# Patient Record
Sex: FEMALE | Race: WHITE | NOT HISPANIC OR LATINO | Employment: OTHER | URBAN - METROPOLITAN AREA
[De-identification: names, ages, dates, MRNs, and addresses within clinical notes are randomized per-mention and may not be internally consistent; named-entity substitution may affect disease eponyms.]

---

## 2017-04-07 ENCOUNTER — RECORDS - HEALTHEAST (OUTPATIENT)
Dept: LAB | Facility: CLINIC | Age: 67
End: 2017-04-07

## 2017-04-07 LAB
CHOLEST SERPL-MCNC: 172 MG/DL
FASTING STATUS PATIENT QL REPORTED: ABNORMAL
HDLC SERPL-MCNC: 41 MG/DL
LDLC SERPL CALC-MCNC: 99 MG/DL
TRIGL SERPL-MCNC: 160 MG/DL

## 2017-04-10 LAB — HCV AB SERPL QL IA: NEGATIVE

## 2017-12-08 ENCOUNTER — RECORDS - HEALTHEAST (OUTPATIENT)
Dept: LAB | Facility: CLINIC | Age: 67
End: 2017-12-08

## 2017-12-08 LAB
CHOLEST SERPL-MCNC: 116 MG/DL
FASTING STATUS PATIENT QL REPORTED: NO
HDLC SERPL-MCNC: 35 MG/DL
LDLC SERPL CALC-MCNC: 38 MG/DL
TRIGL SERPL-MCNC: 214 MG/DL

## 2018-02-22 ENCOUNTER — RECORDS - HEALTHEAST (OUTPATIENT)
Dept: LAB | Facility: CLINIC | Age: 68
End: 2018-02-22

## 2018-02-22 ENCOUNTER — RECORDS - HEALTHEAST (OUTPATIENT)
Dept: ADMINISTRATIVE | Facility: OTHER | Age: 68
End: 2018-02-22

## 2018-02-23 LAB — BACTERIA SPEC CULT: NO GROWTH

## 2018-02-26 ENCOUNTER — RECORDS - HEALTHEAST (OUTPATIENT)
Dept: ADMINISTRATIVE | Facility: OTHER | Age: 68
End: 2018-02-26

## 2018-03-01 ENCOUNTER — RECORDS - HEALTHEAST (OUTPATIENT)
Dept: ADMINISTRATIVE | Facility: OTHER | Age: 68
End: 2018-03-01

## 2018-03-01 ENCOUNTER — AMBULATORY - HEALTHEAST (OUTPATIENT)
Dept: SURGERY | Facility: CLINIC | Age: 68
End: 2018-03-01

## 2018-03-01 DIAGNOSIS — C50.911 MALIGNANT NEOPLASM OF RIGHT BREAST (H): ICD-10-CM

## 2018-03-20 ENCOUNTER — OFFICE VISIT - HEALTHEAST (OUTPATIENT)
Dept: SURGERY | Facility: CLINIC | Age: 68
End: 2018-03-20

## 2018-03-20 DIAGNOSIS — Z17.0 MALIGNANT NEOPLASM OF UPPER-OUTER QUADRANT OF RIGHT BREAST IN FEMALE, ESTROGEN RECEPTOR POSITIVE (H): ICD-10-CM

## 2018-03-20 DIAGNOSIS — C50.411 MALIGNANT NEOPLASM OF UPPER-OUTER QUADRANT OF RIGHT BREAST IN FEMALE, ESTROGEN RECEPTOR POSITIVE (H): ICD-10-CM

## 2018-03-20 RX ORDER — METOPROLOL TARTRATE 25 MG/1
25 TABLET, FILM COATED ORAL DAILY
Status: SHIPPED | COMMUNITY
Start: 2018-03-20

## 2018-03-20 RX ORDER — AMITRIPTYLINE HYDROCHLORIDE 10 MG/1
TABLET ORAL DAILY
Status: SHIPPED | COMMUNITY
Start: 2018-03-20 | End: 2022-08-15

## 2018-03-20 RX ORDER — DORZOLAMIDE HYDROCHLORIDE AND TIMOLOL MALEATE 20; 5 MG/ML; MG/ML
SOLUTION/ DROPS OPHTHALMIC
Status: SHIPPED | COMMUNITY
Start: 2018-03-20 | End: 2024-01-19

## 2018-03-20 RX ORDER — ATORVASTATIN CALCIUM 10 MG/1
10 TABLET, FILM COATED ORAL DAILY
Status: SHIPPED | COMMUNITY
Start: 2017-05-22

## 2018-03-20 RX ORDER — ASPIRIN 325 MG
325 TABLET ORAL DAILY
Status: SHIPPED | COMMUNITY
Start: 2018-03-20

## 2018-03-20 RX ORDER — TRIAMTERENE AND HYDROCHLOROTHIAZIDE 37.5; 25 MG/1; MG/1
CAPSULE ORAL DAILY
Status: SHIPPED | COMMUNITY
Start: 2016-10-14 | End: 2022-11-18 | Stop reason: ALTCHOICE

## 2018-03-20 RX ORDER — UBIDECARENONE 200 MG
200 CAPSULE ORAL DAILY
Status: SHIPPED | COMMUNITY
Start: 2018-03-20

## 2018-03-20 RX ORDER — LATANOPROST 50 UG/ML
1 SOLUTION/ DROPS OPHTHALMIC DAILY
Status: SHIPPED | COMMUNITY
Start: 2018-01-24 | End: 2023-08-29

## 2018-03-20 ASSESSMENT — MIFFLIN-ST. JEOR: SCORE: 1211.45

## 2018-04-11 ENCOUNTER — RECORDS - HEALTHEAST (OUTPATIENT)
Dept: LAB | Facility: CLINIC | Age: 68
End: 2018-04-11

## 2018-04-11 LAB
ANION GAP SERPL CALCULATED.3IONS-SCNC: 13 MMOL/L (ref 5–18)
BUN SERPL-MCNC: 12 MG/DL (ref 8–28)
CALCIUM SERPL-MCNC: 9.9 MG/DL (ref 8.5–10.5)
CHLORIDE BLD-SCNC: 93 MMOL/L (ref 98–107)
CO2 SERPL-SCNC: 31 MMOL/L (ref 22–31)
CREAT SERPL-MCNC: 0.91 MG/DL (ref 0.6–1.1)
GFR SERPL CREATININE-BSD FRML MDRD: >60 ML/MIN/1.73M2
GLUCOSE BLD-MCNC: 301 MG/DL (ref 70–125)
POTASSIUM BLD-SCNC: 3.1 MMOL/L (ref 3.5–5)
SODIUM SERPL-SCNC: 137 MMOL/L (ref 136–145)

## 2018-04-16 ENCOUNTER — RECORDS - HEALTHEAST (OUTPATIENT)
Dept: LAB | Facility: CLINIC | Age: 68
End: 2018-04-16

## 2018-04-16 LAB — POTASSIUM BLD-SCNC: 3.6 MMOL/L (ref 3.5–5)

## 2018-04-16 ASSESSMENT — MIFFLIN-ST. JEOR: SCORE: 1210.09

## 2018-04-17 ENCOUNTER — ANESTHESIA - HEALTHEAST (OUTPATIENT)
Dept: SURGERY | Facility: AMBULATORY SURGERY CENTER | Age: 68
End: 2018-04-17

## 2018-04-18 ENCOUNTER — SURGERY - HEALTHEAST (OUTPATIENT)
Dept: SURGERY | Facility: AMBULATORY SURGERY CENTER | Age: 68
End: 2018-04-18

## 2018-04-18 ENCOUNTER — HOSPITAL ENCOUNTER (OUTPATIENT)
Dept: MAMMOGRAPHY | Facility: CLINIC | Age: 68
Discharge: HOME | End: 2018-04-18
Attending: SPECIALIST | Admitting: RADIOLOGY

## 2018-04-18 ENCOUNTER — HOSPITAL ENCOUNTER (OUTPATIENT)
Dept: NUCLEAR MEDICINE | Facility: HOSPITAL | Age: 68
Discharge: HOME | End: 2018-04-18
Attending: SPECIALIST

## 2018-04-18 ENCOUNTER — HOSPITAL ENCOUNTER (OUTPATIENT)
Dept: MAMMOGRAPHY | Facility: CLINIC | Age: 68
Discharge: HOME | End: 2018-04-18
Attending: SPECIALIST

## 2018-04-18 DIAGNOSIS — Z17.0 MALIGNANT NEOPLASM OF UPPER-OUTER QUADRANT OF RIGHT BREAST IN FEMALE, ESTROGEN RECEPTOR POSITIVE (H): ICD-10-CM

## 2018-04-18 DIAGNOSIS — C50.411 MALIGNANT NEOPLASM OF UPPER-OUTER QUADRANT OF RIGHT BREAST IN FEMALE, ESTROGEN RECEPTOR POSITIVE (H): ICD-10-CM

## 2018-04-18 ASSESSMENT — MIFFLIN-ST. JEOR: SCORE: 1210.09

## 2018-04-24 ENCOUNTER — HOSPITAL ENCOUNTER (OUTPATIENT)
Dept: SURGERY | Facility: CLINIC | Age: 68
Discharge: HOME | End: 2018-04-24
Attending: SPECIALIST

## 2018-04-24 DIAGNOSIS — Z48.89 POSTOPERATIVE VISIT: ICD-10-CM

## 2018-04-24 DIAGNOSIS — C50.411 MALIGNANT NEOPLASM OF UPPER-OUTER QUADRANT OF RIGHT BREAST IN FEMALE, ESTROGEN RECEPTOR POSITIVE (H): ICD-10-CM

## 2018-04-24 DIAGNOSIS — Z17.0 MALIGNANT NEOPLASM OF UPPER-OUTER QUADRANT OF RIGHT BREAST IN FEMALE, ESTROGEN RECEPTOR POSITIVE (H): ICD-10-CM

## 2018-08-13 ENCOUNTER — RECORDS - HEALTHEAST (OUTPATIENT)
Dept: LAB | Facility: CLINIC | Age: 68
End: 2018-08-13

## 2018-08-13 LAB — MAGNESIUM SERPL-MCNC: 1.8 MG/DL (ref 1.8–2.6)

## 2018-11-13 ENCOUNTER — RECORDS - HEALTHEAST (OUTPATIENT)
Dept: LAB | Facility: CLINIC | Age: 68
End: 2018-11-13

## 2018-11-13 LAB
ALBUMIN SERPL-MCNC: 3.9 G/DL (ref 3.5–5)
ALP SERPL-CCNC: 78 U/L (ref 45–120)
ALT SERPL W P-5'-P-CCNC: 22 U/L (ref 0–45)
ANION GAP SERPL CALCULATED.3IONS-SCNC: 13 MMOL/L (ref 5–18)
AST SERPL W P-5'-P-CCNC: 23 U/L (ref 0–40)
BILIRUB SERPL-MCNC: 0.7 MG/DL (ref 0–1)
BUN SERPL-MCNC: 17 MG/DL (ref 8–28)
CALCIUM SERPL-MCNC: 10.8 MG/DL (ref 8.5–10.5)
CHLORIDE BLD-SCNC: 92 MMOL/L (ref 98–107)
CHOLEST SERPL-MCNC: 108 MG/DL
CO2 SERPL-SCNC: 34 MMOL/L (ref 22–31)
CREAT SERPL-MCNC: 0.89 MG/DL (ref 0.6–1.1)
FASTING STATUS PATIENT QL REPORTED: ABNORMAL
GFR SERPL CREATININE-BSD FRML MDRD: >60 ML/MIN/1.73M2
GLUCOSE BLD-MCNC: 152 MG/DL (ref 70–125)
HDLC SERPL-MCNC: 35 MG/DL
LDLC SERPL CALC-MCNC: 45 MG/DL
POTASSIUM BLD-SCNC: 3.6 MMOL/L (ref 3.5–5)
PROT SERPL-MCNC: 7.5 G/DL (ref 6–8)
SODIUM SERPL-SCNC: 139 MMOL/L (ref 136–145)
TRIGL SERPL-MCNC: 142 MG/DL

## 2019-03-05 ENCOUNTER — RECORDS - HEALTHEAST (OUTPATIENT)
Dept: LAB | Facility: CLINIC | Age: 69
End: 2019-03-05

## 2019-03-05 LAB
ALBUMIN SERPL-MCNC: 3.4 G/DL (ref 3.5–5)
ALP SERPL-CCNC: 75 U/L (ref 45–120)
ALT SERPL W P-5'-P-CCNC: 25 U/L (ref 0–45)
ANION GAP SERPL CALCULATED.3IONS-SCNC: 12 MMOL/L (ref 5–18)
AST SERPL W P-5'-P-CCNC: 22 U/L (ref 0–40)
BILIRUB SERPL-MCNC: 0.5 MG/DL (ref 0–1)
BUN SERPL-MCNC: 16 MG/DL (ref 8–28)
CALCIUM SERPL-MCNC: 10.2 MG/DL (ref 8.5–10.5)
CHLORIDE BLD-SCNC: 95 MMOL/L (ref 98–107)
CO2 SERPL-SCNC: 32 MMOL/L (ref 22–31)
CREAT SERPL-MCNC: 0.9 MG/DL (ref 0.6–1.1)
GFR SERPL CREATININE-BSD FRML MDRD: >60 ML/MIN/1.73M2
GLUCOSE BLD-MCNC: 186 MG/DL (ref 70–125)
POTASSIUM BLD-SCNC: 3.4 MMOL/L (ref 3.5–5)
PROT SERPL-MCNC: 6.9 G/DL (ref 6–8)
SODIUM SERPL-SCNC: 139 MMOL/L (ref 136–145)

## 2019-03-07 LAB — ANA SER QL: 0.6 U

## 2019-06-04 ENCOUNTER — RECORDS - HEALTHEAST (OUTPATIENT)
Dept: LAB | Facility: CLINIC | Age: 69
End: 2019-06-04

## 2019-06-04 LAB
ANION GAP SERPL CALCULATED.3IONS-SCNC: 11 MMOL/L (ref 5–18)
BUN SERPL-MCNC: 15 MG/DL (ref 8–28)
CALCIUM SERPL-MCNC: 10.6 MG/DL (ref 8.5–10.5)
CHLORIDE BLD-SCNC: 94 MMOL/L (ref 98–107)
CO2 SERPL-SCNC: 33 MMOL/L (ref 22–31)
CREAT SERPL-MCNC: 0.94 MG/DL (ref 0.6–1.1)
GFR SERPL CREATININE-BSD FRML MDRD: 59 ML/MIN/1.73M2
GLUCOSE BLD-MCNC: 147 MG/DL (ref 70–125)
POTASSIUM BLD-SCNC: 3.3 MMOL/L (ref 3.5–5)
SODIUM SERPL-SCNC: 138 MMOL/L (ref 136–145)

## 2019-09-10 ENCOUNTER — RECORDS - HEALTHEAST (OUTPATIENT)
Dept: LAB | Facility: CLINIC | Age: 69
End: 2019-09-10

## 2019-09-10 LAB
ANION GAP SERPL CALCULATED.3IONS-SCNC: 14 MMOL/L (ref 5–18)
BUN SERPL-MCNC: 15 MG/DL (ref 8–28)
CALCIUM SERPL-MCNC: 10.6 MG/DL (ref 8.5–10.5)
CHLORIDE BLD-SCNC: 94 MMOL/L (ref 98–107)
CO2 SERPL-SCNC: 30 MMOL/L (ref 22–31)
CREAT SERPL-MCNC: 0.93 MG/DL (ref 0.6–1.1)
CREAT UR-MCNC: 116 MG/DL
GFR SERPL CREATININE-BSD FRML MDRD: 59 ML/MIN/1.73M2
GLUCOSE BLD-MCNC: 143 MG/DL (ref 70–125)
MICROALBUMIN UR-MCNC: 1.3 MG/DL (ref 0–1.99)
MICROALBUMIN/CREAT UR: 11.2 MG/G
POTASSIUM BLD-SCNC: 3.6 MMOL/L (ref 3.5–5)
SODIUM SERPL-SCNC: 138 MMOL/L (ref 136–145)

## 2019-09-11 LAB — 25(OH)D3 SERPL-MCNC: 30.1 NG/ML (ref 30–80)

## 2020-01-07 ENCOUNTER — RECORDS - HEALTHEAST (OUTPATIENT)
Dept: LAB | Facility: CLINIC | Age: 70
End: 2020-01-07

## 2020-01-07 LAB
ANION GAP SERPL CALCULATED.3IONS-SCNC: 11 MMOL/L (ref 5–18)
BUN SERPL-MCNC: 15 MG/DL (ref 8–28)
CALCIUM SERPL-MCNC: 10.2 MG/DL (ref 8.5–10.5)
CHLORIDE BLD-SCNC: 90 MMOL/L (ref 98–107)
CHOLEST SERPL-MCNC: 110 MG/DL
CO2 SERPL-SCNC: 32 MMOL/L (ref 22–31)
CREAT SERPL-MCNC: 0.98 MG/DL (ref 0.6–1.1)
FASTING STATUS PATIENT QL REPORTED: ABNORMAL
GFR SERPL CREATININE-BSD FRML MDRD: 56 ML/MIN/1.73M2
GLUCOSE BLD-MCNC: 307 MG/DL (ref 70–125)
HDLC SERPL-MCNC: 41 MG/DL
LDLC SERPL CALC-MCNC: 43 MG/DL
POTASSIUM BLD-SCNC: 3.2 MMOL/L (ref 3.5–5)
SODIUM SERPL-SCNC: 133 MMOL/L (ref 136–145)
TRIGL SERPL-MCNC: 128 MG/DL

## 2020-02-20 ENCOUNTER — RECORDS - HEALTHEAST (OUTPATIENT)
Dept: LAB | Facility: CLINIC | Age: 70
End: 2020-02-20

## 2020-02-20 LAB — POTASSIUM BLD-SCNC: 3.8 MMOL/L (ref 3.5–5)

## 2020-05-18 ENCOUNTER — RECORDS - HEALTHEAST (OUTPATIENT)
Dept: LAB | Facility: CLINIC | Age: 70
End: 2020-05-18

## 2020-05-18 LAB
ANION GAP SERPL CALCULATED.3IONS-SCNC: 16 MMOL/L (ref 5–18)
BUN SERPL-MCNC: 13 MG/DL (ref 8–28)
CALCIUM SERPL-MCNC: 10.1 MG/DL (ref 8.5–10.5)
CHLORIDE BLD-SCNC: 92 MMOL/L (ref 98–107)
CO2 SERPL-SCNC: 29 MMOL/L (ref 22–31)
CREAT SERPL-MCNC: 1.1 MG/DL (ref 0.6–1.1)
GFR SERPL CREATININE-BSD FRML MDRD: 49 ML/MIN/1.73M2
GLUCOSE BLD-MCNC: 312 MG/DL (ref 70–125)
POTASSIUM BLD-SCNC: 3 MMOL/L (ref 3.5–5)
SODIUM SERPL-SCNC: 137 MMOL/L (ref 136–145)

## 2020-07-08 ENCOUNTER — RECORDS - HEALTHEAST (OUTPATIENT)
Dept: LAB | Facility: CLINIC | Age: 70
End: 2020-07-08

## 2020-07-08 LAB
ANION GAP SERPL CALCULATED.3IONS-SCNC: 13 MMOL/L (ref 5–18)
BUN SERPL-MCNC: 11 MG/DL (ref 8–28)
CALCIUM SERPL-MCNC: 10 MG/DL (ref 8.5–10.5)
CHLORIDE BLD-SCNC: 94 MMOL/L (ref 98–107)
CO2 SERPL-SCNC: 30 MMOL/L (ref 22–31)
CREAT SERPL-MCNC: 0.92 MG/DL (ref 0.6–1.1)
GFR SERPL CREATININE-BSD FRML MDRD: 60 ML/MIN/1.73M2
GLUCOSE BLD-MCNC: 141 MG/DL (ref 70–125)
POTASSIUM BLD-SCNC: 3.3 MMOL/L (ref 3.5–5)
SODIUM SERPL-SCNC: 137 MMOL/L (ref 136–145)

## 2020-09-21 ENCOUNTER — RECORDS - HEALTHEAST (OUTPATIENT)
Dept: LAB | Facility: CLINIC | Age: 70
End: 2020-09-21

## 2020-09-24 LAB
ANION GAP SERPL CALCULATED.3IONS-SCNC: 13 MMOL/L (ref 5–18)
BUN SERPL-MCNC: 15 MG/DL (ref 8–28)
CALCIUM SERPL-MCNC: 10 MG/DL (ref 8.5–10.5)
CHLORIDE BLD-SCNC: 96 MMOL/L (ref 98–107)
CO2 SERPL-SCNC: 27 MMOL/L (ref 22–31)
CREAT SERPL-MCNC: 0.99 MG/DL (ref 0.6–1.1)
GFR SERPL CREATININE-BSD FRML MDRD: 55 ML/MIN/1.73M2
GLUCOSE BLD-MCNC: 270 MG/DL (ref 70–125)
POTASSIUM BLD-SCNC: 3.8 MMOL/L (ref 3.5–5)
SODIUM SERPL-SCNC: 136 MMOL/L (ref 136–145)

## 2021-01-26 ENCOUNTER — RECORDS - HEALTHEAST (OUTPATIENT)
Dept: LAB | Facility: CLINIC | Age: 71
End: 2021-01-26

## 2021-01-26 LAB
ANION GAP SERPL CALCULATED.3IONS-SCNC: 12 MMOL/L (ref 5–18)
BUN SERPL-MCNC: 15 MG/DL (ref 8–28)
CALCIUM SERPL-MCNC: 10.3 MG/DL (ref 8.5–10.5)
CHLORIDE BLD-SCNC: 95 MMOL/L (ref 98–107)
CHOLEST SERPL-MCNC: 122 MG/DL
CO2 SERPL-SCNC: 30 MMOL/L (ref 22–31)
CREAT SERPL-MCNC: 1.12 MG/DL (ref 0.6–1.1)
FASTING STATUS PATIENT QL REPORTED: ABNORMAL
GFR SERPL CREATININE-BSD FRML MDRD: 48 ML/MIN/1.73M2
GLUCOSE BLD-MCNC: 257 MG/DL (ref 70–125)
HDLC SERPL-MCNC: 40 MG/DL
LDLC SERPL CALC-MCNC: 34 MG/DL
POTASSIUM BLD-SCNC: 4 MMOL/L (ref 3.5–5)
SODIUM SERPL-SCNC: 137 MMOL/L (ref 136–145)
TRIGL SERPL-MCNC: 238 MG/DL
TSH SERPL DL<=0.005 MIU/L-ACNC: 2.13 UIU/ML (ref 0.3–5)

## 2021-03-25 ENCOUNTER — AMBULATORY - HEALTHEAST (OUTPATIENT)
Dept: CARDIOLOGY | Facility: CLINIC | Age: 71
End: 2021-03-25

## 2021-03-25 ENCOUNTER — RECORDS - HEALTHEAST (OUTPATIENT)
Dept: ADMINISTRATIVE | Facility: OTHER | Age: 71
End: 2021-03-25

## 2021-04-01 ENCOUNTER — OFFICE VISIT - HEALTHEAST (OUTPATIENT)
Dept: CARDIOLOGY | Facility: CLINIC | Age: 71
End: 2021-04-01

## 2021-04-01 DIAGNOSIS — I51.7 LVH (LEFT VENTRICULAR HYPERTROPHY): ICD-10-CM

## 2021-04-01 RX ORDER — LETROZOLE 2.5 MG/1
1 TABLET, FILM COATED ORAL DAILY
Status: SHIPPED | COMMUNITY
Start: 2021-03-30 | End: 2023-02-01

## 2021-04-01 ASSESSMENT — MIFFLIN-ST. JEOR: SCORE: 1146.59

## 2021-05-11 ENCOUNTER — RECORDS - HEALTHEAST (OUTPATIENT)
Dept: LAB | Facility: CLINIC | Age: 71
End: 2021-05-11

## 2021-05-11 LAB
ANION GAP SERPL CALCULATED.3IONS-SCNC: 15 MMOL/L (ref 5–18)
BUN SERPL-MCNC: 13 MG/DL (ref 8–28)
CALCIUM SERPL-MCNC: 10.3 MG/DL (ref 8.5–10.5)
CHLORIDE BLD-SCNC: 93 MMOL/L (ref 98–107)
CO2 SERPL-SCNC: 31 MMOL/L (ref 22–31)
CREAT SERPL-MCNC: 0.97 MG/DL (ref 0.6–1.1)
CREAT UR-MCNC: 166.5 MG/DL
GFR SERPL CREATININE-BSD FRML MDRD: 56 ML/MIN/1.73M2
GLUCOSE BLD-MCNC: 182 MG/DL (ref 70–125)
MICROALBUMIN UR-MCNC: 4.01 MG/DL (ref 0–1.99)
MICROALBUMIN/CREAT UR: 24.1 MG/G
POTASSIUM BLD-SCNC: 3.8 MMOL/L (ref 3.5–5)
SODIUM SERPL-SCNC: 139 MMOL/L (ref 136–145)

## 2021-06-01 VITALS — BODY MASS INDEX: 29.11 KG/M2 | WEIGHT: 164.3 LBS | HEIGHT: 63 IN

## 2021-06-01 VITALS — BODY MASS INDEX: 29.06 KG/M2 | WEIGHT: 164 LBS | HEIGHT: 63 IN

## 2021-06-05 VITALS
BODY MASS INDEX: 26.58 KG/M2 | HEART RATE: 110 BPM | OXYGEN SATURATION: 93 % | SYSTOLIC BLOOD PRESSURE: 126 MMHG | HEIGHT: 63 IN | RESPIRATION RATE: 14 BRPM | DIASTOLIC BLOOD PRESSURE: 82 MMHG | WEIGHT: 150 LBS

## 2021-06-16 PROBLEM — C50.411 MALIGNANT NEOPLASM OF UPPER-OUTER QUADRANT OF RIGHT BREAST IN FEMALE, ESTROGEN RECEPTOR POSITIVE (H): Status: ACTIVE | Noted: 2018-04-05

## 2021-06-16 PROBLEM — Z17.0 MALIGNANT NEOPLASM OF UPPER-OUTER QUADRANT OF RIGHT BREAST IN FEMALE, ESTROGEN RECEPTOR POSITIVE (H): Status: ACTIVE | Noted: 2018-04-05

## 2021-06-16 NOTE — PROGRESS NOTES
This is a 68 y.o.  female who I'm asked to see by Meg Kahn MD for evaluation of a right breast cancer.  This was picked up  on screening mammogram.  The patient cannot feel a mass.  A needle biopsy was done which shows an invasive ductal carcinoma.  It is estrogen receptor positive, progesterone receptor positive and HER-2 negative.    She has a moderate family history of breast cancer. Her mother but no one else.      PAST MEDICAL HISTORY:  No past medical history on file.  No past surgical history on file.    Medications:    Current Outpatient Prescriptions:      amitriptyline (ELAVIL) 10 MG tablet, daily., Disp: , Rfl:      aspirin 325 MG tablet, daily., Disp: , Rfl:      atorvastatin (LIPITOR) 10 MG tablet, daily., Disp: , Rfl:      cholecalciferol, vitamin D3, 1,000 unit tablet, Take 2,000 Units by mouth daily., Disp: , Rfl:      coenzyme Q10 200 mg capsule, Take 200 mg by mouth daily., Disp: , Rfl:      dorzolamide-timolol (COSOPT) 22.3-6.8 mg/mL ophthalmic solution, 1 gtt, Disp: , Rfl:      fluticasone (FLONASE) 50 mcg/actuation nasal spray, as needed., Disp: , Rfl:      LACTOBACILLUS COMBO NO.6 (PROBIOTIC COMPLEX ORAL), 1, Disp: , Rfl:      latanoprost (XALATAN) 0.005 % ophthalmic solution, , Disp: , Rfl:      metFORMIN (GLUCOPHAGE) 500 MG tablet, 2 (two) times a day., Disp: , Rfl:      metoprolol tartrate (LOPRESSOR) 25 MG tablet, 2 (two) times a day., Disp: , Rfl:      omeprazole (PRILOSEC) 20 MG capsule, 2 tabs, Disp: , Rfl:      SIMETHICONE ORAL, Take by mouth as needed., Disp: , Rfl:      triamterene-hydrochlorothiazide (DYAZIDE) 37.5-25 mg per capsule, Take by mouth daily., Disp: , Rfl:     Allergies:  Allergies   Allergen Reactions     Sulfa (Sulfonamide Antibiotics) Unknown       Social History:   reports that she has never smoked. She has never used smokeless tobacco. She reports that she drinks alcohol. She reports that she does not use illicit drugs.    ROS:  A 12 point comprehensive review  "of systems was negative except as noted.    Physical Exam  /88 (Patient Site: Right Arm, Patient Position: Sitting, Cuff Size: Adult Regular)  Pulse 100  Ht 5' 2.5\" (1.588 m)  Wt 164 lb 4.8 oz (74.5 kg)  SpO2 96%  Breastfeeding? No  BMI 29.57 kg/m2  General:alert, appears stated age and cooperative  Lungs:clear to auscultation bilaterally  CV:regular rate and rhythm, S1, S2 normal, no murmur, click, rub or gallop  Breasts: No palpable masses.  No skin changes.  Lymph Nodes:no axillary nodes palpated  Neuro:Grossly normal      Reviewed her mammograms and ultrasound and pathology.     Impression: Right Breast Cancer. Clinically T1, N0.  Discussed the surgical options for treatment of breast cancer which generally are a lumpectomy (partial mastectomy) combined with radiation versus a mastectomy.  Explained that the survival benefit is the same for both.  The difference is in local recurrence risk.  The patient is a Excellent candidate for a lumpectomy.  This is a relatively small lesion and she has a very generous sized breast so she should build to have a lumpectomy with almost no change in her breasts.  Discussed SLN biopsy.  The procedure and rationale were explained.  Discussed that at this point we do not know yet whether or not she will need chemotherapy and we may not know until we get all of the results of surgery back.  Than what I see so far however I doubt that she will need therapy.  She will undoubtedly need hormone therapy.      Plan: We'll schedule for a right  lumpectomy with wire localization with sentinel lymph node biopsy.  This is typically an outpatient procedure under local MAC anesthetic.  The risks and benefits of surgery were explained.  Also talked about expected recovery time.          "

## 2021-06-17 NOTE — PROGRESS NOTES
In for follow-up of her right lumpectomy  with sentinel lymph node biopsy.  She is feeling well.  She is having very minimal pain.      Physical exam:  Appears well.  Does not appear in any discomfort.  Breasts: Incisions are healing nicely with no signs of infection.  No swelling.    Pathology: The tumor was 1.0 cm.  The margins are negative.  Her sentinel lymph node is negative.    Impression: Postop visit. Doing well. Has a very good prgnosis.  Because of her age, the fact the tumor is so small and low-grade, I do not think an Oncotype is needed.  I think she does need hormone therapy and I think she should at least have a discussion with the radiation oncologist about radiation.    Plan:  We'll refer her onto [place] [facility].  Follow-up with me in 1 year with bilateral mammograms.

## 2021-06-17 NOTE — ANESTHESIA CARE TRANSFER NOTE
Last vitals:   Vitals:    04/18/18 1240   BP: 128/60   Pulse: 82   Resp: 14   Temp: 37.5  C (99.5  F)   SpO2: 99%     Patient's level of consciousness is drowsy  Spontaneous respirations: yes  Maintains airway independently: yes  Dentition unchanged: yes  Oropharynx: oropharynx clear of all foreign objects    QCDR Measures:  ASA# 20 - Surgical Safety Checklist: WHO surgical safety checklist completed prior to induction  PQRS# 430 - Adult PONV Prevention: 4558F - Pt received => 2 anti-emetic agents (different classes) preop & intraop  ASA# 8 - Peds PONV Prevention: NA - Not pediatric patient, not GA or 2 or more risk factors NOT present  PQRS# 424 - Tammy-op Temp Management: 4559F - At least one body temp DOCUMENTED => 35.5C or 95.9F within required timeframe  PQRS# 426 - PACU Transfer Protocol: - Transfer of care checklist used  ASA# 14 - Acute Post-op Pain: ASA14B - Patient did NOT experience pain >= 7 out of 10

## 2021-06-17 NOTE — ANESTHESIA POSTPROCEDURE EVALUATION
Patient: Patty Soliman  Right Lumpectomy after Wire Localization; [place] Lymph Node Biopsy  Anesthesia type: MAC    Patient location: Phase II Recovery  Last vitals:   Vitals:    04/18/18 1300   BP: 123/58   Pulse: 84   Resp: 16   Temp:    SpO2: 94%     Post vital signs: stable  Level of consciousness: awake and responds to simple questions  Post-anesthesia pain: pain controlled  Post-anesthesia nausea and vomiting: no  Pulmonary: unassisted, return to baseline  Cardiovascular: stable and blood pressure at baseline  Hydration: adequate  Anesthetic events: no    QCDR Measures:  ASA# 11 - Tammy-op Cardiac Arrest: ASA11B - Patient did NOT experience unanticipated cardiac arrest  ASA# 12 - Tammy-op Mortality Rate: ASA12B - Patient did NOT die  ASA# 13 - PACU Re-Intubation Rate: NA - No ETT / LMA used for case  ASA# 10 - Composite Anes Safety: ASA10A - No serious adverse event    Additional Notes:

## 2021-06-17 NOTE — ANESTHESIA PREPROCEDURE EVALUATION
Anesthesia Evaluation      Patient summary reviewed     Airway   Mallampati: III  Neck ROM: full   Pulmonary - negative ROS    breath sounds clear to auscultation                         Cardiovascular   Exercise tolerance: > or = 4 METS  (+) hypertension, ,     Rhythm: regular        Neuro/Psych      Endo/Other    (+) diabetes mellitus type 2,      GI/Hepatic/Renal    (+) GERD well controlled,        Other findings:   NPO 9 PM      Breast CA      Dental - normal exam                        Anesthesia Plan  Planned anesthetic: MAC    ASA 3   Induction: intravenous   Anesthetic plan and risks discussed with: patient and spouse  Anesthesia plan special considerations: antiemetics,   Post-op plan: routine recovery        Aracely Alexander MD  Staff Anesthesiologist  [facility]  4/18/18

## 2021-06-18 NOTE — PATIENT INSTRUCTIONS - HE
Patient Instructions by Annabella Saeed MD at 4/1/2021  3:30 PM     Author: Annabella Saeed MD Service: -- Author Type: Physician    Filed: 4/1/2021  4:08 PM Encounter Date: 4/1/2021 Status: Signed    : Annabella Saeed MD (Physician)       Ms. Patty Soliman,     It was a pleasure to see you in the office today. My recommendations for you include:   1. Echo in one year  2. Follow up in one year     Please do not hesitate to call the [facility] with any questions or concerns at [phone].    Sincerely,

## 2021-06-30 NOTE — PROGRESS NOTES
Progress Notes by Annabella Saeed MD at 4/1/2021  3:30 PM     Author: Annabella Saeed MD Service: -- Author Type: Physician    Filed: 4/1/2021  4:29 PM Encounter Date: 4/1/2021 Status: Signed    : Annabella Saeed MD (Physician)           Thank you, Dr. Kahn, for asking us to see Patty Soliman at the [facility].      Assessment/Recommendations   Assessment:    1.  Mild asymmetric hypertrophy: No gradient at rest.  We discussed stress testing.  She had the same symptoms back in 2017 with nothing noted on her echo stress test at that time.  She would not be able to do an exercise stress echocardiogram due to inability to do the treadmill.  We would have to proceed with a dobutamine stress echo.  She would like to hold off on that at this point.  She does feel better after replacing her bed.  2.  Bronchiectasis/mild pulmonary fibrosis  3.  Diabetes mellitus type 2  4.  Dyslipidemia    Plan:  1.  Recommend considering following up with pulmonologist for further work-up given findings on her CT coronary angiogram showing bronchiectasis and possible mild pulmonary arterial dilatation.  She does feel better at this point would like to hold off on this.  2.  Repeat echocardiogram in 1 year.  3.  Follow-up in 1 year       History of Present Illness    Ms. Patty Soliman is a 71 y.o. female with history of poorly controlled diabetes mellitus, hypertension, dyslipidemia, chronic kidney disease who I am seeing today for initial consultation due to shortness of breath.  She recently had an echocardiogram done on 2/1/2021 that showed a left ventricular ejection fraction of 60 to 65%, focal mild asymmetric septal hypertrophy, mild diastolic dysfunction and aortic sclerosis.  She reports that she has had shortness of breath for about 4 years now.  She describes shortness of breath with exertion as well as congestion.  She feels like she has mucus in the back of her throat.  She  feels that she may be allergic to something and has had been tried on medications in the past.  She saw Dr. Das for the same thing back in 2017.  Since stress echocardiogram was equivocal.  She had trouble exercising on a treadmill.  She underwent a CT coronary angiogram that showed a calcium score of 88 and mild nonobstructive disease with mild PA dilatation and bronchiectasis indicating mild pulmonary fibrosis.  She has never been seen by pulmonologist.  She denies any chest pain.  She reports that she noticed that her bed was discolored and black in certain places and so she recently got a new bed a few days ago and has noticed improvement in her breathing since that time.    CT coronary angiogram with calcium score 17  Result Impression   1. Total Agatston calcium score 88; 73rd percentile compared to age and   gender match control group.  2. Right dominant coronary arterial system.  3. Mild atherosclerosis distal left main coronary artery and   ostial/proximal left anterior descending coronary artery (calcified and   noncalcified).  4. Small/deminutive mid to distal left anterior descending coronary   artery; limited opacification/visualization (see below).  5. Large, dominant,right coronary artery with large posterior descending   artery; distal vessel extends to the anterior apical segment left   ventricle.  6. Anterolateral mitral annular calcification with moderate, diffuse,   thickening valve leaflets (see below).  7. Small, tunneled apical ventricular septal defect.  8. Dilated main and branch main pulmonary arterial system.         Result Narrative   Exercise Stress ECHO Report    Name: Patty Soliman  : 1950       Age: 67 y.o.  MRN: [id number]      Ordering: Meg Kahn MD    PCP: Meg Kahn MD   Supervising MD: Darren Bustamante Reporting MD: Darren Bustamante    Reason for Test:atypical chest pain, BOYER    Stress Protocol:Joseph protocol      Observation:    Resting heart rate was 99 beats/min with  a resting blood pressure of   128/70 mm Hg.  The patient exercised for 6 minutes and 0 seconds, reaching   a maximum heart rate of 156  beats/minute. Peak systolic blood pressure   was 142/86 mm Hg. Effort was equivalent to 7.3 METS, with a RPP of 75281.   Patient achieved 100 percent of age predicted maximum heart rate.    Exercise was terminated due to profound shortness of breath and fatigue   without reproduction of chest pain.    Pre exercise echocardiographic imaging shows EF of 55% and post exercise   echocardiographic imaging show global dysfunction without a clear area of   ischemia.    Resting EKG: normal sinus rhythm with nonspecific ICVD.    Stress EKG demonstrated no ischemic or ST segment changes over baseline.     Screening doppler is negative for significant valvular disease.    Conclusion:  1. Reduced effort tolerance likely with significant deconditioning  2. Adequate stress for interpretation with patient achieving 100 percent   of predicted maximum HR.  3. No subjective chest pain, but significant shortness of breath without   hemodynamic or ECG evidence of inducible ischemia.  4. Echocardiographic evidence of global hypokinesis as peak exercise   without obvious regional ischemia with some concerns for multivessel   disease.            Physical Examination Review of Systems   Vitals:    04/01/21 1536   BP: 126/82   Pulse: (!) 110   Resp: 14   SpO2: 93%     Body mass index is 27 kg/m .  Wt Readings from Last 3 Encounters:   04/01/21 150 lb (68 kg)   04/18/18 164 lb (74.4 kg)   03/20/18 164 lb 4.8 oz (74.5 kg)       General Appearance:   alert, no apparent distress   HEENT:  no scleral icterus; the mucous membranes are pink and moist                                  Neck: No jvd   Chest: the spine is straight and the chest is symmetric   Lungs:   respirations unlabored; the lungs are clear to auscultation   Cardiovascular:   regular rhythm with normal first and second heart sounds and no murmurs  or gallops   Abdomen:  no organomegaly, masses, bruits, or tenderness; bowel sounds are present   Extremities: no cyanosis, clubbing, or edema   Skin: no xanthelasma    General: WNL  Eyes: WNL  Ears/Nose/Throat: WNL  Lungs: Cough, Shortness of Breath  Heart: Shortness of Breath with activity  Stomach: Heartburn  Bladder: WNL  Muscle/Joints: Muscle Pain  Skin: WNL  Nervous System: WNL  Mental Health: Anxiety     Blood: WNL     Medical History  Surgical History Family History Social History   Past Medical History:   Diagnosis Date   ? Cancer (H)    ? Diabetes mellitus (H)     Type 2 Diabetic   ? Hypertension     Past Surgical History:   Procedure Laterality Date   ? [place] MASTECTOMY,PARTIAL,  WITH AXILLARY LYMPHADENECTOMY Right 4/18/2018    Procedure: Right Lumpectomy after Wire Localization; [place] Lymph Node Biopsy;  Surgeon: Katie Sahu MD;  [facility]: [facility];  Service: General   ? TUBAL LIGATION      No family history of premature coronary artery disease Social History     Socioeconomic History   ? Marital status:      Spouse name: Not on file   ? Number of children: Not on file   ? Years of education: Not on file   ? Highest education level: Not on file   Occupational History   ? Not on file   Social Needs   ? Financial resource strain: Not on file   ? Food insecurity     Worry: Not on file     Inability: Not on file   ? Transportation needs     Medical: Not on file     Non-medical: Not on file   Tobacco Use   ? Smoking status: Never Smoker   ? Smokeless tobacco: Never Used   Substance and Sexual Activity   ? Alcohol use: Yes     Comment: rarely   ? Drug use: No   ? Sexual activity: Not on file   Lifestyle   ? Physical activity     Days per week: Not on file     Minutes per session: Not on file   ? Stress: Not on file   Relationships   ? Social connections     Talks on phone: Not on file     Gets together: Not on file     Attends Muslim service: Not on file     Active member of club or  organization: Not on file     Attends meetings of clubs or organizations: Not on file     Relationship status: Not on file   ? Intimate partner violence     Fear of current or ex partner: Not on file     Emotionally abused: Not on file     Physically abused: Not on file     Forced sexual activity: Not on file   Other Topics Concern   ? Not on file   Social History Narrative   ? Not on file          Medications  Allergies   Current Outpatient Medications   Medication Sig Dispense Refill   ? amitriptyline (ELAVIL) 10 MG tablet daily.     ? aspirin 325 MG tablet daily.     ? atorvastatin (LIPITOR) 10 MG tablet daily.     ? cholecalciferol, vitamin D3, 1,000 unit tablet Take 2,000 Units by mouth daily.     ? coenzyme Q10 200 mg capsule Take 200 mg by mouth daily.     ? dorzolamide-timolol (COSOPT) 22.3-6.8 mg/mL ophthalmic solution 1 gtt     ? fluticasone (FLONASE) 50 mcg/actuation nasal spray as needed.     ? HYDROcodone-acetaminophen 5-325 mg per tablet Take 1 tablet by mouth every 4 (four) hours as needed for pain. 25 tablet 0   ? LACTOBACILLUS COMBO NO.6 (PROBIOTIC COMPLEX ORAL) 1     ? latanoprost (XALATAN) 0.005 % ophthalmic solution Administer 1 drop to both eyes daily.      ? letrozole (FEMARA) 2.5 mg tablet Take 1 tablet by mouth daily.     ? metFORMIN (GLUCOPHAGE) 500 MG tablet Take 1,000 mg by mouth daily with breakfast.      ? metoprolol tartrate (LOPRESSOR) 25 MG tablet Take 25 mg by mouth daily.      ? omeprazole (PRILOSEC) 20 MG capsule Take 20 mg by mouth daily before breakfast.      ? potassium chloride SA (K-DUR,KLOR-CON) 20 MEQ tablet Take 20 mEq by mouth as needed.      ? SIMETHICONE ORAL Take by mouth as needed.     ? triamterene-hydrochlorothiazide (DYAZIDE) 37.5-25 mg per capsule Take by mouth daily.       No current facility-administered medications for this visit.       Allergies   Allergen Reactions   ? Escitalopram Oxalate      Other reaction(s): fatigue   ? Sulfa (Sulfonamide Antibiotics)  Unknown   ? Sulfamethoxazole-Trimethoprim      Other reaction(s): Unknown         Lab Results    Chemistry/lipid CBC Cardiac Enzymes/BNP/TSH/INR   Lab Results   Component Value Date    CHOL 122 01/26/2021    HDL 40 (L) 01/26/2021    LDLCALC 34 01/26/2021    TRIG 238 (H) 01/26/2021    CREATININE 1.12 (H) 01/26/2021    BUN 15 01/26/2021    K 4.0 01/26/2021     01/26/2021    CL 95 (L) 01/26/2021    CO2 30 01/26/2021    No results found for: WBC, HGB, HCT, MCV, PLT Lab Results   Component Value Date    TSH 2.13 01/26/2021

## 2021-08-21 ENCOUNTER — HEALTH MAINTENANCE LETTER (OUTPATIENT)
Age: 71
End: 2021-08-21

## 2021-09-07 ENCOUNTER — LAB REQUISITION (OUTPATIENT)
Dept: LAB | Facility: CLINIC | Age: 71
End: 2021-09-07
Payer: MEDICARE

## 2021-09-07 DIAGNOSIS — E87.6 HYPOKALEMIA: ICD-10-CM

## 2021-09-07 LAB
ANION GAP SERPL CALCULATED.3IONS-SCNC: 13 MMOL/L (ref 5–18)
BUN SERPL-MCNC: 13 MG/DL (ref 8–28)
CALCIUM SERPL-MCNC: 10.4 MG/DL (ref 8.5–10.5)
CHLORIDE BLD-SCNC: 96 MMOL/L (ref 98–107)
CO2 SERPL-SCNC: 29 MMOL/L (ref 22–31)
CREAT SERPL-MCNC: 0.93 MG/DL (ref 0.6–1.1)
GFR SERPL CREATININE-BSD FRML MDRD: 61 ML/MIN/1.73M2
GLUCOSE BLD-MCNC: 170 MG/DL (ref 70–125)
POTASSIUM BLD-SCNC: 3.5 MMOL/L (ref 3.5–5)
SODIUM SERPL-SCNC: 138 MMOL/L (ref 136–145)

## 2021-09-07 PROCEDURE — 80048 BASIC METABOLIC PNL TOTAL CA: CPT | Mod: ORL | Performed by: FAMILY MEDICINE

## 2021-10-16 ENCOUNTER — HEALTH MAINTENANCE LETTER (OUTPATIENT)
Age: 71
End: 2021-10-16

## 2022-03-29 ENCOUNTER — LAB REQUISITION (OUTPATIENT)
Dept: LAB | Facility: CLINIC | Age: 72
End: 2022-03-29
Payer: MEDICARE

## 2022-03-29 DIAGNOSIS — E78.2 MIXED HYPERLIPIDEMIA: ICD-10-CM

## 2022-03-29 LAB
CHOLEST SERPL-MCNC: 116 MG/DL
FASTING STATUS PATIENT QL REPORTED: ABNORMAL
HDLC SERPL-MCNC: 36 MG/DL
LDLC SERPL CALC-MCNC: 43 MG/DL
TRIGL SERPL-MCNC: 185 MG/DL

## 2022-03-29 PROCEDURE — 80061 LIPID PANEL: CPT | Mod: ORL | Performed by: FAMILY MEDICINE

## 2022-06-30 ENCOUNTER — OFFICE VISIT (OUTPATIENT)
Dept: CARDIOLOGY | Facility: CLINIC | Age: 72
End: 2022-06-30
Payer: MEDICARE

## 2022-06-30 VITALS
HEART RATE: 102 BPM | DIASTOLIC BLOOD PRESSURE: 74 MMHG | HEIGHT: 62 IN | BODY MASS INDEX: 23.92 KG/M2 | SYSTOLIC BLOOD PRESSURE: 120 MMHG | RESPIRATION RATE: 16 BRPM | WEIGHT: 130 LBS

## 2022-06-30 DIAGNOSIS — I27.20 PULMONARY HYPERTENSION (H): Primary | ICD-10-CM

## 2022-06-30 PROCEDURE — 99215 OFFICE O/P EST HI 40 MIN: CPT | Performed by: INTERNAL MEDICINE

## 2022-06-30 NOTE — LETTER
6/30/2022    Meg Kahn MD  [facility] [address]  [facility] [address]    RE: Patty Soliman       Dear Colleague,     I had the pleasure of seeing Patty Soliman in the [facility].    HEART CARE ENCOUNTER CONSULTATON NOTE      [facility]  [phone]      Assessment/Recommendations   Assessment:    1.  Pulmonary hypertension: May be mainly who group 3.  She has known pulmonary fibrosis.  Will recommend further work-up.  I referred her to the pulmonary hypertension clinic to see .  I think she would benefit from right heart catheterization for further evaluation and to help with management.  Unclear if she has left-sided disease and may benefit from further diuretics.  She had evidence of pulmonary arterial dilation on a CT coronary angiogram back in 2017.  2.    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 Lunsford  3.  Diabetes mellitus type 2  4.  Dyslipidemia  5.  Nonobstructive coronary disease on CT coronary angiogram 2017  6.  Chronic hypoxic respiratory failure and O2 supplementation  7.  Kyphoscoliosis     Plan:  1.    Recommend right heart catheterization and will discuss further with   2.  Consider adding loop diuretics  3.    Continue O2 supplementation     History of Present Illness/Subjective    HPI: Patty Soliman is a 72 year old female with history of pulmonary fibrosis, diabetes mellitus type 2, nonobstructive coronary artery disease on angiogram in 2017, heart failure with preserved ejection fraction who I am seeing today for follow-up.  I last saw her a year ago.  Over the past few months she has decompensated.  She has had problems with progressive shortness of breath and is now on O2 supplementation.  She is on 2 L of oxygen chronically with 3 to 5 L with exertion.  She denies any chest pain.  She denies any lower extremity edema orthopnea.  She was seen by her pulmonologist Dr. Lunsford  "who has recommended that she consider starting 2 different medications for pulmonary fibrosis and recommended complete function tests in 2 to 3 months.  Repeat CT chest showed mild progression of fibrosis.  Repeat echocardiogram this year showed significant pulmonary hypertension with an RVSP of 60 and now development of RV dilatation with RV dysfunction.  Preserved left ventricular systolic function.         Physical Examination  Review of Systems   Vitals: /74 (BP Location: Left arm, Patient Position: Sitting, Cuff Size: Adult Regular)   Pulse 102   Resp 16   Ht 1.575 m (5' 2\")   Wt 59 kg (130 lb)   BMI 23.78 kg/m    BMI= Body mass index is 23.78 kg/m .  Wt Readings from Last 3 Encounters:   06/30/22 59 kg (130 lb)   04/01/21 68 kg (150 lb)   04/18/18 74.4 kg (164 lb)       General Appearance:   no distress, normal body habitus   ENT/Mouth: membranes moist, no oral lesions or bleeding gums.      EYES:  no scleral icterus, normal conjunctivae   Neck: no carotid bruits or thyromegaly   Chest/Lungs:   lungs are clear to auscultation   Cardiovascular:   Regular. Normal first and second heart sounds with no murmurs  no edema bilaterally    Abdomen:  no organomegaly, masses, bruits, or tenderness; bowel sounds are present   Extremities: no cyanosis or clubbing   Skin: no xanthelasma, warm.    Neurologic: normal  bilateral, no tremors     Psychiatric: alert and oriented x3, calm        Please refer above for cardiac ROS details.        Medical History  Surgical History Family History Social History   Past Medical History:   Diagnosis Date     Cancer (H)      Diabetes mellitus (H)     Type 2 Diabetic     Hypertension      Past Surgical History:   Procedure Laterality Date     DC MASTECTOMY,PARTIAL,  WITH AXILLARY LYMPHADENECTOMY Right 4/18/2018    Procedure: Right Lumpectomy after Wire Localization; [place] Lymph Node Biopsy;  Surgeon: Katie Sahu MD;  Location: [facility];  Service: General     " TUBAL LIGATION       No family history of heart disease   Social History     Socioeconomic History     Marital status:      Spouse name: Not on file     Number of children: Not on file     Years of education: Not on file     Highest education level: Not on file   Occupational History     Not on file   Tobacco Use     Smoking status: Never Smoker     Smokeless tobacco: Never Used   Substance and Sexual Activity     Alcohol use: Yes     Comment: Alcoholic Drinks/day: rarely     Drug use: No     Sexual activity: Not on file   Other Topics Concern     Not on file   Social History Narrative     Not on file     Social Determinants of Health     Financial Resource Strain: Not on file   Food Insecurity: Not on file   Transportation Needs: Not on file   Physical Activity: Not on file   Stress: Not on file   Social Connections: Not on file   Intimate Partner Violence: Not on file   Housing Stability: Not on file           Medications  Allergies   Current Outpatient Medications   Medication Sig Dispense Refill     aspirin 325 MG tablet [ASPIRIN 325 MG TABLET] daily.       atorvastatin (LIPITOR) 10 MG tablet [ATORVASTATIN (LIPITOR) 10 MG TABLET] daily.       cholecalciferol, vitamin D3, 1,000 unit tablet [CHOLECALCIFEROL, VITAMIN D3, 1,000 UNIT TABLET] Take 2,000 Units by mouth daily.       coenzyme Q10 200 mg capsule [COENZYME Q10 200 MG CAPSULE] Take 200 mg by mouth daily.       dorzolamide-timolol (COSOPT) 22.3-6.8 mg/mL ophthalmic solution [DORZOLAMIDE-TIMOLOL (COSOPT) 22.3-6.8 MG/ML OPHTHALMIC SOLUTION] 1 gtt       fluticasone (FLONASE) 50 mcg/actuation nasal spray [FLUTICASONE (FLONASE) 50 MCG/ACTUATION NASAL SPRAY] as needed.       HYDROcodone-acetaminophen 5-325 mg per tablet [HYDROCODONE-ACETAMINOPHEN 5-325 MG PER TABLET] Take 1 tablet by mouth every 4 (four) hours as needed for pain. 25 tablet 0     LACTOBACILLUS COMBO NO.6 (PROBIOTIC COMPLEX ORAL) [LACTOBACILLUS COMBO NO.6 (PROBIOTIC COMPLEX ORAL)] 1        latanoprost (XALATAN) 0.005 % ophthalmic solution [LATANOPROST (XALATAN) 0.005 % OPHTHALMIC SOLUTION] Administer 1 drop to both eyes daily.        letrozole (FEMARA) 2.5 mg tablet [LETROZOLE (FEMARA) 2.5 MG TABLET] Take 1 tablet by mouth daily.       metFORMIN (GLUCOPHAGE) 500 MG tablet [METFORMIN (GLUCOPHAGE) 500 MG TABLET] Take 1,000 mg by mouth daily with breakfast.        metoprolol tartrate (LOPRESSOR) 25 MG tablet [METOPROLOL TARTRATE (LOPRESSOR) 25 MG TABLET] Take 25 mg by mouth daily.        omeprazole (PRILOSEC) 20 MG capsule [OMEPRAZOLE (PRILOSEC) 20 MG CAPSULE] Take 20 mg by mouth daily before breakfast.        potassium chloride SA (K-DUR,KLOR-CON) 20 MEQ tablet [POTASSIUM CHLORIDE SA (K-DUR,KLOR-CON) 20 MEQ TABLET] Take 20 mEq by mouth as needed.        triamterene-hydrochlorothiazide (DYAZIDE) 37.5-25 mg per capsule [TRIAMTERENE-HYDROCHLOROTHIAZIDE (DYAZIDE) 37.5-25 MG PER CAPSULE] Take by mouth daily.       amitriptyline (ELAVIL) 10 MG tablet [AMITRIPTYLINE (ELAVIL) 10 MG TABLET] daily. (Patient not taking: Reported on 6/30/2022)       SIMETHICONE ORAL [SIMETHICONE ORAL] Take by mouth as needed. (Patient not taking: Reported on 6/30/2022)         Allergies   Allergen Reactions     Escitalopram Oxalate [Escitalopram] Unknown     Other reaction(s): fatigue     Sulfa (Sulfonamide Antibiotics) [Sulfa Drugs] Unknown     Sulfamethoxazole-Trimethoprim [Sulfamethoxazole W/Trimethoprim] Unknown     Other reaction(s): Unknown          Lab Results    Chemistry/lipid CBC Cardiac Enzymes/BNP/TSH/INR   Recent Labs   Lab Test 03/29/22  1027   CHOL 116   HDL 36*   LDL 43   TRIG 185*     Recent Labs   Lab Test 03/29/22  1027 01/26/21  1155 01/07/20  1148   LDL 43 34 43     Recent Labs   Lab Test 09/07/21  1146      POTASSIUM 3.5   CHLORIDE 96*   CO2 29   *   BUN 13   CR 0.93   GFRESTIMATED 61   GREGORY 10.4     Recent Labs   Lab Test 09/07/21  1146 05/11/21  0927 01/26/21  1155   CR 0.93 0.97 1.12*     No  results for input(s): A1C in the last 17888 hours.       No results for input(s): WBC, HGB, HCT, MCV, PLT in the last 91990 hours.  No results for input(s): HGB in the last 70813 hours. No results for input(s): TROPONINI in the last 74438 hours.  No results for input(s): BNP, NTBNPI, NTBNP in the last 54552 hours.  Recent Labs   Lab Test 01/26/21  1155   TSH 2.13     No results for input(s): INR in the last 38744 hours.     Annabella Saeed MD    Thank you for allowing me to participate in the care of your patient.      Sincerely,     Annabella Saeed MD   [facility]  cc: Referred Self,

## 2022-06-30 NOTE — PROGRESS NOTES
HEART CARE ENCOUNTER CONSULTATON NOTE      [facility]  [phone]      Assessment/Recommendations   Assessment:    1.  Pulmonary hypertension: May be mainly who group 3.  She has known pulmonary fibrosis.  Will recommend further work-up.  I referred her to the pulmonary hypertension clinic to see .  I think she would benefit from right heart catheterization for further evaluation and to help with management.  Unclear if she has left-sided disease and may benefit from further diuretics.  She had evidence of pulmonary arterial dilation on a CT coronary angiogram back in 2017.  2.    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 Lunsford  3.  Diabetes mellitus type 2  4.  Dyslipidemia  5.  Nonobstructive coronary disease on CT coronary angiogram 2017  6.  Chronic hypoxic respiratory failure and O2 supplementation  7.  Kyphoscoliosis     Plan:  1.    Recommend right heart catheterization and will discuss further with   2.  Consider adding loop diuretics  3.    Continue O2 supplementation     History of Present Illness/Subjective    HPI: Patty Soliman is a 72 year old female with history of pulmonary fibrosis, diabetes mellitus type 2, nonobstructive coronary artery disease on angiogram in 2017, heart failure with preserved ejection fraction who I am seeing today for follow-up.  I last saw her a year ago.  Over the past few months she has decompensated.  She has had problems with progressive shortness of breath and is now on O2 supplementation.  She is on 2 L of oxygen chronically with 3 to 5 L with exertion.  She denies any chest pain.  She denies any lower extremity edema orthopnea.  She was seen by her pulmonologist Dr. Lunsford who has recommended that she consider starting 2 different medications for pulmonary fibrosis and recommended complete function tests in 2 to 3 months.  Repeat CT chest showed mild progression of fibrosis.  Repeat echocardiogram  "this year showed significant pulmonary hypertension with an RVSP of 60 and now development of RV dilatation with RV dysfunction.  Preserved left ventricular systolic function.         Physical Examination  Review of Systems   Vitals: /74 (BP Location: Left arm, Patient Position: Sitting, Cuff Size: Adult Regular)   Pulse 102   Resp 16   Ht 1.575 m (5' 2\")   Wt 59 kg (130 lb)   BMI 23.78 kg/m    BMI= Body mass index is 23.78 kg/m .  Wt Readings from Last 3 Encounters:   06/30/22 59 kg (130 lb)   04/01/21 68 kg (150 lb)   04/18/18 74.4 kg (164 lb)       General Appearance:   no distress, normal body habitus   ENT/Mouth: membranes moist, no oral lesions or bleeding gums.      EYES:  no scleral icterus, normal conjunctivae   Neck: no carotid bruits or thyromegaly   Chest/Lungs:   lungs are clear to auscultation   Cardiovascular:   Regular. Normal first and second heart sounds with no murmurs  no edema bilaterally    Abdomen:  no organomegaly, masses, bruits, or tenderness; bowel sounds are present   Extremities: no cyanosis or clubbing   Skin: no xanthelasma, warm.    Neurologic: normal  bilateral, no tremors     Psychiatric: alert and oriented x3, calm        Please refer above for cardiac ROS details.        Medical History  Surgical History Family History Social History   Past Medical History:   Diagnosis Date     Cancer (H)      Diabetes mellitus (H)     Type 2 Diabetic     Hypertension      Past Surgical History:   Procedure Laterality Date     [place] MASTECTOMY,PARTIAL,  WITH AXILLARY LYMPHADENECTOMY Right 4/18/2018    Procedure: Right Lumpectomy after Wire Localization; [place] Lymph Node Biopsy;  Surgeon: Katie Sahu MD;  Location: [facility];  Service: General     TUBAL LIGATION       No family history of heart disease   Social History     Socioeconomic History     Marital status:      Spouse name: Not on file     Number of children: Not on file     Years of education: Not on file "     Highest education level: Not on file   Occupational History     Not on file   Tobacco Use     Smoking status: Never Smoker     Smokeless tobacco: Never Used   Substance and Sexual Activity     Alcohol use: Yes     Comment: Alcoholic Drinks/day: rarely     Drug use: No     Sexual activity: Not on file   Other Topics Concern     Not on file   Social History Narrative     Not on file     Social Determinants of Health     Financial Resource Strain: Not on file   Food Insecurity: Not on file   Transportation Needs: Not on file   Physical Activity: Not on file   Stress: Not on file   Social Connections: Not on file   Intimate Partner Violence: Not on file   Housing Stability: Not on file           Medications  Allergies   Current Outpatient Medications   Medication Sig Dispense Refill     aspirin 325 MG tablet [ASPIRIN 325 MG TABLET] daily.       atorvastatin (LIPITOR) 10 MG tablet [ATORVASTATIN (LIPITOR) 10 MG TABLET] daily.       cholecalciferol, vitamin D3, 1,000 unit tablet [CHOLECALCIFEROL, VITAMIN D3, 1,000 UNIT TABLET] Take 2,000 Units by mouth daily.       coenzyme Q10 200 mg capsule [COENZYME Q10 200 MG CAPSULE] Take 200 mg by mouth daily.       dorzolamide-timolol (COSOPT) 22.3-6.8 mg/mL ophthalmic solution [DORZOLAMIDE-TIMOLOL (COSOPT) 22.3-6.8 MG/ML OPHTHALMIC SOLUTION] 1 gtt       fluticasone (FLONASE) 50 mcg/actuation nasal spray [FLUTICASONE (FLONASE) 50 MCG/ACTUATION NASAL SPRAY] as needed.       HYDROcodone-acetaminophen 5-325 mg per tablet [HYDROCODONE-ACETAMINOPHEN 5-325 MG PER TABLET] Take 1 tablet by mouth every 4 (four) hours as needed for pain. 25 tablet 0     LACTOBACILLUS COMBO NO.6 (PROBIOTIC COMPLEX ORAL) [LACTOBACILLUS COMBO NO.6 (PROBIOTIC COMPLEX ORAL)] 1       latanoprost (XALATAN) 0.005 % ophthalmic solution [LATANOPROST (XALATAN) 0.005 % OPHTHALMIC SOLUTION] Administer 1 drop to both eyes daily.        letrozole (FEMARA) 2.5 mg tablet [LETROZOLE (FEMARA) 2.5 MG TABLET] Take 1 tablet by  mouth daily.       metFORMIN (GLUCOPHAGE) 500 MG tablet [METFORMIN (GLUCOPHAGE) 500 MG TABLET] Take 1,000 mg by mouth daily with breakfast.        metoprolol tartrate (LOPRESSOR) 25 MG tablet [METOPROLOL TARTRATE (LOPRESSOR) 25 MG TABLET] Take 25 mg by mouth daily.        omeprazole (PRILOSEC) 20 MG capsule [OMEPRAZOLE (PRILOSEC) 20 MG CAPSULE] Take 20 mg by mouth daily before breakfast.        potassium chloride SA (K-DUR,KLOR-CON) 20 MEQ tablet [POTASSIUM CHLORIDE SA (K-DUR,KLOR-CON) 20 MEQ TABLET] Take 20 mEq by mouth as needed.        triamterene-hydrochlorothiazide (DYAZIDE) 37.5-25 mg per capsule [TRIAMTERENE-HYDROCHLOROTHIAZIDE (DYAZIDE) 37.5-25 MG PER CAPSULE] Take by mouth daily.       amitriptyline (ELAVIL) 10 MG tablet [AMITRIPTYLINE (ELAVIL) 10 MG TABLET] daily. (Patient not taking: Reported on 6/30/2022)       SIMETHICONE ORAL [SIMETHICONE ORAL] Take by mouth as needed. (Patient not taking: Reported on 6/30/2022)         Allergies   Allergen Reactions     Escitalopram Oxalate [Escitalopram] Unknown     Other reaction(s): fatigue     Sulfa (Sulfonamide Antibiotics) [Sulfa Drugs] Unknown     Sulfamethoxazole-Trimethoprim [Sulfamethoxazole W/Trimethoprim] Unknown     Other reaction(s): Unknown          Lab Results    Chemistry/lipid CBC Cardiac Enzymes/BNP/TSH/INR   Recent Labs   Lab Test 03/29/22  1027   CHOL 116   HDL 36*   LDL 43   TRIG 185*     Recent Labs   Lab Test 03/29/22  1027 01/26/21  1155 01/07/20  1148   LDL 43 34 43     Recent Labs   Lab Test 09/07/21  1146      POTASSIUM 3.5   CHLORIDE 96*   CO2 29   *   BUN 13   CR 0.93   GFRESTIMATED 61   GREGORY 10.4     Recent Labs   Lab Test 09/07/21  1146 05/11/21  0927 01/26/21  1155   CR 0.93 0.97 1.12*     No results for input(s): A1C in the last 40588 hours.       No results for input(s): WBC, HGB, HCT, MCV, PLT in the last 59393 hours.  No results for input(s): HGB in the last 34057 hours. No results for input(s): TROPONINI in the last  81879 hours.  No results for input(s): BNP, NTBNPI, NTBNP in the last 79640 hours.  Recent Labs   Lab Test 01/26/21  1155   TSH 2.13     No results for input(s): INR in the last 54921 hours.     Annabella Saeed MD

## 2022-07-01 ENCOUNTER — MYC MEDICAL ADVICE (OUTPATIENT)
Dept: CARDIOLOGY | Facility: CLINIC | Age: 72
End: 2022-07-01

## 2022-07-06 DIAGNOSIS — I27.20 PULMONARY HYPERTENSION (H): Primary | ICD-10-CM

## 2022-07-06 DIAGNOSIS — R06.02 SHORTNESS OF BREATH: ICD-10-CM

## 2022-07-06 DIAGNOSIS — R79.9 ABNORMAL FINDING OF BLOOD CHEMISTRY, UNSPECIFIED: ICD-10-CM

## 2022-07-06 DIAGNOSIS — Z11.59 ENCOUNTER FOR SCREENING FOR OTHER VIRAL DISEASES: ICD-10-CM

## 2022-07-06 DIAGNOSIS — R06.09 DOE (DYSPNEA ON EXERTION): ICD-10-CM

## 2022-07-06 DIAGNOSIS — R79.1 ABNORMAL COAGULATION PROFILE: ICD-10-CM

## 2022-07-06 DIAGNOSIS — I51.89 OTHER ILL-DEFINED HEART DISEASES: ICD-10-CM

## 2022-07-06 RX ORDER — LIDOCAINE 40 MG/G
CREAM TOPICAL
Status: CANCELLED | OUTPATIENT
Start: 2022-07-06

## 2022-07-13 ENCOUNTER — HOSPITAL ENCOUNTER (OUTPATIENT)
Dept: CARDIOLOGY | Facility: HOSPITAL | Age: 72
Discharge: HOME | End: 2022-07-13
Attending: INTERNAL MEDICINE | Admitting: INTERNAL MEDICINE
Payer: MEDICARE

## 2022-07-13 DIAGNOSIS — I27.20 PULMONARY HYPERTENSION (H): ICD-10-CM

## 2022-07-13 PROCEDURE — 93226 XTRNL ECG REC<48 HR SCAN A/R: CPT

## 2022-07-23 PROCEDURE — 93227 XTRNL ECG REC<48 HR R&I: CPT | Performed by: INTERNAL MEDICINE

## 2022-07-27 ENCOUNTER — TELEPHONE (OUTPATIENT)
Dept: CARDIOLOGY | Facility: CLINIC | Age: 72
End: 2022-07-27

## 2022-07-27 NOTE — TELEPHONE ENCOUNTER
[facility] Call Center    Phone Message    May a detailed message be left on voicemail: yes     Reason for Call: Other: Patient would like a call back.  Patient had a fainting episode last night where she hit her head. She would like to know if her heart monitor results have been received.     Action Taken: Other: routed to cardiology    Travel Screening: Not Applicable

## 2022-07-28 NOTE — TELEPHONE ENCOUNTER
Spoke with patient.  She believes she may have fainted because her O2 tank wasn't working properly. Now the problem is fixed and she is feeling better.  If recurs could consider an event monitor.   Reviewed results of holter with her.

## 2022-08-01 ENCOUNTER — LAB (OUTPATIENT)
Dept: LAB | Facility: CLINIC | Age: 72
End: 2022-08-01
Payer: MEDICARE

## 2022-08-01 ENCOUNTER — HOSPITAL ENCOUNTER (OUTPATIENT)
Dept: CARDIOLOGY | Facility: CLINIC | Age: 72
Discharge: HOME | End: 2022-08-01
Attending: INTERNAL MEDICINE
Payer: MEDICARE

## 2022-08-01 ENCOUNTER — HOSPITAL ENCOUNTER (OUTPATIENT)
Dept: CT IMAGING | Facility: CLINIC | Age: 72
Discharge: HOME | End: 2022-08-01
Attending: INTERNAL MEDICINE
Payer: MEDICARE

## 2022-08-01 DIAGNOSIS — Z11.59 ENCOUNTER FOR SCREENING FOR OTHER VIRAL DISEASES: ICD-10-CM

## 2022-08-01 DIAGNOSIS — R06.09 DOE (DYSPNEA ON EXERTION): ICD-10-CM

## 2022-08-01 DIAGNOSIS — I27.20 PULMONARY HYPERTENSION (H): ICD-10-CM

## 2022-08-01 DIAGNOSIS — I51.89 OTHER ILL-DEFINED HEART DISEASES: ICD-10-CM

## 2022-08-01 DIAGNOSIS — R06.02 SHORTNESS OF BREATH: ICD-10-CM

## 2022-08-01 DIAGNOSIS — R79.9 ABNORMAL FINDING OF BLOOD CHEMISTRY, UNSPECIFIED: ICD-10-CM

## 2022-08-01 DIAGNOSIS — R79.1 ABNORMAL COAGULATION PROFILE: ICD-10-CM

## 2022-08-01 LAB
6 MIN WALK (FT): 550 FT
6 MIN WALK (M): 168 M
ALBUMIN SERPL-MCNC: 3.7 G/DL (ref 3.4–5)
ALP SERPL-CCNC: 152 U/L (ref 40–150)
ALT SERPL W P-5'-P-CCNC: 24 U/L (ref 0–50)
ANION GAP SERPL CALCULATED.3IONS-SCNC: 9 MMOL/L (ref 3–14)
AST SERPL W P-5'-P-CCNC: 23 U/L (ref 0–45)
BILIRUB DIRECT SERPL-MCNC: 0.3 MG/DL (ref 0–0.2)
BILIRUB SERPL-MCNC: 0.8 MG/DL (ref 0.2–1.3)
BUN SERPL-MCNC: 26 MG/DL (ref 7–30)
CALCIUM SERPL-MCNC: 10.1 MG/DL (ref 8.5–10.1)
CHLORIDE BLD-SCNC: 98 MMOL/L (ref 94–109)
CHOLEST SERPL-MCNC: 83 MG/DL
CO2 SERPL-SCNC: 30 MMOL/L (ref 20–32)
CREAT SERPL-MCNC: 1.3 MG/DL (ref 0.52–1.04)
CRP SERPL-MCNC: 5.6 MG/L (ref 0–8)
ERYTHROCYTE [DISTWIDTH] IN BLOOD BY AUTOMATED COUNT: 14.1 % (ref 10–15)
FASTING STATUS PATIENT QL REPORTED: NO
GFR SERPL CREATININE-BSD FRML MDRD: 43 ML/MIN/1.73M2
GLUCOSE BLD-MCNC: 122 MG/DL (ref 70–99)
HBV CORE AB SERPL QL IA: NONREACTIVE
HBV SURFACE AB SERPL IA-ACNC: 0 M[IU]/ML
HBV SURFACE AG SERPL QL IA: NONREACTIVE
HCT VFR BLD AUTO: 46.8 % (ref 35–47)
HCV AB SERPL QL IA: NONREACTIVE
HDLC SERPL-MCNC: 37 MG/DL
HGB BLD-MCNC: 15 G/DL (ref 11.7–15.7)
HIV 1+2 AB+HIV1 P24 AG SERPL QL IA: NONREACTIVE
INR PPP: 1.16 (ref 0.85–1.15)
IRON SATN MFR SERPL: 19 % (ref 15–46)
IRON SERPL-MCNC: 74 UG/DL (ref 35–180)
LDLC SERPL CALC-MCNC: 19 MG/DL
LVEF ECHO: NORMAL
MCH RBC QN AUTO: 29.2 PG (ref 26.5–33)
MCHC RBC AUTO-ENTMCNC: 32.1 G/DL (ref 31.5–36.5)
MCV RBC AUTO: 91 FL (ref 78–100)
NONHDLC SERPL-MCNC: 46 MG/DL
NT-PROBNP SERPL-MCNC: ABNORMAL PG/ML (ref 0–900)
PLATELET # BLD AUTO: 165 10E3/UL (ref 150–450)
POTASSIUM BLD-SCNC: 3.6 MMOL/L (ref 3.4–5.3)
PROT SERPL-MCNC: 7.8 G/DL (ref 6.8–8.8)
RBC # BLD AUTO: 5.13 10E6/UL (ref 3.8–5.2)
SODIUM SERPL-SCNC: 137 MMOL/L (ref 133–144)
TIBC SERPL-MCNC: 381 UG/DL (ref 240–430)
TRIGL SERPL-MCNC: 137 MG/DL
TSH SERPL DL<=0.005 MIU/L-ACNC: 1.89 MU/L (ref 0.4–4)
WBC # BLD AUTO: 12.3 10E3/UL (ref 4–11)

## 2022-08-01 PROCEDURE — 93306 TTE W/DOPPLER COMPLETE: CPT | Mod: 26 | Performed by: STUDENT IN AN ORGANIZED HEALTH CARE EDUCATION/TRAINING PROGRAM

## 2022-08-01 PROCEDURE — 86803 HEPATITIS C AB TEST: CPT | Performed by: INTERNAL MEDICINE

## 2022-08-01 PROCEDURE — 82248 BILIRUBIN DIRECT: CPT | Performed by: PATHOLOGY

## 2022-08-01 PROCEDURE — 94729 DIFFUSING CAPACITY: CPT | Performed by: INTERNAL MEDICINE

## 2022-08-01 PROCEDURE — 85027 COMPLETE CBC AUTOMATED: CPT | Performed by: PATHOLOGY

## 2022-08-01 PROCEDURE — 87340 HEPATITIS B SURFACE AG IA: CPT | Performed by: INTERNAL MEDICINE

## 2022-08-01 PROCEDURE — 87389 HIV-1 AG W/HIV-1&-2 AB AG IA: CPT | Performed by: INTERNAL MEDICINE

## 2022-08-01 PROCEDURE — 255N000002 HC RX 255 OP 636: Performed by: STUDENT IN AN ORGANIZED HEALTH CARE EDUCATION/TRAINING PROGRAM

## 2022-08-01 PROCEDURE — 80053 COMPREHEN METABOLIC PANEL: CPT | Performed by: PATHOLOGY

## 2022-08-01 PROCEDURE — 84443 ASSAY THYROID STIM HORMONE: CPT | Performed by: PATHOLOGY

## 2022-08-01 PROCEDURE — 83880 ASSAY OF NATRIURETIC PEPTIDE: CPT | Performed by: PATHOLOGY

## 2022-08-01 PROCEDURE — 86431 RHEUMATOID FACTOR QUANT: CPT | Performed by: INTERNAL MEDICINE

## 2022-08-01 PROCEDURE — 86704 HEP B CORE ANTIBODY TOTAL: CPT | Performed by: INTERNAL MEDICINE

## 2022-08-01 PROCEDURE — 83529 ASAY OF INTERLEUKIN-6 (IL-6): CPT | Performed by: PATHOLOGY

## 2022-08-01 PROCEDURE — 94618 PULMONARY STRESS TESTING: CPT | Performed by: INTERNAL MEDICINE

## 2022-08-01 PROCEDURE — 36415 COLL VENOUS BLD VENIPUNCTURE: CPT | Performed by: PATHOLOGY

## 2022-08-01 PROCEDURE — 86140 C-REACTIVE PROTEIN: CPT | Performed by: PATHOLOGY

## 2022-08-01 PROCEDURE — 86038 ANTINUCLEAR ANTIBODIES: CPT | Performed by: INTERNAL MEDICINE

## 2022-08-01 PROCEDURE — 80061 LIPID PANEL: CPT | Performed by: PATHOLOGY

## 2022-08-01 PROCEDURE — 86706 HEP B SURFACE ANTIBODY: CPT | Performed by: INTERNAL MEDICINE

## 2022-08-01 PROCEDURE — 83550 IRON BINDING TEST: CPT | Performed by: PATHOLOGY

## 2022-08-01 PROCEDURE — 99000 SPECIMEN HANDLING OFFICE-LAB: CPT | Performed by: PATHOLOGY

## 2022-08-01 PROCEDURE — G1010 CDSM STANSON: HCPCS

## 2022-08-01 PROCEDURE — 94726 PLETHYSMOGRAPHY LUNG VOLUMES: CPT | Performed by: INTERNAL MEDICINE

## 2022-08-01 PROCEDURE — 94375 RESPIRATORY FLOW VOLUME LOOP: CPT | Performed by: INTERNAL MEDICINE

## 2022-08-01 PROCEDURE — G1010 CDSM STANSON: HCPCS | Mod: GC | Performed by: RADIOLOGY

## 2022-08-01 PROCEDURE — 250N000011 HC RX IP 250 OP 636: Performed by: INTERNAL MEDICINE

## 2022-08-01 PROCEDURE — 85730 THROMBOPLASTIN TIME PARTIAL: CPT | Performed by: INTERNAL MEDICINE

## 2022-08-01 PROCEDURE — 84238 ASSAY NONENDOCRINE RECEPTOR: CPT | Mod: 90 | Performed by: PATHOLOGY

## 2022-08-01 PROCEDURE — 85390 FIBRINOLYSINS SCREEN I&R: CPT | Mod: 26 | Performed by: PATHOLOGY

## 2022-08-01 PROCEDURE — 85610 PROTHROMBIN TIME: CPT | Performed by: PATHOLOGY

## 2022-08-01 PROCEDURE — 71275 CT ANGIOGRAPHY CHEST: CPT | Mod: 26 | Performed by: RADIOLOGY

## 2022-08-01 RX ORDER — IOPAMIDOL 755 MG/ML
75 INJECTION, SOLUTION INTRAVASCULAR ONCE
Status: COMPLETED | OUTPATIENT
Start: 2022-08-01 | End: 2022-08-01

## 2022-08-01 RX ADMIN — IOPAMIDOL 75 ML: 755 INJECTION, SOLUTION INTRAVENOUS at 07:36

## 2022-08-01 RX ADMIN — HUMAN ALBUMIN MICROSPHERES AND PERFLUTREN 6 ML: 10; .22 INJECTION, SOLUTION INTRAVENOUS at 09:08

## 2022-08-02 LAB
ANA SER QL IF: NEGATIVE
DLCOCOR-%PRED-PRE: 34 %
DLCOCOR-PRE: 6.19 ML/MIN/MMHG
DLCOUNC-%PRED-PRE: 36 %
DLCOUNC-PRE: 6.47 ML/MIN/MMHG
DLCOUNC-PRED: 17.91 ML/MIN/MMHG
DRVVT SCREEN RATIO: 1.04
ERV-%PRED-PRE: 83 %
ERV-PRE: 0.54 L
ERV-PRED: 0.64 L
EXPTIME-PRE: 5.21 SEC
FEF2575-%PRED-PRE: 110 %
FEF2575-PRE: 1.83 L/SEC
FEF2575-PRED: 1.66 L/SEC
FEFMAX-%PRED-PRE: 121 %
FEFMAX-PRE: 6.11 L/SEC
FEFMAX-PRED: 5.03 L/SEC
FEV1-%PRED-PRE: 81 %
FEV1-PRE: 1.6 L
FEV1FEV6-PRE: 85 %
FEV1FEV6-PRED: 79 %
FEV1FVC-PRE: 85 %
FEV1FVC-PRED: 78 %
FEV1SVC-PRE: 84 %
FEV1SVC-PRED: 72 %
FIFMAX-PRE: 4.19 L/SEC
FRCPLETH-%PRED-PRE: 96 %
FRCPLETH-PRE: 2.52 L
FRCPLETH-PRED: 2.6 L
FVC-%PRED-PRE: 73 %
FVC-PRE: 1.87 L
FVC-PRED: 2.53 L
IC-%PRED-PRE: 64 %
IC-PRE: 1.34 L
IC-PRED: 2.08 L
IL6 SERPL-MCNC: 6.18 PG/ML
LA PPP-IMP: NEGATIVE
LUPUS INTERPRETATION: NORMAL
PTT RATIO: 1.06
RHEUMATOID FACT SER NEPH-ACNC: 9 IU/ML
RVPLETH-%PRED-PRE: 96 %
RVPLETH-PRE: 1.97 L
RVPLETH-PRED: 2.03 L
THROMBIN TIME: 18.3 SECONDS (ref 13–19)
TLCPLETH-%PRED-PRE: 83 %
TLCPLETH-PRE: 3.86 L
TLCPLETH-PRED: 4.6 L
VA-%PRED-PRE: 65 %
VA-PRE: 2.79 L
VC-%PRED-PRE: 69 %
VC-PRE: 1.9 L
VC-PRED: 2.72 L

## 2022-08-03 LAB — STFR SERPL-MCNC: 4.7 MG/L

## 2022-08-12 ASSESSMENT — ENCOUNTER SYMPTOMS
HEADACHES: 1
EYE WATERING: 0
DEPRESSION: 1
SPEECH CHANGE: 0
EYE REDNESS: 0
MUSCLE WEAKNESS: 1
BLOATING: 0
CONSTIPATION: 0
PARALYSIS: 0
SEIZURES: 0
COUGH: 1
EYE PAIN: 0
LEG PAIN: 1
HOARSE VOICE: 1
STIFFNESS: 0
CHILLS: 0
PANIC: 0
SMELL DISTURBANCE: 0
TASTE DISTURBANCE: 0
ORTHOPNEA: 0
NECK PAIN: 1
EXERCISE INTOLERANCE: 1
WHEEZING: 0
LOSS OF CONSCIOUSNESS: 0
TREMORS: 0
POLYPHAGIA: 0
NECK MASS: 0
TROUBLE SWALLOWING: 1
SORE THROAT: 0
DIZZINESS: 0
DECREASED APPETITE: 1
HALLUCINATIONS: 0
SLEEP DISTURBANCES DUE TO BREATHING: 0
FATIGUE: 1
BACK PAIN: 1
HYPOTENSION: 0
MYALGIAS: 1
NAIL CHANGES: 0
DYSPNEA ON EXERTION: 1
ARTHRALGIAS: 0
POOR WOUND HEALING: 0
POSTURAL DYSPNEA: 0
FEVER: 0
WEAKNESS: 1
HYPERTENSION: 0
BLOOD IN STOOL: 0
DIARRHEA: 0
INCREASED ENERGY: 1
SKIN CHANGES: 0
BOWEL INCONTINENCE: 0
EYE IRRITATION: 1
JOINT SWELLING: 0
PALPITATIONS: 1
INSOMNIA: 0
SYNCOPE: 1
HEMOPTYSIS: 0
MUSCLE CRAMPS: 1
NIGHT SWEATS: 0
NAUSEA: 0
JAUNDICE: 0
NERVOUS/ANXIOUS: 1
TINGLING: 0
SNORES LOUDLY: 0
WEIGHT GAIN: 0
WEIGHT LOSS: 0
MEMORY LOSS: 0
DECREASED CONCENTRATION: 0
SINUS CONGESTION: 1
SHORTNESS OF BREATH: 1
HEARTBURN: 1
LIGHT-HEADEDNESS: 1
POLYDIPSIA: 0
ALTERED TEMPERATURE REGULATION: 0
DOUBLE VISION: 0
DISTURBANCES IN COORDINATION: 1
RECTAL PAIN: 0
VOMITING: 1
NUMBNESS: 0
COUGH DISTURBING SLEEP: 0
SPUTUM PRODUCTION: 0
SINUS PAIN: 0
ABDOMINAL PAIN: 1

## 2022-08-15 ENCOUNTER — OFFICE VISIT (OUTPATIENT)
Dept: CARDIOLOGY | Facility: CLINIC | Age: 72
End: 2022-08-15
Attending: INTERNAL MEDICINE
Payer: MEDICARE

## 2022-08-15 VITALS
OXYGEN SATURATION: 92 % | DIASTOLIC BLOOD PRESSURE: 83 MMHG | HEIGHT: 62 IN | BODY MASS INDEX: 24.09 KG/M2 | SYSTOLIC BLOOD PRESSURE: 129 MMHG | HEART RATE: 92 BPM | WEIGHT: 130.9 LBS

## 2022-08-15 DIAGNOSIS — I27.20 PULMONARY HYPERTENSION (H): ICD-10-CM

## 2022-08-15 PROCEDURE — 99215 OFFICE O/P EST HI 40 MIN: CPT | Performed by: INTERNAL MEDICINE

## 2022-08-15 PROCEDURE — G0463 HOSPITAL OUTPT CLINIC VISIT: HCPCS

## 2022-08-15 PROCEDURE — 99417 PROLNG OP E/M EACH 15 MIN: CPT | Performed by: INTERNAL MEDICINE

## 2022-08-15 ASSESSMENT — PAIN SCALES - GENERAL: PAINLEVEL: NO PAIN (0)

## 2022-08-15 NOTE — LETTER
8/15/2022      RE: Patty Soliman  [address]  [facility] [address]       Dear Colleague,    Thank you for the opportunity to participate in the care of your patient, Patty Soliman, at the [facility] [place] [facility]. Please see a copy of my visit note below.      Dear Dr. Wise,    We had the pleasure of seeing Ms. Soliman in our pulmonary hypertension clinic at [facility].  Although you are familiar with her history, please allow me to summarize it for the purpose of her records.    She is a pleasant 72-year-old female with exertional shortness of breath for the last 4 years it has been progressively getting worse.  She saw Dr. Annabella Saeed in April of this year for her with worsening exertional shortness of breath.  Work-up at the time revealed right ventricular dilatation and dysfunction.  Her estimated right ventricular systolic pressure was elevated.  Her left ventricle was normal in size and function.  Her Holter revealed Wenckebach phenomena.  She also had previous presyncopal symptoms.  In light of this she was referred to us for further evaluation and treatment of possible pulm hypertension.    Her main symptoms is exertional shortness of breath.  She can do her basic activities with supplemental oxygen.  She can climb 1 flight of stairs at a slow pace.  She cannot walk more than a block.  She has been having lightheadedness dizziness especially at nighttime when she gets up to go to the bathroom.  No exertional chest pain or chest pressure.  She has been having lower extremity swelling for which she is on diuretics.  She was recently started on supplemental oxygen therapy by her pulmonologist.  No recent hospitalization or ER visit.  She is currently functional class IIIb.  She does have chronic cough but no hemoptysis          Past Medical History:   Diagnosis Date     Cancer (H)      Diabetes mellitus (H)      Type 2 Diabetic     Hypertension        Past Surgical History:   Procedure Laterality Date     CT MASTECTOMY,PARTIAL,  WITH AXILLARY LYMPHADENECTOMY Right 4/18/2018    Procedure: Right Lumpectomy after Wire Localization; [place] Lymph Node Biopsy;  Surgeon: Katie Sahu MD;  Location: [facility];  Service: General     TUBAL LIGATION             Social History     Tobacco Use     Smoking status: Never Smoker     Smokeless tobacco: Never Used   Substance Use Topics     Alcohol use: Yes     Comment: Alcoholic Drinks/day: rarely     Family history  Nil significant    Current Outpatient Medications   Medication Sig     aspirin 325 MG tablet [ASPIRIN 325 MG TABLET] daily.     atorvastatin (LIPITOR) 10 MG tablet [ATORVASTATIN (LIPITOR) 10 MG TABLET] daily.     cholecalciferol, vitamin D3, 1,000 unit tablet [CHOLECALCIFEROL, VITAMIN D3, 1,000 UNIT TABLET] Take 2,000 Units by mouth daily.     coenzyme Q10 200 mg capsule [COENZYME Q10 200 MG CAPSULE] Take 200 mg by mouth daily.     dorzolamide-timolol (COSOPT) 22.3-6.8 mg/mL ophthalmic solution [DORZOLAMIDE-TIMOLOL (COSOPT) 22.3-6.8 MG/ML OPHTHALMIC SOLUTION] 1 gtt     fluticasone (FLONASE) 50 mcg/actuation nasal spray [FLUTICASONE (FLONASE) 50 MCG/ACTUATION NASAL SPRAY] as needed.     latanoprost (XALATAN) 0.005 % ophthalmic solution [LATANOPROST (XALATAN) 0.005 % OPHTHALMIC SOLUTION] Administer 1 drop to both eyes daily.      letrozole (FEMARA) 2.5 mg tablet [LETROZOLE (FEMARA) 2.5 MG TABLET] Take 1 tablet by mouth daily.     metFORMIN (GLUCOPHAGE) 500 MG tablet [METFORMIN (GLUCOPHAGE) 500 MG TABLET] Take 1,000 mg by mouth daily with breakfast.      metoprolol tartrate (LOPRESSOR) 25 MG tablet [METOPROLOL TARTRATE (LOPRESSOR) 25 MG TABLET] Take 25 mg by mouth daily.      omeprazole (PRILOSEC) 20 MG capsule [OMEPRAZOLE (PRILOSEC) 20 MG CAPSULE] Take 20 mg by mouth daily before breakfast.      triamterene-hydrochlorothiazide (DYAZIDE) 37.5-25 mg per capsule  "[TRIAMTERENE-HYDROCHLOROTHIAZIDE (DYAZIDE) 37.5-25 MG PER CAPSULE] Take by mouth daily.     LACTOBACILLUS COMBO NO.6 (PROBIOTIC COMPLEX ORAL) [LACTOBACILLUS COMBO NO.6 (PROBIOTIC COMPLEX ORAL)] 1     No current facility-administered medications for this visit.     ROS:   Constitutional: No fever, chills, or sweats. No weight gain/loss  ENT: No visual disturbance, ear ache, epistaxis, sore throat  Allergies/Immunologic: Negative   Respiratory: She does have a dry cough on and off.  Cardiovascular: As per HPI  GI: No nausea, vomiting, hematemesis, melena, or hematochezia   : No urinary frequency, dysuria, or hematuria  Integument: Negative  Psychiatric: Negative   Neuro: Negative  Endocrinology: Negative   Musculoskeletal: Negative    EXAM:   /83 (BP Location: Right arm, Patient Position: Chair, Cuff Size: Adult Regular)   Pulse 92   Ht 1.58 m (5' 2.21\")   Wt 59.4 kg (130 lb 14.4 oz)   SpO2 92%   BMI 23.78 kg/m     She was awake, alert, oriented x3.  She was comfortable.  She was in no apparent distress.  She was using 2 L of supplemental oxygen.  She had no pallor, cyanosis or jaundice.  Her neck exam revealed prominent jugular venous distention at 830 minutes of her manubrium sternal angle.  Carotids were 1+ bilaterally.  Her pulse was regular and rhythm.  Cardiac auscultation revealed normal S1 loud P2 no murmur rub or gallop.  Auscultation of her lungs revealed bilateral basilar end inspiratory crepitation.  No wheezing.  Her abdomen was soft with normal was no tenderness no rigidity no guarding.  She had no focal neurological deficit.    Labs:    CBC RESULTS:   Recent Labs   Lab Test 08/19/22  1540 08/19/22  1459 08/19/22  1306   WBC  --   --  11.3*   RBC  --   --  4.56   HGB 13.5   < > 13.8   HCT  --   --  42.7   MCV  --   --  94   MCH  --   --  30.3   MCHC  --   --  32.3   RDW  --   --  15.0   PLT  --   --  217    < > = values in this interval not displayed.       Recent Labs   Lab Test " 08/19/22  1306 08/01/22  1033    137   POTASSIUM 3.7 3.6   CHLORIDE 97* 98   CO2 26 30   ANIONGAP 13 9   * 122*   BUN 24.3* 26   CR 1.30* 1.30*   GREGORY 10.0 10.1       Lab Results   Component Value Date    IRON 74 08/01/2022     Lab Results   Component Value Date    STRE 4.7 (H) 08/01/2022     Lab Results   Component Value Date    NTBNPI 9,393 (H) 08/19/2022     Lab Results   Component Value Date    NTBNP 11,730 (H) 08/01/2022     Her serological work-up for pulmonary hypertension including antinuclear antibody rheumatoid factor HIV hepatitis and TSH were unremarkable..    Electrocardiogram (07/2022):  Sinus rhythm with left anterior fascicular block    Echocardiogram (08/2022):  Left ventricular function is normal.The ejection fraction is 60-65%.  Paradoxical septal motion consistent with right ventricular pressure and  volume overload is present.  Moderate to severe right ventricular dilation is present. The RV function is  moderately to severely reduced. The RVFAC is 30%, the TAPSE is 1.6 cm, and the  RV S' is 6 cm/s.  There is early shunting of saline on the bubble study. This is consistent with  an intracardiac shunt, likely due to a patent foramen ovale.  Moderate to severe tricuspid insufficiency is present.  The estimated PA systolic pressure is at least 92 mmHg.  There is mild dilation of the ascending aorta (3.7 cm, indexed value 2.31  cm/m2).  IVC diameter >2.1 cm collapsing <50% with sniff suggests a high RA pressure  estimated at 15 mmHg or greater.  No pericardial effusion is present.  There is no prior study for direct comparison.      RHC (08/2022)  Baseline Hemodynamics:  RA 9/16/9 mmHg  RV 65/11 mmHg  PA 64/20/37 mmHg  CWP 21/22/13 mmHg  CO (Terrance) 1.77 L/min  CI (Terrance) 1.11 L/min/m2  CO (TD) 2.45 L/min  CI (TD) 1.54 L/min/m2   mmHg   SVR 4,338 dynes (by measured Terrance)  PVR 13.56 GRAFF (by measured Terrance)  PVR 9.8 (by TD)    During Nitric Oxide:  RA 12/8/7 mmHg  PA 49/17/30 mmHg  CWP  4/4/4 mmHg  CO (TD) 3.4 L/min  CI (TD) 2.13 L/min/m2  Calculated cardiac output and index not included as the baseline hemodynamics were performed using measured Terrance.    mmHg   SVR 2,188 dynes  PVR 7.64 GRAFF    Shunt Run saturations:   SVC: 59%  IVC: 72% (syringe in contact with room air prior to processing)   RA: 56%  RV: 55%  PA: 56%    Right sided filling pressures are mildly elevated. Left sided filling pressures are mildly elevated. Moderately elevated pulmonary artery hypertension. Reduced cardiac output level.    Pulmonary function test (August 2022)  Pulmonary function tests showed an FVC of 73% predicted, FEV1 of 81% predicted, FEV1 by FVC  of 78% predicted, TLC of 83% predicted, and DLCO of 36% predicted.    Dual-energy CT PE (August 2022)  Dual-energy CT pulmonary angiogram showed no evidence of chronic thromboembolic pulm hypertension.  Patient had evidence of usual interstitial pneumonitis.    6MWT (08/2022)  On her 6-minute walk test she walked 168 m only.  She required 4 L of supplemental oxygen to maintain a saturation of 90%.    Assessment and Plan:   72-year-old female with past medicine for diabetes hypertension and a recent diagnosis of interstitial lung disease.  She was referred to us for further evaluation management of pulmonary hypertension.    Impression  Pulmonary arterial hypertension out of proportion to interstitial lung disease  Moderate to severe RV dysfunction    She is currently functional class III.  Her NT proBNP is significantly elevated.  Her 6-minute walk distance is significantly reduced.  Her right ventricle size and function are moderate to severely affected on her echocardiogram.  Her cardiac catheterization showed significantly reduced cardiac index.  Her right-sided filling pressures are high normal.    I have recommended her to start inhaled treprostinil as a first line of therapy.  We will start her at 3 breaths and gradually increase it every third day as  tolerated to maximum of 12 to 15 breaths 4 times a day.  We will start with the dry powdered inhaler as a first.  We will also start her on phosphodiesterase inhibitor in 2 to 4 weeks after starting inhaled treprostinil.  PDE 5 inhibitors have shown to improve right ventricular size and function on posthoc analysis.  We will do dual combination therapy as her pulmonary hypertension and RV dysfunction are severe.  Her overall prognosis is poor with the limited survival.  She is not a candidate for lung transplantation because of her age.    We will also start her on digoxin for right ventricular inotropic support.  We will continue her on the current dose of diuretics.  She will continue to use 4 L of oxygen with exertion.    I took the liberty and referred her to her our ILD clinic at the [facility] for consideration of antifibrotic therapy.    She will return to see us in 2 to 4 weeks after starting the inhaled treprostinil therapy.  She will call us in the interim should any further worsening symptoms.    It was a pleasure meeting Ms. Soliman in our pulmonary hypertension clinic continues to [facility].  We thank you for involving us in her care.    Total time today was 90 minutes reviewing notes, imaging, labs, patient visit, orders and documentation    Sincerely,  Daniel Sanchez MD   [facility]  Heart Failure, Transplant, and Mechanical Circulatory Support Cardiology   Cardiovascular Division  [facility]   [phone]    CC  Patient Care Team:  Meg Kahn MD as PCP - General  Tereza Gee MD as Assigned Heart and Vascular Provider  Kelli Hager, RN as Specialty Care Coordinator (Cardiology)  Anupama Sunshine, RN as Specialty Care Coordinator (Cardiology)  Tiana Whitmore, LETA as Specialty Care Coordinator (Cardiology)  Saray Angel, RN as Specialty Care Coordinator (Cardiology)  TEREZA GEE

## 2022-08-15 NOTE — LETTER
8/15/2022      RE: Patty Soliman  [address]  [facility] [address]         Dear Dr. Wise,    We had the pleasure of seeing Ms. Soliman in our pulmonary hypertension clinic at [facility].  Although you are familiar with her history, please allow me to summarize it for the purpose of her records.    She is a pleasant 72-year-old female with exertional shortness of breath for the last 4 years it has been progressively getting worse.  She saw Dr. Annabella Saeed in April of this year for her with worsening exertional shortness of breath.  Work-up at the time revealed right ventricular dilatation and dysfunction.  Her estimated right ventricular systolic pressure was elevated.  Her left ventricle was normal in size and function.  Her Holter revealed Wenckebach phenomena.  She also had previous presyncopal symptoms.  In light of this she was referred to us for further evaluation and treatment of possible pulm hypertension.    Her main symptoms is exertional shortness of breath.  She can do her basic activities with supplemental oxygen.  She can climb 1 flight of stairs at a slow pace.  She cannot walk more than a block.  She has been having lightheadedness dizziness especially at nighttime when she gets up to go to the bathroom.  No exertional chest pain or chest pressure.  She has been having lower extremity swelling for which she is on diuretics.  She was recently started on supplemental oxygen therapy by her pulmonologist.  No recent hospitalization or ER visit.  She is currently functional class IIIb.  She does have chronic cough but no hemoptysis          Past Medical History:   Diagnosis Date     Cancer (H)      Diabetes mellitus (H)     Type 2 Diabetic     Hypertension        Past Surgical History:   Procedure Laterality Date     AR MASTECTOMY,PARTIAL,  WITH AXILLARY LYMPHADENECTOMY Right 4/18/2018    Procedure: Right Lumpectomy after Wire Localization; [place] Lymph Node Biopsy;   Surgeon: Katie Sahu MD;  Location: [facility];  Service: General     TUBAL LIGATION             Social History     Tobacco Use     Smoking status: Never Smoker     Smokeless tobacco: Never Used   Substance Use Topics     Alcohol use: Yes     Comment: Alcoholic Drinks/day: rarely     Family history  Nil significant    Current Outpatient Medications   Medication Sig     aspirin 325 MG tablet [ASPIRIN 325 MG TABLET] daily.     atorvastatin (LIPITOR) 10 MG tablet [ATORVASTATIN (LIPITOR) 10 MG TABLET] daily.     cholecalciferol, vitamin D3, 1,000 unit tablet [CHOLECALCIFEROL, VITAMIN D3, 1,000 UNIT TABLET] Take 2,000 Units by mouth daily.     coenzyme Q10 200 mg capsule [COENZYME Q10 200 MG CAPSULE] Take 200 mg by mouth daily.     dorzolamide-timolol (COSOPT) 22.3-6.8 mg/mL ophthalmic solution [DORZOLAMIDE-TIMOLOL (COSOPT) 22.3-6.8 MG/ML OPHTHALMIC SOLUTION] 1 gtt     fluticasone (FLONASE) 50 mcg/actuation nasal spray [FLUTICASONE (FLONASE) 50 MCG/ACTUATION NASAL SPRAY] as needed.     latanoprost (XALATAN) 0.005 % ophthalmic solution [LATANOPROST (XALATAN) 0.005 % OPHTHALMIC SOLUTION] Administer 1 drop to both eyes daily.      letrozole (FEMARA) 2.5 mg tablet [LETROZOLE (FEMARA) 2.5 MG TABLET] Take 1 tablet by mouth daily.     metFORMIN (GLUCOPHAGE) 500 MG tablet [METFORMIN (GLUCOPHAGE) 500 MG TABLET] Take 1,000 mg by mouth daily with breakfast.      metoprolol tartrate (LOPRESSOR) 25 MG tablet [METOPROLOL TARTRATE (LOPRESSOR) 25 MG TABLET] Take 25 mg by mouth daily.      omeprazole (PRILOSEC) 20 MG capsule [OMEPRAZOLE (PRILOSEC) 20 MG CAPSULE] Take 20 mg by mouth daily before breakfast.      triamterene-hydrochlorothiazide (DYAZIDE) 37.5-25 mg per capsule [TRIAMTERENE-HYDROCHLOROTHIAZIDE (DYAZIDE) 37.5-25 MG PER CAPSULE] Take by mouth daily.     LACTOBACILLUS COMBO NO.6 (PROBIOTIC COMPLEX ORAL) [LACTOBACILLUS COMBO NO.6 (PROBIOTIC COMPLEX ORAL)] 1     No current facility-administered medications for this  "visit.     ROS:   Constitutional: No fever, chills, or sweats. No weight gain/loss  ENT: No visual disturbance, ear ache, epistaxis, sore throat  Allergies/Immunologic: Negative   Respiratory: She does have a dry cough on and off.  Cardiovascular: As per HPI  GI: No nausea, vomiting, hematemesis, melena, or hematochezia   : No urinary frequency, dysuria, or hematuria  Integument: Negative  Psychiatric: Negative   Neuro: Negative  Endocrinology: Negative   Musculoskeletal: Negative    EXAM:   /83 (BP Location: Right arm, Patient Position: Chair, Cuff Size: Adult Regular)   Pulse 92   Ht 1.58 m (5' 2.21\")   Wt 59.4 kg (130 lb 14.4 oz)   SpO2 92%   BMI 23.78 kg/m     She was awake, alert, oriented x3.  She was comfortable.  She was in no apparent distress.  She was using 2 L of supplemental oxygen.  She had no pallor, cyanosis or jaundice.  Her neck exam revealed prominent jugular venous distention at 830 minutes of her manubrium sternal angle.  Carotids were 1+ bilaterally.  Her pulse was regular and rhythm.  Cardiac auscultation revealed normal S1 loud P2 no murmur rub or gallop.  Auscultation of her lungs revealed bilateral basilar end inspiratory crepitation.  No wheezing.  Her abdomen was soft with normal was no tenderness no rigidity no guarding.  She had no focal neurological deficit.    Labs:    CBC RESULTS:   Recent Labs   Lab Test 08/19/22  1540 08/19/22  1459 08/19/22  1306   WBC  --   --  11.3*   RBC  --   --  4.56   HGB 13.5   < > 13.8   HCT  --   --  42.7   MCV  --   --  94   MCH  --   --  30.3   MCHC  --   --  32.3   RDW  --   --  15.0   PLT  --   --  217    < > = values in this interval not displayed.       Recent Labs   Lab Test 08/19/22  1306 08/01/22  1033    137   POTASSIUM 3.7 3.6   CHLORIDE 97* 98   CO2 26 30   ANIONGAP 13 9   * 122*   BUN 24.3* 26   CR 1.30* 1.30*   GREGORY 10.0 10.1       Lab Results   Component Value Date    IRON 74 08/01/2022     Lab Results   Component " Value Date    STRE 4.7 (H) 08/01/2022     Lab Results   Component Value Date    NTBNPI 9,393 (H) 08/19/2022     Lab Results   Component Value Date    NTBNP 11,730 (H) 08/01/2022     Her serological work-up for pulmonary hypertension including antinuclear antibody rheumatoid factor HIV hepatitis and TSH were unremarkable..    Electrocardiogram (07/2022):  Sinus rhythm with left anterior fascicular block    Echocardiogram (08/2022):  Left ventricular function is normal.The ejection fraction is 60-65%.  Paradoxical septal motion consistent with right ventricular pressure and  volume overload is present.  Moderate to severe right ventricular dilation is present. The RV function is  moderately to severely reduced. The RVFAC is 30%, the TAPSE is 1.6 cm, and the  RV S' is 6 cm/s.  There is early shunting of saline on the bubble study. This is consistent with  an intracardiac shunt, likely due to a patent foramen ovale.  Moderate to severe tricuspid insufficiency is present.  The estimated PA systolic pressure is at least 92 mmHg.  There is mild dilation of the ascending aorta (3.7 cm, indexed value 2.31  cm/m2).  IVC diameter >2.1 cm collapsing <50% with sniff suggests a high RA pressure  estimated at 15 mmHg or greater.  No pericardial effusion is present.  There is no prior study for direct comparison.      RHC (08/2022)  Baseline Hemodynamics:  RA 9/16/9 mmHg  RV 65/11 mmHg  PA 64/20/37 mmHg  CWP 21/22/13 mmHg  CO (Terrance) 1.77 L/min  CI (Terrance) 1.11 L/min/m2  CO (TD) 2.45 L/min  CI (TD) 1.54 L/min/m2   mmHg   SVR 4,338 dynes (by measured Terrance)  PVR 13.56 GRAFF (by measured Terrance)  PVR 9.8 (by TD)    During Nitric Oxide:  RA 12/8/7 mmHg  PA 49/17/30 mmHg  CWP 4/4/4 mmHg  CO (TD) 3.4 L/min  CI (TD) 2.13 L/min/m2  Calculated cardiac output and index not included as the baseline hemodynamics were performed using measured Terrance.    mmHg   SVR 2,188 dynes  PVR 7.64 GRAFF    Shunt Run saturations:   SVC: 59%  IVC: 72%  (syringe in contact with room air prior to processing)   RA: 56%  RV: 55%  PA: 56%    Right sided filling pressures are mildly elevated. Left sided filling pressures are mildly elevated. Moderately elevated pulmonary artery hypertension. Reduced cardiac output level.    Pulmonary function test (August 2022)  Pulmonary function tests showed an FVC of 73% predicted, FEV1 of 81% predicted, FEV1 by FVC  of 78% predicted, TLC of 83% predicted, and DLCO of 36% predicted.    Dual-energy CT PE (August 2022)  Dual-energy CT pulmonary angiogram showed no evidence of chronic thromboembolic pulm hypertension.  Patient had evidence of usual interstitial pneumonitis.    6MWT (08/2022)  On her 6-minute walk test she walked 168 m only.  She required 4 L of supplemental oxygen to maintain a saturation of 90%.    Assessment and Plan:   72-year-old female with past medicine for diabetes hypertension and a recent diagnosis of interstitial lung disease.  She was referred to us for further evaluation management of pulmonary hypertension.    Impression  Pulmonary arterial hypertension out of proportion to interstitial lung disease  Moderate to severe RV dysfunction    She is currently functional class III.  Her NT proBNP is significantly elevated.  Her 6-minute walk distance is significantly reduced.  Her right ventricle size and function are moderate to severely affected on her echocardiogram.  Her cardiac catheterization showed significantly reduced cardiac index.  Her right-sided filling pressures are high normal.    I have recommended her to start inhaled treprostinil as a first line of therapy.  We will start her at 3 breaths and gradually increase it every third day as tolerated to maximum of 12 to 15 breaths 4 times a day.  We will start with the dry powdered inhaler as a first.  We will also start her on phosphodiesterase inhibitor in 2 to 4 weeks after starting inhaled treprostinil.  PDE 5 inhibitors have shown to improve right  ventricular size and function on posthoc analysis.  We will do dual combination therapy as her pulmonary hypertension and RV dysfunction are severe.  Her overall prognosis is poor with the limited survival.  She is not a candidate for lung transplantation because of her age.    We will also start her on digoxin for right ventricular inotropic support.  We will continue her on the current dose of diuretics.  She will continue to use 4 L of oxygen with exertion.    I took the liberty and referred her to her our ILD clinic at the [facility] for consideration of antifibrotic therapy.    She will return to see us in 2 to 4 weeks after starting the inhaled treprostinil therapy.  She will call us in the interim should any further worsening symptoms.    It was a pleasure meeting Ms. Soliman in our pulmonary hypertension clinic continues to [facility].  We thank you for involving us in her care.    Total time today was 90 minutes reviewing notes, imaging, labs, patient visit, orders and documentation    Sincerely,  Daniel Sanchez MD   [facility] Pulmonary [facility]pertension  Heart Failure, Transplant, and Mechanical Circulatory Support Cardiology   Cardiovascular Division  [facility]   [phone]               CC  Patient Care Team:  Meg Kahn MD as PCP - General  Tereza Gee MD as Assigned Heart and Vascular Provider  Kelli Hager, RN as Specialty Care Coordinator (Cardiology)  Anupama Sunshine, RN as Specialty Care Coordinator (Cardiology)  Tiana Whitmore, LETA as Specialty Care Coordinator (Cardiology)  Saray Angel, RN as Specialty Care Coordinator (Cardiology)  TEREZA GEE MD

## 2022-08-15 NOTE — NURSING NOTE
Patient educated to the following during visit and educated to contact RN or MD for any questions.     Plan:  RHC on 8/19- instructions given in clinic. Pt will take home covid test  Start PA for Tyvaso DPI- information given in clinic and PA team notified  Referral for pulmonary for ILD  Follow-up in 2 weeks after starting medication    Patient stated she understood all health information given and agreed to call with further questions or concerns.      Patient to be scheduled by RN after starting tyvaso    Kelli Hager RN

## 2022-08-15 NOTE — PATIENT INSTRUCTIONS
Medication Changes:   We will start a prior authorization for Tyvaso DPI    1. This medication needs to be approved by your insurance company and may take up to 2-3 weeks for approval. Therefore, you will not be able to pick it up from your Pharmacy today.  2. This medication can be expensive. If your co-pay is high, call us before you pick it up. Also, express financial need to your Pharmacy by stating (this medication is too expensive, I cannot afford this.)  3. Please call us when you start medication so we can schedule you for your follow-up appointments.    IF the co-pay is high, here are the assistance programs available to you (please still call us and let us know that it is high):     [employer] Assistance:    Patient Advocate Foundation (PAF):  371.549.8198  https://www.InternetVista.org/diseases/pulmonary-hypertension    Patient Access Network (PAN):  [phone]  https://panfoundation.org/index.php/en/patients/assistance-programs/pulmonary-hypertension    The Assistance Fund (TAF):  (797) 545-4289  https://Salman Enterprisess.org/program-listing/    Good Days:  613.522.9762  https://www.mygooddays.org/patients/diseases-covered/pulmonary-arterial-hypertension       Patient Assistance:      [employer] ASSIST Program (Remodulin, Tyvaso, Adcirca):  [phone]        Follow up Appointment Information:    Right heart catheterization on 8/19/22  Follow-up 2 weeks after starting Tyvaso    Check-In  Time Check-In Location Estimated Length Procedure   Name     1PM on 8/19/22   [facility]  waiting room 60-90 minutes Right Heart Catheterization**       *Mandatory COVID Testing: We require COVID testing prior to their procedure regardless of vaccination status.   This can be completed via PCR or Rapid (at lab or at home).  If you are doing an at home test please complete 1-2 days prior.  If you are completing a PCR Lab, this can be completed no sooner than 4 days prior.  If you are staying overnight a PCR  completed at a LAB is required and a Rapid will not be accepted.  To schedule COVID testing at a [facility], please call [phone] ([phone])  If completing the COVID Test at an Outside facility please have them fax the results to [phone]  Please bring in a picture of your results if a rapid is completed at home.  If you are running into and issues please call us.     You can order at home tests from:  CovidTests.gov  Or you can call: [place] Department of Health COVID-19 Public Hotline at 1-894.440.9441      Right Heart Catheterization    A Right Heart Cath is a test that measures how well your heart is pumping blood to your body and will assess the volume and pressures in your heart.   Time: Plan for up to 3 hours for procedure preparation, testing and after care.  Location: [facility], [address] [place] [address].   Check in: Gold Waiting Area. 1st floor entrance, directly down the christie.     A physician will come and talk with you about the procedure and obtain consent.  A nurse from the Cardiac Catheterization Lab will then escort you to the procedure area. You will receive a shot to numb the site where the catheter (tube) will enter your body.  This may be in the neck or groin - but likely your neck.  You will not receive sedation or anesthesia.   After the procedure you will recover for a short period and then be discharged.    Pre procedure instructions:     1.  Do not eat any solid food or milk products for 6 hours prior to the exam. You may drink clear liquids until 2 hours prior to the exam. Clear liquids include the following: water, Jell-O, clear broth, apple juice or any non-carbonated drink that you can see through (no soda).   2. Do not drink alcohol or smoke 24 hours prior to test.  3. Hold these meds:        Do not take your oral diabetic medication or short acting insulin the day of the procedure.      4. You may take your other  morning medications as prescribed with a sip of water. You may hold your diuretic that morning.   5. Please arrange for a ride to drop you off and pick you up in the instance you are unable to drive home, however you should be able to function as you normally would after the procedure  6. Take your temperature in the morning prior to coming in.  If your temperature is 100 F please call [phone] (Opt. 1) and notify them.  If you do not have access to a thermometer at home, please come in for testing.  If you are running a temperature your procedure may be rescheduled.  .torh    Results:   Latest Reference Range & Units 08/01/22 10:33   Sodium 133 - 144 mmol/L 137   Potassium 3.4 - 5.3 mmol/L 3.6   Chloride 94 - 109 mmol/L 98   Carbon Dioxide 20 - 32 mmol/L 30   Urea Nitrogen 7 - 30 mg/dL 26   Creatinine 0.52 - 1.04 mg/dL 1.30 (H)   GFR Estimate >60 mL/min/1.73m2 43 (L)   Calcium 8.5 - 10.1 mg/dL 10.1   Anion Gap 3 - 14 mmol/L 9   Albumin 3.4 - 5.0 g/dL 3.7   Protein Total 6.8 - 8.8 g/dL 7.8   Alkaline Phosphatase 40 - 150 U/L 152 (H)   ALT 0 - 50 U/L 24   AST 0 - 45 U/L 23   Bilirubin Direct 0.0 - 0.2 mg/dL 0.3 (H)   Bilirubin Total 0.2 - 1.3 mg/dL 0.8   Cholesterol <200 mg/dL 83   CRP Inflammation 0.0 - 8.0 mg/L 5.6   Patient Fasting > 8hrs?  No   HDL Cholesterol >=50 mg/dL 37 (L)   Interleukin 6 Blood <3.01 pg/mL 6.18 (H)   Iron 35 - 180 ug/dL 74   Iron Binding Cap 240 - 430 ug/dL 381   Iron Saturation Index 15 - 46 % 19   LDL Cholesterol Calculated <=100 mg/dL 19   Non HDL Cholesterol <130 mg/dL 46   N-Terminal Pro Bnp 0 - 900 pg/mL 11,730 (H)   Rheumatoid Factor <12 IU/mL 9   Soluble Transferrin Receptor 1.9 - 4.4 mg/L 4.7 (H)   Triglycerides <150 mg/dL 137   TSH 0.40 - 4.00 mU/L 1.89   Glucose 70 - 99 mg/dL 122 (H)   WBC 4.0 - 11.0 10e3/uL 12.3 (H)   Hemoglobin 11.7 - 15.7 g/dL 15.0   Hematocrit 35.0 - 47.0 % 46.8   Platelet Count 150 - 450 10e3/uL 165   RBC Count 3.80 - 5.20 10e6/uL 5.13   MCV 78 - 100 fL  91   MCH 26.5 - 33.0 pg 29.2   MCHC 31.5 - 36.5 g/dL 32.1   RDW 10.0 - 15.0 % 14.1   (H): Data is abnormally high  (L): Data is abnormally low      Thank you for allowing us to be a part of your care here at the [facility]    If you have questions or concerns please contact us at:    Kelli Hager RN, BSN    Jackelyn Golden (Schedule,Prior Auth)  Nurse Coordinator       Clinic   Pulmonary Hypertension     Pulmonary Hypertension  [facility]    [facility]  (Phone)[phone]      (Phone) [phone]  [phone] option 1     (Fax)[phone]              On Call Cardiologist for after hours or on weekends: [phone]    option #4    ** Please note that you will NOT receive a reminder call regarding your scheduled testing, reminder calls are for provider appointments only.  If you are scheduled for testing within the Room system you may receive a call regarding pre-registration for billing purposes only.**     Patient Instructions:  1. Continue staying active and eat a heart healthy diet.    2. Please keep current list of medications with you at all times.    3. Remember to weigh yourself daily after voiding and before you consume any food or beverages and log the numbers.  If you have gained 2 pounds overnight or 5 pounds in a week contact us immediately for medication adjustments or further instructions.    4. **Please call us immediately if you have any syncope (fainting or passing out), chest pain, edema (swelling or weight gain), or decline in your functional status (general decline in how you are feeling).    5. Patients on Remodulin (treprostinil) or Veletri (epoprostenol): Please make sure that you have your backup pump and supplies with you at all times, your mixing instructions, and contact information for your specialty pharmacy.      Support Group:  Pulmonary Hypertension  Association  Https://www.phassociation.org/  **Look at the Events Tab** They even have Support Groups that you can call into    [facility] Support Group  Second Saturday of the Month from 1-3 PM   [facility] [address]  Leader: Reina French  Phone: [phone]  Email: [email or web address]       .[email or web address]

## 2022-08-15 NOTE — NURSING NOTE
Chief Complaint   Patient presents with     New Patient      New PH referral from TEREZA GEE      Vitals were taken and medications reconciled.    Smith Sandhu, EMT  7:56 AM

## 2022-08-16 ENCOUNTER — TELEPHONE (OUTPATIENT)
Dept: CARDIOLOGY | Facility: CLINIC | Age: 72
End: 2022-08-16

## 2022-08-16 NOTE — TELEPHONE ENCOUNTER
PA Initiation    Medication: Tyvaso DPI Titration Kit 16 & 32 & 48MCG powder      Insurance Company: [employer] - Phone [phone] Fax [phone]  Pharmacy Filling the Rx: [id number]  Filling Pharmacy Phone:    Filling Pharmacy Fax:    Start Date: 8/16/2022  Key: [id number]        PA has been started but saved as a draft till notes are completed, then will send referral.

## 2022-08-16 NOTE — TELEPHONE ENCOUNTER
PA Initiation    Medication: Tyvaso DPI Titration Kit 112 x 16MCG &84 x 32MCG powder  Insurance Company: [employer] - Phone [phone] Fax [phone]  Pharmacy Filling the Rx: [facility] [address]  Filling Pharmacy Phone:    Filling Pharmacy Fax:    Start Date: 8/16/2022  Key: [id number]      Started PA but waiting on finished notes to continue and do referral at this time.

## 2022-08-18 ENCOUNTER — TELEPHONE (OUTPATIENT)
Dept: CARDIOLOGY | Facility: CLINIC | Age: 72
End: 2022-08-18

## 2022-08-18 NOTE — TELEPHONE ENCOUNTER
Left voicemail for patient to complete Travel Screen for Cardiac Cath Lab appointment on 8/19 and inform patient of updated Visitor Policy.       Informed pt on message of need for covid test

## 2022-08-19 ENCOUNTER — HOSPITAL ENCOUNTER (OUTPATIENT)
Facility: CLINIC | Age: 72
Discharge: HOME | End: 2022-08-19
Attending: INTERNAL MEDICINE | Admitting: INTERNAL MEDICINE
Payer: MEDICARE

## 2022-08-19 ENCOUNTER — APPOINTMENT (OUTPATIENT)
Dept: LAB | Facility: CLINIC | Age: 72
End: 2022-08-19
Attending: INTERNAL MEDICINE
Payer: MEDICARE

## 2022-08-19 ENCOUNTER — APPOINTMENT (OUTPATIENT)
Dept: MEDSURG UNIT | Facility: CLINIC | Age: 72
End: 2022-08-19
Attending: INTERNAL MEDICINE
Payer: MEDICARE

## 2022-08-19 VITALS
TEMPERATURE: 97.4 F | BODY MASS INDEX: 24.1 KG/M2 | DIASTOLIC BLOOD PRESSURE: 85 MMHG | WEIGHT: 130.95 LBS | HEART RATE: 89 BPM | RESPIRATION RATE: 16 BRPM | SYSTOLIC BLOOD PRESSURE: 121 MMHG | OXYGEN SATURATION: 98 % | HEIGHT: 62 IN

## 2022-08-19 DIAGNOSIS — I27.20 PULMONARY HYPERTENSION (H): ICD-10-CM

## 2022-08-19 LAB
ALBUMIN SERPL BCG-MCNC: 3.9 G/DL (ref 3.5–5.2)
ALP SERPL-CCNC: 152 U/L (ref 35–104)
ALT SERPL W P-5'-P-CCNC: 18 U/L (ref 10–35)
ANION GAP SERPL CALCULATED.3IONS-SCNC: 13 MMOL/L (ref 7–15)
AST SERPL W P-5'-P-CCNC: 29 U/L (ref 10–35)
BASOPHILS # BLD AUTO: 0 10E3/UL (ref 0–0.2)
BASOPHILS NFR BLD AUTO: 0 %
BILIRUB SERPL-MCNC: 1.1 MG/DL
BUN SERPL-MCNC: 24.3 MG/DL (ref 8–23)
CALCIUM SERPL-MCNC: 10 MG/DL (ref 8.8–10.2)
CHLORIDE SERPL-SCNC: 97 MMOL/L (ref 98–107)
CREAT SERPL-MCNC: 1.3 MG/DL (ref 0.51–0.95)
DEPRECATED HCO3 PLAS-SCNC: 26 MMOL/L (ref 22–29)
EOSINOPHIL # BLD AUTO: 0.3 10E3/UL (ref 0–0.7)
EOSINOPHIL NFR BLD AUTO: 2 %
ERYTHROCYTE [DISTWIDTH] IN BLOOD BY AUTOMATED COUNT: 15 % (ref 10–15)
GFR SERPL CREATININE-BSD FRML MDRD: 43 ML/MIN/1.73M2
GLUCOSE SERPL-MCNC: 139 MG/DL (ref 70–99)
HCT VFR BLD AUTO: 42.7 % (ref 35–47)
HGB BLD-MCNC: 12.5 G/DL (ref 11.7–15.7)
HGB BLD-MCNC: 12.9 G/DL (ref 11.7–15.7)
HGB BLD-MCNC: 13 G/DL (ref 11.7–15.7)
HGB BLD-MCNC: 13.1 G/DL (ref 11.7–15.7)
HGB BLD-MCNC: 13.2 G/DL (ref 11.7–15.7)
HGB BLD-MCNC: 13.4 G/DL (ref 11.7–15.7)
HGB BLD-MCNC: 13.5 G/DL (ref 11.7–15.7)
HGB BLD-MCNC: 13.5 G/DL (ref 11.7–15.7)
HGB BLD-MCNC: 13.8 G/DL (ref 11.7–15.7)
IMM GRANULOCYTES # BLD: 0.1 10E3/UL
IMM GRANULOCYTES NFR BLD: 0 %
LYMPHOCYTES # BLD AUTO: 2.7 10E3/UL (ref 0.8–5.3)
LYMPHOCYTES NFR BLD AUTO: 24 %
MCH RBC QN AUTO: 30.3 PG (ref 26.5–33)
MCHC RBC AUTO-ENTMCNC: 32.3 G/DL (ref 31.5–36.5)
MCV RBC AUTO: 94 FL (ref 78–100)
MONOCYTES # BLD AUTO: 0.9 10E3/UL (ref 0–1.3)
MONOCYTES NFR BLD AUTO: 8 %
NEUTROPHILS # BLD AUTO: 7.4 10E3/UL (ref 1.6–8.3)
NEUTROPHILS NFR BLD AUTO: 66 %
NRBC # BLD AUTO: 0 10E3/UL
NRBC BLD AUTO-RTO: 0 /100
NT-PROBNP SERPL-MCNC: 9393 PG/ML (ref 0–900)
OXYHGB MFR BLDV: 53 % (ref 92–100)
OXYHGB MFR BLDV: 53 % (ref 92–100)
OXYHGB MFR BLDV: 55 % (ref 92–100)
OXYHGB MFR BLDV: 56 % (ref 92–100)
OXYHGB MFR BLDV: 56 % (ref 92–100)
OXYHGB MFR BLDV: 59 % (ref 92–100)
OXYHGB MFR BLDV: 72 % (ref 92–100)
OXYHGB MFR BLDV: 72 % (ref 92–100)
PLATELET # BLD AUTO: 217 10E3/UL (ref 150–450)
POTASSIUM SERPL-SCNC: 3.7 MMOL/L (ref 3.4–5.3)
PROT SERPL-MCNC: 7.1 G/DL (ref 6.4–8.3)
RBC # BLD AUTO: 4.56 10E6/UL (ref 3.8–5.2)
SODIUM SERPL-SCNC: 136 MMOL/L (ref 136–145)
WBC # BLD AUTO: 11.3 10E3/UL (ref 4–11)

## 2022-08-19 PROCEDURE — 93451 RIGHT HEART CATH: CPT | Performed by: INTERNAL MEDICINE

## 2022-08-19 PROCEDURE — C1894 INTRO/SHEATH, NON-LASER: HCPCS | Performed by: INTERNAL MEDICINE

## 2022-08-19 PROCEDURE — 999N000132 HC STATISTIC PP CARE STAGE 1

## 2022-08-19 PROCEDURE — 272N000001 HC OR GENERAL SUPPLY STERILE: Performed by: INTERNAL MEDICINE

## 2022-08-19 PROCEDURE — 93451 RIGHT HEART CATH: CPT | Mod: 26 | Performed by: INTERNAL MEDICINE

## 2022-08-19 PROCEDURE — 82810 BLOOD GASES O2 SAT ONLY: CPT | Mod: 91

## 2022-08-19 PROCEDURE — 999N000142 HC STATISTIC PROCEDURE PREP ONLY

## 2022-08-19 PROCEDURE — 250N000009 HC RX 250: Performed by: INTERNAL MEDICINE

## 2022-08-19 PROCEDURE — 36415 COLL VENOUS BLD VENIPUNCTURE: CPT | Performed by: INTERNAL MEDICINE

## 2022-08-19 PROCEDURE — 83880 ASSAY OF NATRIURETIC PEPTIDE: CPT | Mod: GZ | Performed by: INTERNAL MEDICINE

## 2022-08-19 PROCEDURE — 85004 AUTOMATED DIFF WBC COUNT: CPT | Performed by: INTERNAL MEDICINE

## 2022-08-19 PROCEDURE — 93463 DRUG ADMIN & HEMODYNMIC MEAS: CPT | Performed by: INTERNAL MEDICINE

## 2022-08-19 PROCEDURE — 93463 DRUG ADMIN & HEMODYNMIC MEAS: CPT | Mod: GC | Performed by: INTERNAL MEDICINE

## 2022-08-19 PROCEDURE — 85018 HEMOGLOBIN: CPT

## 2022-08-19 PROCEDURE — 80053 COMPREHEN METABOLIC PANEL: CPT | Performed by: INTERNAL MEDICINE

## 2022-08-19 RX ORDER — LIDOCAINE 40 MG/G
CREAM TOPICAL
Status: COMPLETED | OUTPATIENT
Start: 2022-08-19 | End: 2022-08-19

## 2022-08-19 RX ADMIN — LIDOCAINE: 40 CREAM TOPICAL at 13:53

## 2022-08-19 ASSESSMENT — ACTIVITIES OF DAILY LIVING (ADL)
ADLS_ACUITY_SCORE: 35
ADLS_ACUITY_SCORE: 35

## 2022-08-19 NOTE — DISCHARGE INSTRUCTIONS
[facility]                        Interventional Cardiology  Discharge Instructions   Post Right Heart Cath      AFTER YOU GO HOME:  DO drink plenty of fluids  DO resume your regular diet and medications unless otherwise instructed by your Primary Physician  Do Not scrub the procedure site vigorously  No lotion or powder to the puncture site for 3 days    CALL YOUR PRIMARY PHYSICIAN IF: You may resume all normal activity.  Monitor neck site for bleeding, swelling, or voice changes. If you notice bleeding or swelling immediately apply pressure to the site and call number below to speak with Cardiology Fellow.  If you experience any changes in your breathing you should call your doctor immediately or come to the closest Emergency Department.  Do not drive yourself.    ADDITIONAL INSTRUCTIONS: Medications: You are to resume all home medications including anticoagulation therapy unless otherwise advised by your primary cardiologist or nurse coordinator.    Follow Up: Per your primary cardiology team    If you have any questions or concerns regarding your procedure site please call [phone] at anytime and ask for Cardiology Fellow on call.  They are available 24 hours a day.  You may also contact the Cardiology Clinic after hours number at [phone].                                                       Telephone Numbers [phone] Monday-Friday 8:00 am to 4:30 pm    [phone] After 4:30 pm Monday-Friday, Weekends & Holidays  Ask for Interventional Cardiologist on call. Someone is on call 24 hours/day   [facility] toll free number [phone] Monday-Friday 8:00 am to 4:30 pm   [facility] Emergency Dept [phone]

## 2022-08-19 NOTE — Clinical Note
dry, intact, no bleeding and no hematoma. 7fr sheath removed from RIJ. Manual pressure held, hemostasis obtained band aid applied

## 2022-08-19 NOTE — PROGRESS NOTES
Returned from cardiac cath lab s/p right heart catheterization.  VSS.  Denies pain.  Right neck site CDI, no swelling.  Dr. Stinson speaking with patient and her niece at bedside.  Reviewed discharge instructions with Patty and her niece.  Discharged to home with niece.

## 2022-08-21 NOTE — PROGRESS NOTES
Dear Dr. Wise,    We had the pleasure of seeing Ms. Soliman in our pulmonary hypertension clinic at [facility].  Although you are familiar with her history, please allow me to summarize it for the purpose of her records.    She is a pleasant 72-year-old female with exertional shortness of breath for the last 4 years it has been progressively getting worse.  She saw Dr. Annabella Saeed in April of this year for her with worsening exertional shortness of breath.  Work-up at the time revealed right ventricular dilatation and dysfunction.  Her estimated right ventricular systolic pressure was elevated.  Her left ventricle was normal in size and function.  Her Holter revealed Wenckebach phenomena.  She also had previous presyncopal symptoms.  In light of this she was referred to us for further evaluation and treatment of possible pulm hypertension.    Her main symptoms is exertional shortness of breath.  She can do her basic activities with supplemental oxygen.  She can climb 1 flight of stairs at a slow pace.  She cannot walk more than a block.  She has been having lightheadedness dizziness especially at nighttime when she gets up to go to the bathroom.  No exertional chest pain or chest pressure.  She has been having lower extremity swelling for which she is on diuretics.  She was recently started on supplemental oxygen therapy by her pulmonologist.  No recent hospitalization or ER visit.  She is currently functional class IIIb.  She does have chronic cough but no hemoptysis          Past Medical History:   Diagnosis Date     Cancer (H)      Diabetes mellitus (H)     Type 2 Diabetic     Hypertension        Past Surgical History:   Procedure Laterality Date     DC MASTECTOMY,PARTIAL,  WITH AXILLARY LYMPHADENECTOMY Right 4/18/2018    Procedure: Right Lumpectomy after Wire Localization; [place] Lymph Node Biopsy;  Surgeon: Katie Sahu MD;  [facility]: [facility];  Service: General      TUBAL LIGATION             Social History     Tobacco Use     Smoking status: Never Smoker     Smokeless tobacco: Never Used   Substance Use Topics     Alcohol use: Yes     Comment: Alcoholic Drinks/day: rarely     Family history  Nil significant    Current Outpatient Medications   Medication Sig     aspirin 325 MG tablet [ASPIRIN 325 MG TABLET] daily.     atorvastatin (LIPITOR) 10 MG tablet [ATORVASTATIN (LIPITOR) 10 MG TABLET] daily.     cholecalciferol, vitamin D3, 1,000 unit tablet [CHOLECALCIFEROL, VITAMIN D3, 1,000 UNIT TABLET] Take 2,000 Units by mouth daily.     coenzyme Q10 200 mg capsule [COENZYME Q10 200 MG CAPSULE] Take 200 mg by mouth daily.     dorzolamide-timolol (COSOPT) 22.3-6.8 mg/mL ophthalmic solution [DORZOLAMIDE-TIMOLOL (COSOPT) 22.3-6.8 MG/ML OPHTHALMIC SOLUTION] 1 gtt     fluticasone (FLONASE) 50 mcg/actuation nasal spray [FLUTICASONE (FLONASE) 50 MCG/ACTUATION NASAL SPRAY] as needed.     latanoprost (XALATAN) 0.005 % ophthalmic solution [LATANOPROST (XALATAN) 0.005 % OPHTHALMIC SOLUTION] Administer 1 drop to both eyes daily.      letrozole (FEMARA) 2.5 mg tablet [LETROZOLE (FEMARA) 2.5 MG TABLET] Take 1 tablet by mouth daily.     metFORMIN (GLUCOPHAGE) 500 MG tablet [METFORMIN (GLUCOPHAGE) 500 MG TABLET] Take 1,000 mg by mouth daily with breakfast.      metoprolol tartrate (LOPRESSOR) 25 MG tablet [METOPROLOL TARTRATE (LOPRESSOR) 25 MG TABLET] Take 25 mg by mouth daily.      omeprazole (PRILOSEC) 20 MG capsule [OMEPRAZOLE (PRILOSEC) 20 MG CAPSULE] Take 20 mg by mouth daily before breakfast.      triamterene-hydrochlorothiazide (DYAZIDE) 37.5-25 mg per capsule [TRIAMTERENE-HYDROCHLOROTHIAZIDE (DYAZIDE) 37.5-25 MG PER CAPSULE] Take by mouth daily.     LACTOBACILLUS COMBO NO.6 (PROBIOTIC COMPLEX ORAL) [LACTOBACILLUS COMBO NO.6 (PROBIOTIC COMPLEX ORAL)] 1     No current facility-administered medications for this visit.     ROS:   Constitutional: No fever, chills, or sweats. No weight  "gain/loss  ENT: No visual disturbance, ear ache, epistaxis, sore throat  Allergies/Immunologic: Negative   Respiratory: She does have a dry cough on and off.  Cardiovascular: As per HPI  GI: No nausea, vomiting, hematemesis, melena, or hematochezia   : No urinary frequency, dysuria, or hematuria  Integument: Negative  Psychiatric: Negative   Neuro: Negative  Endocrinology: Negative   Musculoskeletal: Negative    EXAM:   /83 (BP Location: Right arm, Patient Position: Chair, Cuff Size: Adult Regular)   Pulse 92   Ht 1.58 m (5' 2.21\")   Wt 59.4 kg (130 lb 14.4 oz)   SpO2 92%   BMI 23.78 kg/m     She was awake, alert, oriented x3.  She was comfortable.  She was in no apparent distress.  She was using 2 L of supplemental oxygen.  She had no pallor, cyanosis or jaundice.  Her neck exam revealed prominent jugular venous distention at 830 minutes of her manubrium sternal angle.  Carotids were 1+ bilaterally.  Her pulse was regular and rhythm.  Cardiac auscultation revealed normal S1 loud P2 no murmur rub or gallop.  Auscultation of her lungs revealed bilateral basilar end inspiratory crepitation.  No wheezing.  Her abdomen was soft with normal was no tenderness no rigidity no guarding.  She had no focal neurological deficit.    Labs:    CBC RESULTS:   Recent Labs   Lab Test 08/19/22  1540 08/19/22  1459 08/19/22  1306   WBC  --   --  11.3*   RBC  --   --  4.56   HGB 13.5   < > 13.8   HCT  --   --  42.7   MCV  --   --  94   MCH  --   --  30.3   MCHC  --   --  32.3   RDW  --   --  15.0   PLT  --   --  217    < > = values in this interval not displayed.       Recent Labs   Lab Test 08/19/22  1306 08/01/22  1033    137   POTASSIUM 3.7 3.6   CHLORIDE 97* 98   CO2 26 30   ANIONGAP 13 9   * 122*   BUN 24.3* 26   CR 1.30* 1.30*   GREGORY 10.0 10.1       Lab Results   Component Value Date    IRON 74 08/01/2022     Lab Results   Component Value Date    STRE 4.7 (H) 08/01/2022     Lab Results   Component Value " Date    NTBNPI 9,393 (H) 08/19/2022     Lab Results   Component Value Date    NTBNP 11,730 (H) 08/01/2022     Her serological work-up for pulmonary hypertension including antinuclear antibody rheumatoid factor HIV hepatitis and TSH were unremarkable..    Electrocardiogram (07/2022):  Sinus rhythm with left anterior fascicular block    Echocardiogram (08/2022):  Left ventricular function is normal.The ejection fraction is 60-65%.  Paradoxical septal motion consistent with right ventricular pressure and  volume overload is present.  Moderate to severe right ventricular dilation is present. The RV function is  moderately to severely reduced. The RVFAC is 30%, the TAPSE is 1.6 cm, and the  RV S' is 6 cm/s.  There is early shunting of saline on the bubble study. This is consistent with  an intracardiac shunt, likely due to a patent foramen ovale.  Moderate to severe tricuspid insufficiency is present.  The estimated PA systolic pressure is at least 92 mmHg.  There is mild dilation of the ascending aorta (3.7 cm, indexed value 2.31  cm/m2).  IVC diameter >2.1 cm collapsing <50% with sniff suggests a high RA pressure  estimated at 15 mmHg or greater.  No pericardial effusion is present.  There is no prior study for direct comparison.      RHC (08/2022)  Baseline Hemodynamics:  RA 9/16/9 mmHg  RV 65/11 mmHg  PA 64/20/37 mmHg  CWP 21/22/13 mmHg  CO (Terrance) 1.77 L/min  CI (Terrance) 1.11 L/min/m2  CO (TD) 2.45 L/min  CI (TD) 1.54 L/min/m2   mmHg   SVR 4,338 dynes (by measured Terrance)  PVR 13.56 GRAFF (by measured Terrance)  PVR 9.8 (by TD)    During Nitric Oxide:  RA 12/8/7 mmHg  PA 49/17/30 mmHg  CWP 4/4/4 mmHg  CO (TD) 3.4 L/min  CI (TD) 2.13 L/min/m2  Calculated cardiac output and index not included as the baseline hemodynamics were performed using measured Terrance.    mmHg   SVR 2,188 dynes  PVR 7.64 GRAFF    [place] saturations:   SVC: 59%  IVC: 72% (syringe in contact with room air prior to processing)   RA: 56%  RV:  55%  PA: 56%    Right sided filling pressures are mildly elevated. Left sided filling pressures are mildly elevated. Moderately elevated pulmonary artery hypertension. Reduced cardiac output level.    Pulmonary function test (August 2022)  Pulmonary function tests showed an FVC of 73% predicted, FEV1 of 81% predicted, FEV1 by FVC  of 78% predicted, TLC of 83% predicted, and DLCO of 36% predicted.    Dual-energy CT PE (August 2022)  Dual-energy CT pulmonary angiogram showed no evidence of chronic thromboembolic pulm hypertension.  Patient had evidence of usual interstitial pneumonitis.    6MWT (08/2022)  On her 6-minute walk test she walked 168 m only.  She required 4 L of supplemental oxygen to maintain a saturation of 90%.    Assessment and Plan:   72-year-old female with past medicine for diabetes hypertension and a recent diagnosis of interstitial lung disease.  She was referred to us for further evaluation management of pulmonary hypertension.    Impression  Pulmonary arterial hypertension out of proportion to interstitial lung disease  Moderate to severe RV dysfunction    She is currently functional class III.  Her NT proBNP is significantly elevated.  Her 6-minute walk distance is significantly reduced.  Her right ventricle size and function are moderate to severely affected on her echocardiogram.  Her cardiac catheterization showed significantly reduced cardiac index.  Her right-sided filling pressures are high normal.    I have recommended her to start inhaled treprostinil as a first line of therapy.  We will start her at 3 breaths and gradually increase it every third day as tolerated to maximum of 12 to 15 breaths 4 times a day.  We will start with the dry powdered inhaler as a first.  We will also start her on phosphodiesterase inhibitor in 2 to 4 weeks after starting inhaled treprostinil.  PDE 5 inhibitors have shown to improve right ventricular size and function on posthoc analysis.  We will do dual  combination therapy as her pulmonary hypertension and RV dysfunction are severe.  Her overall prognosis is poor with the limited survival.  She is not a candidate for lung transplantation because of her age.    We will also start her on digoxin for right ventricular inotropic support.  We will continue her on the current dose of diuretics.  She will continue to use 4 L of oxygen with exertion.    I took the liberty and referred her to her our ILD clinic at the [facility] for consideration of antifibrotic therapy.    She will return to see us in 2 to 4 weeks after starting the inhaled treprostinil therapy.  She will call us in the interim should any further worsening symptoms.    It was a pleasure meeting Ms. Soliman in our pulmonary hypertension clinic continues to [facility].  We thank you for involving us in her care.    Total time today was 90 minutes reviewing notes, imaging, labs, patient visit, orders and documentation    Sincerely,  Daniel Sanchez MD   [facility]  Heart Failure, Transplant, and Mechanical Circulatory Support Cardiology   Cardiovascular Division  [facility]   [phone]               CC  Patient Care Team:  Meg Kahn MD as PCP - General  Tereza Gee MD as Assigned Heart and Vascular Provider  Kelli Hager, RN as Specialty Care Coordinator (Cardiology)  Anupama Sunshine, RN as Specialty Care Coordinator (Cardiology)  Tiana Whitmore, LETA as Specialty Care Coordinator (Cardiology)  Saray Angel, RN as Specialty Care Coordinator (Cardiology)  TEREZA GEE

## 2022-08-22 NOTE — TELEPHONE ENCOUNTER
Submitted PA now that the Right Heart Cath is done        Jackelyn Golden  Clinic    Pulmonary Hypertension   [facility]  (P) [phone]

## 2022-08-22 NOTE — TELEPHONE ENCOUNTER
Submitted PA           Jackelyn Golden  Clinic    Pulmonary Hypertension   [facility]  (P) [phone]

## 2022-08-23 DIAGNOSIS — I10 HTN (HYPERTENSION): Primary | ICD-10-CM

## 2022-08-23 DIAGNOSIS — I27.21 PAH (PULMONARY ARTERY HYPERTENSION) (H): ICD-10-CM

## 2022-08-23 RX ORDER — DIGOXIN 125 MCG
125 TABLET ORAL DAILY
Qty: 90 TABLET | Refills: 3 | Status: SHIPPED | OUTPATIENT
Start: 2022-08-23 | End: 2022-09-13 | Stop reason: ALTCHOICE

## 2022-08-23 NOTE — TELEPHONE ENCOUNTER
PA denied.  Reason given:      Hasn't tried 2 other drugs  Patient notified:            Jackelyn Golden  Clinic    Pulmonary Hypertension   [facility]  (p) [phone]

## 2022-08-23 NOTE — PROGRESS NOTES
Date: 8/23/2022    Time of Call: 11:43 AM     Diagnosis:  HTN     [ VORB ] Ordering provider: Dr. Sanchez  Order: Start on Digoxin 125mcg daily, Referral for ILD pulmonary to establish care. Start Pa for Tyvaso DPI dt PAH out of proportion to ILD, RTC in 4 weeks after starting Tyvaso     Order received by: Kelli Hager RN       Follow-up/additional notes: Called patient to update plan. Pt verbalized understanding

## 2022-08-23 NOTE — TELEPHONE ENCOUNTER
PA denied.  Reason given:                 Jackelyn Golden  Clinic    Pulmonary Hypertension   [facility]  (P) [phone]

## 2022-08-25 NOTE — TELEPHONE ENCOUNTER
Appeal was started. Faxed in a Appeal request form along with Tyvaso DPI appeal letter and the CT scan         Jackelyn Golden  [facility]    Pulmonary Hypertension   [facility]  (p) [phone]

## 2022-08-25 NOTE — TELEPHONE ENCOUNTER
Appeal was started. Faxed in a Appeal request form along with Tyvaso DPI appeal letter and the CT scan         Jackelyn Golden  [facility]    [facility]   [facility]  (p) [phone]

## 2022-08-29 NOTE — TELEPHONE ENCOUNTER
Called [facility] and they stated they never received appeal letter for this dosage but the other kit was approved. I re faxed this in requesting it to be for this titration kit with appeal letter.     Jackelyn Golden  Clinic    Pulmonary Hypertension   [facility]  (P) [phone]

## 2022-08-29 NOTE — TELEPHONE ENCOUNTER
Called [facility] about the determination and this kit is approved. Will be sending out approval letter today. Case # [id number]. 8/22/22-12/31/39      Jackelyn Golden  Clinic    Pulmonary Hypertension   [facility]  (p) [phone]

## 2022-09-07 NOTE — TELEPHONE ENCOUNTER
Called and they stated that this was dismissed due to the other kit being approved. She did a test claim for the   Tyvaso DPI Titration Kit 16 & 32 & 48MCG powder and it went through and also the 64mcg. So this PA is not needed. Did email [facility] to see the status on this patient    Jackelyn Golden  [facility]    Pulmonary Hypertension   [facility]  (P) [phone]

## 2022-09-09 ENCOUNTER — MYC MEDICAL ADVICE (OUTPATIENT)
Dept: CARDIOLOGY | Facility: CLINIC | Age: 72
End: 2022-09-09

## 2022-09-12 NOTE — TELEPHONE ENCOUNTER
Called patient and is reported low HR and dizziness at night when she wakes up. Discussed to update MD regarding.     Kelli Hager RN on 9/12/2022 at 12:52 PM

## 2022-09-13 ENCOUNTER — TELEPHONE (OUTPATIENT)
Dept: CARDIOLOGY | Facility: CLINIC | Age: 72
End: 2022-09-13

## 2022-09-13 DIAGNOSIS — R42 DIZZINESS AND GIDDINESS: ICD-10-CM

## 2022-09-13 DIAGNOSIS — Z51.81 ENCOUNTER FOR THERAPEUTIC DRUG LEVEL MONITORING: Primary | ICD-10-CM

## 2022-09-13 NOTE — TELEPHONE ENCOUNTER
PT reports low heart rates at night and dizziness after starting digoxin. Reports HR 50-60's. VORB to stop digoxin per Dr. Sanchez. PT verbalized understanding.    Kelli Hager RN on 9/13/2022 at 4:25 PM

## 2022-09-20 NOTE — TELEPHONE ENCOUNTER
Pt reports dizziness has subsided some since discontinued digoxin at this time. Does report low HR and dizziness occasionally at HS.     Kelli Hager RN on 9/20/2022 at 2:11 PM

## 2022-09-25 ENCOUNTER — HEALTH MAINTENANCE LETTER (OUTPATIENT)
Age: 72
End: 2022-09-25

## 2022-10-03 ENCOUNTER — TELEPHONE (OUTPATIENT)
Dept: CARDIOLOGY | Facility: CLINIC | Age: 72
End: 2022-10-03

## 2022-10-03 NOTE — TELEPHONE ENCOUNTER
Pt reports starting on Tyvaso on 9/29/22. Is having some coughing, dry throat and slight swelling to BLE. Discussed to increase sips of water, coat throat with peanut butter with inhalation, use of throat lozenges and elevation of feet. Discussed to call with any increased SOB or worsening edema. Pt verbalized understanding. Pt to call with update on times that work for a 4 week follow-up   .    Kelli Hager RN on 10/3/2022 at 4:58 PM

## 2022-10-19 ENCOUNTER — TELEPHONE (OUTPATIENT)
Dept: CARDIOLOGY | Facility: CLINIC | Age: 72
End: 2022-10-19

## 2022-10-19 DIAGNOSIS — R60.9 EDEMA: Primary | ICD-10-CM

## 2022-10-19 RX ORDER — FUROSEMIDE 20 MG
20 TABLET ORAL DAILY
Qty: 90 TABLET | Refills: 3 | Status: SHIPPED | OUTPATIENT
Start: 2022-10-19 | End: 2023-09-20

## 2022-10-19 NOTE — TELEPHONE ENCOUNTER
Patient reports swelling has decreased and has only been taking furosemide EOD. Reports her sleeping has improved as well. Has a follow-up on 11/3.   Kelli Hager RN on 10/26/2022 at 9:38 AM      -------------------------------------  Patient is reporting increased edema to BLE since starting on Tyvaso DPI. Denies increased SOB at this time.     Date: 10/19/2022    Time of Call: 2:19 PM     Diagnosis:  Edema     [ VORB ] Ordering provider: Dr. Sanchez  Order: furosemide 20mg daily     Order received by: Kelli Hager RN       Follow-up/additional notes: Called patient to notify and prescription sent to [facility] pharmacy.    Kelli Hager RN on 10/19/2022 at 2:20 PM

## 2022-11-03 ENCOUNTER — VIRTUAL VISIT (OUTPATIENT)
Dept: CARDIOLOGY | Facility: CLINIC | Age: 72
End: 2022-11-03
Attending: PHYSICIAN ASSISTANT
Payer: MEDICARE

## 2022-11-03 DIAGNOSIS — R06.09 DOE (DYSPNEA ON EXERTION): ICD-10-CM

## 2022-11-03 DIAGNOSIS — I27.21 PAH (PULMONARY ARTERY HYPERTENSION) (H): Primary | ICD-10-CM

## 2022-11-03 PROCEDURE — 99214 OFFICE O/P EST MOD 30 MIN: CPT | Mod: 95 | Performed by: PHYSICIAN ASSISTANT

## 2022-11-03 RX ORDER — SILDENAFIL CITRATE 20 MG/1
20 TABLET ORAL 3 TIMES DAILY
COMMUNITY
End: 2022-11-09

## 2022-11-03 ASSESSMENT — ENCOUNTER SYMPTOMS
ORTHOPNEA: 1
EXERCISE INTOLERANCE: 1
SYNCOPE: 0
WHEEZING: 0
SINUS CONGESTION: 1
HEMOPTYSIS: 0
DYSPNEA ON EXERTION: 1
FATIGUE: 1
LEG PAIN: 0
SLEEP DISTURBANCES DUE TO BREATHING: 0
SHORTNESS OF BREATH: 1
HYPERTENSION: 0
HYPOTENSION: 0
POSTURAL DYSPNEA: 1
COUGH: 1
SPUTUM PRODUCTION: 0
LIGHT-HEADEDNESS: 0
SNORES LOUDLY: 0
COUGH DISTURBING SLEEP: 1
PALPITATIONS: 0

## 2022-11-03 NOTE — PROGRESS NOTES
"Patty is a 72 year old who is being evaluated via a billable video visit.      Vitals - Patient Reported  Weight (Patient Reported): 56.7 kg (125 lb)  Height (Patient Reported): 157.5 cm (5' 2\")  BMI (Based on Pt Reported Ht/Wt): 22.86  Pain Score: No Pain (0)      How would you like to obtain your AVS? MyChart  If the video visit is dropped, the invitation should be resent by: Text to cell phone: [phone]  Will anyone else be joining your video visit? No   Patient states is currently in the [facility]: Yes        Distant Location (provider location):  On-site    Platform used for Video Visit: [facility]            CARDIOLOGY  CLINIC VIDEO VISIT    Date of video visit: 11/03/22        Patty Soliman is a 72 year old female who is being evaluated via a billable video visit.        I have reviewed and updated the patient's Past Medical History, Social History, Family History and Medication List.    MEDICATIONS:  Current Outpatient Medications   Medication Sig Dispense Refill     sildenafil (REVATIO) 20 MG tablet Take 1 tablet (20 mg) by mouth 3 times daily PA in Process, RX to be sent upon approval       aspirin 325 MG tablet [ASPIRIN 325 MG TABLET] daily.       atorvastatin (LIPITOR) 10 MG tablet [ATORVASTATIN (LIPITOR) 10 MG TABLET] daily.       cholecalciferol, vitamin D3, 1,000 unit tablet [CHOLECALCIFEROL, VITAMIN D3, 1,000 UNIT TABLET] Take 2,000 Units by mouth daily.       coenzyme Q10 200 mg capsule [COENZYME Q10 200 MG CAPSULE] Take 200 mg by mouth daily.       dorzolamide-timolol (COSOPT) 22.3-6.8 mg/mL ophthalmic solution [DORZOLAMIDE-TIMOLOL (COSOPT) 22.3-6.8 MG/ML OPHTHALMIC SOLUTION] 1 gtt       fluticasone (FLONASE) 50 mcg/actuation nasal spray [FLUTICASONE (FLONASE) 50 MCG/ACTUATION NASAL SPRAY] as needed.       furosemide (LASIX) 20 MG tablet Take 1 tablet (20 mg) by mouth daily 90 tablet 3     LACTOBACILLUS COMBO NO.6 (PROBIOTIC COMPLEX ORAL) [LACTOBACILLUS COMBO NO.6 (PROBIOTIC COMPLEX " ORAL)] 1       latanoprost (XALATAN) 0.005 % ophthalmic solution [LATANOPROST (XALATAN) 0.005 % OPHTHALMIC SOLUTION] Administer 1 drop to both eyes daily.        letrozole (FEMARA) 2.5 mg tablet [LETROZOLE (FEMARA) 2.5 MG TABLET] Take 1 tablet by mouth daily.       metFORMIN (GLUCOPHAGE) 500 MG tablet [METFORMIN (GLUCOPHAGE) 500 MG TABLET] Take 1,000 mg by mouth daily with breakfast.        metoprolol tartrate (LOPRESSOR) 25 MG tablet [METOPROLOL TARTRATE (LOPRESSOR) 25 MG TABLET] Take 25 mg by mouth daily.        omeprazole (PRILOSEC) 20 MG capsule [OMEPRAZOLE (PRILOSEC) 20 MG CAPSULE] Take 20 mg by mouth daily before breakfast.        Treprostinil 64 MCG POWD Inhale 64 mcg into the lungs 4 times daily       triamterene-hydrochlorothiazide (DYAZIDE) 37.5-25 mg per capsule [TRIAMTERENE-HYDROCHLOROTHIAZIDE (DYAZIDE) 37.5-25 MG PER CAPSULE] Take by mouth daily.         ALLERGIES  Escitalopram oxalate [escitalopram], Sulfa (sulfonamide antibiotics) [sulfa drugs], and Sulfamethoxazole-trimethoprim [sulfamethoxazole w/trimethoprim]      Brief physical exam:  General: In no acute distress, upright and calm.  Eyes: No apparent redness or discharge.   Chest: No labored breathing, no cough during exam or audible wheezing.   Neuro: No obvious focal defects or tremors.   Psych: Alert and oriented. Does not appear anxious.     The rest of a comprehensive physical examination is deferred due to public health emergency video visit restrictions.       Primary PH cardiologist: Dr. Sanchez      HPI:  Ms. Soliman is a pleasant 72 year old female with a PMHx including DM, HTN, dyslipidemia, CKD, and progressive BOYER. Workup revealed RV dilation and RV dysfunction via echocardiogram. 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 Additionally, this summer she was diagnosed with pulmonary fibrosis. She was then referred to see Dr. Sanchez in August, and underwent a RHC which showed moderate PAH with  reduced cardiac output. At that time, it was recommended she start inhaled Tyvaso DPI, and was started on digoxin for RV support. Plan was to also add sildenafil down the road. Additionally, she was referred to our ILD clinic.     Today, we are following up virtually. She is now on Tyvaso 64mcg QID and tells me that thus far she is tolerating well. She thinks it may be helping slightly and she appears to be having minimal side effects. She does have a cough, but this is chronic and feels as if it is actually slightly better. She had a single episode of severe dizziness while ambulating in from her garage to the point where she had to hold onto the counter and actually had some incontinence. Also notably, a few weeks ago, she sent a message reporting low heart rates at night when she wakes up SOB at times. She also relays having low HRs at night on a previous cardiac monitor this summer. At that point her digoxin was discontinued. However, today, a family member Summer who accompanies her today, relays that she rarely sees her with heart rates under 110 at home, even at rest. She is wearing her oxygen at 5L currently and reports sats in the low 90s at rest, but will desat easily to 70s with activity. Upon questioning, however, she thinks she recovers more quickly now on Tyvaso.     The patient did not have any new labs performed for our visit today, but most recent available labs were reviewed as below.       CURRENT PULMONARY HYPERTENSION REGIMEN:    PAH Rx: Tyvaso 64mcg DPI QID    Diuretics: Lasix 20mg every other day    Oxygen: 5LNC     Anticoagulation: None    Pulm: Dr. Lunsford ([facility])    Assessment/Plan:    1. Pulmonary hypertension.   --Ms. Soliman has moderate to severe PAH with evidence of RV dysfunction. She is now on Tyvaso DPI 64mcg QID and tolerating well and noting some mild clinical improvement in her breathing and how quickly her sats recover with activity. As planned, will proceed with combination  therapy by adding sildenafil. Will submit PA today.    --Unfortunately, her REVEAL risk score is high for adverse outcomes at 12. She is not a candidate for lung transplantation because of her age.   --She was initially placed on digoxin for RV support, but this had to be stopped due to bradycardia/dizziness. However, today she reports more tachycardia. When we see her back in follow up, will try to sort this out further, with an EKG and potentially repeat cardiac monitor.    --As today is a virtual visit I cannot fully assess her volume status. However, she reports that she initially developed more swelling but this has improved with the addition of lasix 20mg every other day. She is also on a triam/hctz combo pill for her BP. Also when she returns, will check repeat labs and BP, and see if potentially her antihypertensive medication can/should be discontinued.    --She follows with pulmonary via the [employer] system, but has been referred for further evaluation at the ILD clinic here at the [place]. She is still awaiting an appt. Will message their team for follow up. For now, continue supplemental oxygen as is.       Follow up plan: Return to see me in person in clinic in the next few weeks, after we start sildenafil, with repeat labs.       Testing/labs:    Most recent labs:    Latest Reference Range & Units 08/19/22 13:06   Sodium 136 - 145 mmol/L 136   Potassium 3.4 - 5.3 mmol/L 3.7   Chloride 98 - 107 mmol/L 97 (L)   Carbon Dioxide (CO2) 22 - 29 mmol/L 26   Urea Nitrogen 8.0 - 23.0 mg/dL 24.3 (H)   Creatinine 0.51 - 0.95 mg/dL 1.30 (H)   GFR Estimate >60 mL/min/1.73m2 43 (L)   Calcium 8.8 - 10.2 mg/dL 10.0   Anion Gap 7 - 15 mmol/L 13   Albumin 3.5 - 5.2 g/dL 3.9   Protein Total 6.4 - 8.3 g/dL 7.1   Alkaline Phosphatase 35 - 104 U/L 152 (H)   ALT 10 - 35 U/L 18   AST 10 - 35 U/L 29   Bilirubin Total <=1.2 mg/dL 1.1   Glucose 70 - 99 mg/dL 139 (H)   (L): Data is abnormally low  (H): Data is abnormally  high      Other recent pertinent testing:    Echo 8/1/22  Interpretation Summary  Left ventricular function is normal.The ejection fraction is 60-65%.  Paradoxical septal motion consistent with right ventricular pressure and  volume overload is present.  Moderate to severe right ventricular dilation is present. The RV function is  moderately to severely reduced. The RVFAC is 30%, the TAPSE is 1.6 cm, and the  RV S' is 6 cm/s.  There is early shunting of saline on the bubble study. This is consistent with  an intracardiac shunt, likely due to a patent foramen ovale.  Moderate to severe tricuspid insufficiency is present.  The estimated PA systolic pressure is at least 92 mmHg.  There is mild dilation of the ascending aorta (3.7 cm, indexed value 2.31  cm/m2).  IVC diameter >2.1 cm collapsing <50% with sniff suggests a high RA pressure  estimated at 15 mmHg or greater.  No pericardial effusion is present.  There is no prior study for direct comparison.    RHC 8/19/22  Hemodynamics    Baseline Hemodynamics:  RA 9/16/9 mmHg  RV 65/11 mmHg  PA 64/20/37 mmHg  CWP 21/22/13 mmHg  CO (Terrance) 1.77 L/min  CI (Terrance) 1.11 L/min/m2  CO (TD) 2.45 L/min  CI (TD) 1.54 L/min/m2   mmHg   SVR 4,338 dynes (by measured Terrance)  PVR 13.56 GRAFF (by measured Terrance)  PVR 9.8 (by TD)    During Nitric Oxide:  RA 12/8/7 mmHg  PA 49/17/30 mmHg  CWP 4/4/4 mmHg  CO (TD) 3.4 L/min  CI (TD) 2.13 L/min/m2  Calculated cardiac output and index not included as the baseline hemodynamics were performed using measured Terrance.    mmHg   SVR 2,188 dynes  PVR 7.64 GRAFF    [place]unt Run saturations:   SVC: 59%  IVC: 72% (syringe in contact with room air prior to processing)   RA: 56%  RV: 55%  PA: 56%     Right sided filling pressures are mildly elevated. Left sided filling pressures are mildly elevated. Moderately elevated pulmonary artery hypertension. Reduced cardiac output level.         NYHA Functional Class:  3      Video-Visit Details    Type of  service:  Video Visit    Video Start Time: 1525  Video End Time: 1545    An additional 15 minutes was spent today performing chart and history review, pre and post visit documentation, and care coordination.    Distant Location (provider location):  On-site    Originating Location (pt. Location): Home    Distant Location (provider location):  [facility]    Platform used for Video Visit: Kobe Duke PA-C  [facility]  Pager [phone]    Answers for HPI/ROS submitted by the patient on 11/3/2022  General Symptoms: Yes  Skin Symptoms: No  HENT Symptoms: Yes  EYE SYMPTOMS: No  HEART SYMPTOMS: Yes  LUNG SYMPTOMS: Yes  INTESTINAL SYMPTOMS: No  URINARY SYMPTOMS: No  GYNECOLOGIC SYMPTOMS: No  BREAST SYMPTOMS: No  SKELETAL SYMPTOMS: No  BLOOD SYMPTOMS: No  NERVOUS SYSTEM SYMPTOMS: No  MENTAL HEALTH SYMPTOMS: No  Congestion: Yes  Fatigue: Yes  Chest pain or pressure: No  Fast or irregular heartbeat: No  Pain in legs with walking: No  Trouble breathing while lying down: Yes  Fingers or toes appear blue: Yes  High blood pressure: No  Low blood pressure: No  Fainting: No  Murmurs: No  Pacemaker: No  Varicose veins: No  Edema or swelling: Yes  Wake up at night with shortness of breath: No  Light-headedness: No  Exercise intolerance: Yes  Cough: Yes  Sputum or phlegm: No  Coughing up blood: No  Difficulty breating or shortness of breath: Yes  Snoring: No  Wheezing: No  Difficulty breathing on exertion: Yes  Nighttime Cough: Yes  Difficulty breathing when lying flat: Yes

## 2022-11-03 NOTE — PATIENT INSTRUCTIONS
Thank you for visiting the Pulmonary Hypertension Clinic virtually today.      Today we discussed:   Continue all medications as you are.  We will work on authorization for new medication sildenafil and reach out to you with an update next week.    We will also reach out to the lung team here at the [facility] to make sure you are on their list for an appointment.       Follow up Appointment Information:  Return to see Janet Duke PA-C in clinic after you receive and start your sildenafil. We will arrange this appt based on the start time of the new medication. We will check some bloodwork at that visit as well.       Additional Instructions:    1. Continue staying active and eat a heart healthy, low sodium diet.     2. Please keep current list of medications with you at all times.     3. Remember to weigh yourself daily after voiding and write it down on a log. If you have gained/lost 2 pounds overnight or 5 pounds in a week contact us for medication adjustments or further instructions.    4. Please call us immediately if you have syncope (fainting or passing out), chest pain, worsening edema (swelling or weight gain), or general worsening in how you are feeling.       -----------------------------------------------------------------------------------------------------------------    If you have questions or concerns please contact us at:    Kelli Hager RN, BSN   Jackelyn Golden (Schedule,Prior Auth)  Nurse Coordinator      Clinic   Pulmonary Hypertension    Pulmonary Hypertension  [facility]              [facility]  (P)[phone]     (P) [phone]         (F)[phone]                ** Please note that you will NOT receive a reminder call regarding your scheduled testing, reminder calls are for provider appointments only.  If you are scheduled for testing within the [facility] system you may receive a call regarding  pre-registration for billing purposes only.**     --------------------------------------------------------------------------------------------------------------    Interested in joining a support group?    Pulmonary Hypertension Association  [email or web address].phassociation.org/  **Look at the Events Tab** They even have Support Groups that you can call into    [facility] Support Group  Second Saturday of the Month from 1-3 PM   Location: [address] [place] [id number] (Currently Virtual)  Leader: Reina French   Phone: [phone]   Email: [email or web address]     Great Videos about Pulmonary Hypertension!!  Scan ME!    Website: [email or web address]

## 2022-11-03 NOTE — LETTER
11/3/2022      RE: Patty Soliman  [address]  [facility] [address]       Dear Colleague,    Thank you for the opportunity to participate in the care of your patient, Patty Soliman, at the [facility] HEART CLINIC [facility]. Please see a copy of my visit note below.    CARDIOLOGY  CLINIC VIDEO VISIT    Date of video visit: 11/03/22        Patty Soliman is a 72 year old female who is being evaluated via a billable video visit.        I have reviewed and updated the patient's Past Medical History, Social History, Family History and Medication List.    MEDICATIONS:  Current Outpatient Medications   Medication Sig Dispense Refill     sildenafil (REVATIO) 20 MG tablet Take 1 tablet (20 mg) by mouth 3 times daily PA in Process, RX to be sent upon approval       aspirin 325 MG tablet [ASPIRIN 325 MG TABLET] daily.       atorvastatin (LIPITOR) 10 MG tablet [ATORVASTATIN (LIPITOR) 10 MG TABLET] daily.       cholecalciferol, vitamin D3, 1,000 unit tablet [CHOLECALCIFEROL, VITAMIN D3, 1,000 UNIT TABLET] Take 2,000 Units by mouth daily.       coenzyme Q10 200 mg capsule [COENZYME Q10 200 MG CAPSULE] Take 200 mg by mouth daily.       dorzolamide-timolol (COSOPT) 22.3-6.8 mg/mL ophthalmic solution [DORZOLAMIDE-TIMOLOL (COSOPT) 22.3-6.8 MG/ML OPHTHALMIC SOLUTION] 1 gtt       fluticasone (FLONASE) 50 mcg/actuation nasal spray [FLUTICASONE (FLONASE) 50 MCG/ACTUATION NASAL SPRAY] as needed.       furosemide (LASIX) 20 MG tablet Take 1 tablet (20 mg) by mouth daily 90 tablet 3     LACTOBACILLUS COMBO NO.6 (PROBIOTIC COMPLEX ORAL) [LACTOBACILLUS COMBO NO.6 (PROBIOTIC COMPLEX ORAL)] 1       latanoprost (XALATAN) 0.005 % ophthalmic solution [LATANOPROST (XALATAN) 0.005 % OPHTHALMIC SOLUTION] Administer 1 drop to both eyes daily.        letrozole (FEMARA) 2.5 mg tablet [LETROZOLE (FEMARA) 2.5 MG TABLET] Take 1 tablet by mouth daily.       metFORMIN (GLUCOPHAGE) 500 MG  tablet [METFORMIN (GLUCOPHAGE) 500 MG TABLET] Take 1,000 mg by mouth daily with breakfast.        metoprolol tartrate (LOPRESSOR) 25 MG tablet [METOPROLOL TARTRATE (LOPRESSOR) 25 MG TABLET] Take 25 mg by mouth daily.        omeprazole (PRILOSEC) 20 MG capsule [OMEPRAZOLE (PRILOSEC) 20 MG CAPSULE] Take 20 mg by mouth daily before breakfast.        Treprostinil 64 MCG POWD Inhale 64 mcg into the lungs 4 times daily       triamterene-hydrochlorothiazide (DYAZIDE) 37.5-25 mg per capsule [TRIAMTERENE-HYDROCHLOROTHIAZIDE (DYAZIDE) 37.5-25 MG PER CAPSULE] Take by mouth daily.         ALLERGIES  Escitalopram oxalate [escitalopram], Sulfa (sulfonamide antibiotics) [sulfa drugs], and Sulfamethoxazole-trimethoprim [sulfamethoxazole w/trimethoprim]      Brief physical exam:  General: In no acute distress, upright and calm.  Eyes: No apparent redness or discharge.   Chest: No labored breathing, no cough during exam or audible wheezing.   Neuro: No obvious focal defects or tremors.   Psych: Alert and oriented. Does not appear anxious.     The rest of a comprehensive physical examination is deferred due to public health emergency video visit restrictions.       Primary PH cardiologist: Dr. Sanchez      HPI:  Ms. Soliman is a pleasant 72 year old female with a PMHx including DM, HTN, dyslipidemia, CKD, and progressive BOYER. Workup revealed RV dilation and RV dysfunction via echocardiogram. 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 Additionally, this summer she was diagnosed with pulmonary fibrosis. She was then referred to see Dr. aSnchez in August, and underwent a RHC which showed moderate PAH with reduced cardiac output. At that time, it was recommended she start inhaled Tyvaso DPI, and was started on digoxin for RV support. Plan was to also add sildenafil down the road. Additionally, she was referred to our ILD clinic.     Today, we are following up virtually. She is now on Tyvaso 64mcg  QID and tells me that thus far she is tolerating well. She thinks it may be helping slightly and she appears to be having minimal side effects. She does have a cough, but this is chronic and feels as if it is actually slightly better. She had a single episode of severe dizziness while ambulating in from her garage to the point where she had to hold onto the counter and actually had some incontinence. Also notably, a few weeks ago, she sent a message reporting low heart rates at night when she wakes up SOB at times. She also relays having low HRs at night on a previous cardiac monitor this summer. At that point her digoxin was discontinued. However, today, a family member Summer who accompanies her today, relays that she rarely sees her with heart rates under 110 at home, even at rest. She is wearing her oxygen at 5L currently and reports sats in the low 90s at rest, but will desat easily to 70s with activity. Upon questioning, however, she thinks she recovers more quickly now on Tyvaso.     The patient did not have any new labs performed for our visit today, but most recent available labs were reviewed as below.       CURRENT PULMONARY HYPERTENSION REGIMEN:    PAH Rx: Tyvaso 64mcg DPI QID    Diuretics: Lasix 20mg every other day    Oxygen: 5LNC     Anticoagulation: None    Pulm: Dr. Lunsford ([facility])    Assessment/Plan:    1. Pulmonary hypertension.   --Ms. Soliman has moderate to severe PAH with evidence of RV dysfunction. She is now on Tyvaso DPI 64mcg QID and tolerating well and noting some mild clinical improvement in her breathing and how quickly her sats recover with activity. As planned, will proceed with combination therapy by adding sildenafil. Will submit PA today.    --Unfortunately, her REVEAL risk score is high for adverse outcomes at 12. She is not a candidate for lung transplantation because of her age.   --She was initially placed on digoxin for RV support, but this had to be stopped due to  bradycardia/dizziness. However, today she reports more tachycardia. When we see her back in follow up, will try to sort this out further, with an EKG and potentially repeat cardiac monitor.    --As today is a virtual visit I cannot fully assess her volume status. However, she reports that she initially developed more swelling but this has improved with the addition of lasix 20mg every other day. She is also on a triam/hctz combo pill for her BP. Also when she returns, will check repeat labs and BP, and see if potentially her antihypertensive medication can/should be discontinued.    --She follows with pulmonary via the [facility] system, but has been referred for further evaluation at the ILD clinic here at the [facility]. She is still awaiting an appt. Will message their team for follow up. For now, continue supplemental oxygen as is.       Follow up plan: Return to see me in person in clinic in the next few weeks, after we start sildenafil, with repeat labs.       Testing/labs:    Most recent labs:    Latest Reference Range & Units 08/19/22 13:06   Sodium 136 - 145 mmol/L 136   Potassium 3.4 - 5.3 mmol/L 3.7   Chloride 98 - 107 mmol/L 97 (L)   Carbon Dioxide (CO2) 22 - 29 mmol/L 26   Urea Nitrogen 8.0 - 23.0 mg/dL 24.3 (H)   Creatinine 0.51 - 0.95 mg/dL 1.30 (H)   GFR Estimate >60 mL/min/1.73m2 43 (L)   Calcium 8.8 - 10.2 mg/dL 10.0   Anion Gap 7 - 15 mmol/L 13   Albumin 3.5 - 5.2 g/dL 3.9   Protein Total 6.4 - 8.3 g/dL 7.1   Alkaline Phosphatase 35 - 104 U/L 152 (H)   ALT 10 - 35 U/L 18   AST 10 - 35 U/L 29   Bilirubin Total <=1.2 mg/dL 1.1   Glucose 70 - 99 mg/dL 139 (H)   (L): Data is abnormally low  (H): Data is abnormally high      Other recent pertinent testing:    Echo 8/1/22  Interpretation Summary  Left ventricular function is normal.The ejection fraction is 60-65%.  Paradoxical septal motion consistent with right ventricular pressure and  volume overload is present.  Moderate to severe right ventricular  dilation is present. The RV function is  moderately to severely reduced. The RVFAC is 30%, the TAPSE is 1.6 cm, and the  RV S' is 6 cm/s.  There is early shunting of saline on the bubble study. This is consistent with  an intracardiac shunt, likely due to a patent foramen ovale.  Moderate to severe tricuspid insufficiency is present.  The estimated PA systolic pressure is at least 92 mmHg.  There is mild dilation of the ascending aorta (3.7 cm, indexed value 2.31  cm/m2).  IVC diameter >2.1 cm collapsing <50% with sniff suggests a high RA pressure  estimated at 15 mmHg or greater.  No pericardial effusion is present.  There is no prior study for direct comparison.    [facility] 8/19/22  Hemodynamics    Baseline Hemodynamics:  RA 9/16/9 mmHg  RV 65/11 mmHg  PA 64/20/37 mmHg  CWP 21/22/13 mmHg  CO (Terrance) 1.77 L/min  CI (Terrance) 1.11 L/min/m2  CO (TD) 2.45 L/min  CI (TD) 1.54 L/min/m2   mmHg   SVR 4,338 dynes (by measured Terrance)  PVR 13.56 GRAFF (by measured Terrance)  PVR 9.8 (by TD)    During Nitric Oxide:  RA 12/8/7 mmHg  PA 49/17/30 mmHg  CWP 4/4/4 mmHg  CO (TD) 3.4 L/min  CI (TD) 2.13 L/min/m2  Calculated cardiac output and index not included as the baseline hemodynamics were performed using measured Terrance.    mmHg   SVR 2,188 dynes  PVR 7.64 GRAFF    Shunt Run saturations:   SVC: 59%  IVC: 72% (syringe in contact with room air prior to processing)   RA: 56%  RV: 55%  PA: 56%     Right sided filling pressures are mildly elevated. Left sided filling pressures are mildly elevated. Moderately elevated pulmonary artery hypertension. Reduced cardiac output level.       NYHA Functional Class:  3      Video-Visit Details    Type of service:  Video Visit    Video Start Time: 1525  Video End Time: 1545    An additional 15 minutes was spent today performing chart and history review, pre and post visit documentation, and care coordination.    Distant Location (provider location):  On-site    Originating Location (pt. Location):  Home    Distant Location (provider location):  [facility] HEART [facility]    Platform used for Video Visit: Kobe Duke PA-C  [facility]  Pager [phone]       None known in lifetime

## 2022-11-03 NOTE — NURSING NOTE
Chief Complaint   Patient presents with     Follow Up       Patient confirms medications and allergies are accurate via patients echeck in completion, and or denies any changes since last reviewed/verified.       Suzanna Celeste, Virtual Facilitator

## 2022-11-07 ENCOUNTER — TELEPHONE (OUTPATIENT)
Dept: PULMONOLOGY | Facility: CLINIC | Age: 72
End: 2022-11-07

## 2022-11-07 ENCOUNTER — TELEPHONE (OUTPATIENT)
Dept: CARDIOLOGY | Facility: CLINIC | Age: 72
End: 2022-11-07

## 2022-11-07 DIAGNOSIS — I27.20 PULMONARY HYPERTENSION (H): Primary | ICD-10-CM

## 2022-11-07 DIAGNOSIS — J84.9 ILD (INTERSTITIAL LUNG DISEASE) (H): Primary | ICD-10-CM

## 2022-11-07 NOTE — TELEPHONE ENCOUNTER
11/7/2022 4:18 PM -  Sent request to Ashlyn Rodriguez/[facility] for paper PA as this pts plan is not ePA enabled.       Xin FONSECA CMA  Clinical   Cardiology/Pulmonary Hypertension   [facility]  PH: [phone]

## 2022-11-07 NOTE — TELEPHONE ENCOUNTER
----- Message from Maury Benjamin sent at 11/3/2022  4:23 PM CDT -----  Regarding: FW: New Pulm Referral  Sending for review of reason for referral. Pt's referral notes specify ILD.  ----- Message -----  From: Tiana Whitmore RN  Sent: 11/3/2022   3:39 PM CDT  [facility]  Subject: New Pulm Referral                                Please reach out to patient to schedule a new pulmonary appt. She has tried to reach out to the clinic to schedule but has not been able to connect with anyone. Thank you!    LETA Hernández

## 2022-11-07 NOTE — TELEPHONE ENCOUNTER
PA Initiation    ----------------------------  Insurance: Called [facility] -  Medica Part D information  BIN: [id number]   PCN: [id number]   ID#: [id number]  GRP#: [id number]    PA Initiation    Medication: Sildenafil 20 mg TID  Insurance Company: [employer] - Phone [phone] Fax [phone]:  ID: [id number]  BIN: [id number] PCN: [id number] RXGRP: [id number]  [id number] [place] [place] - [address]  Filling Pharmacy Phone: [phone]  Filling Pharmacy Fax:    Start Date:  11/7/22  - via CMM key:   [id number]  Resubmitted using [facility] as PBM   W/ RHC     Dr Sanchez/LETA Wu    [note: 11/7/2022 via - CMM key:[id number] - deleted - showed [facility] as PBM - incorrect.]     Xin FONSECA CMA  Clinical   Cardiology/Pulmonary Hypertension   [facility]  PH: [phone]

## 2022-11-07 NOTE — TELEPHONE ENCOUNTER
ILD New Patient Referral: Pre visit communication    Patient: Patty Soliman  Reason for Referral: Fibrosis  Referring Physician: Abigail   Referring Clinic/Contact: Phone#:     Chest CT scan: YES. Date-August 2022. Location-[facility].  Biopsy: No  PFT's:YES. Date-August . Location-[facility].  Additional testing: Echo: YES. Date-2022. Location-[facility].  Right Heart Cath: YES. Date-2022. Location-[facility].  Other Clinics with related records: [facility]     Current symptoms: SOB with movement on 5 liters can drop into 70s with minimal activity.     Current related prescriptions: No pulmonary but on several cardiac medications    Supplemental oxygen? YES. 5 liters [employer]     In review of apparent records and conversation with patient; recommend first visit with ILD provider be conducted :  Testing needed: CT scan and Full PFT's and walk    Additional notes:

## 2022-11-07 NOTE — TELEPHONE ENCOUNTER
----- Message from Tiana Whitmore RN sent at 11/3/2022  3:43 PM CDT -----  Regarding: Sildenafil PA  Hi team,    Please start PA for sildenafil. Please let Kelli know when it is approved, so that she can send out the Rx.    Thank you!  Edgar

## 2022-11-08 NOTE — TELEPHONE ENCOUNTER
11/8/2022 9:25 AM -       SILDENAFIL - APPROVED      Approved. This drug has been approved under the Member's Medicare Part D benefit. Approved quantity: 90 tablets per 30 day(s). You may fill up to a 90 day supply except for those on Specialty Tier 5, which can be filled up to a 30 day supply. Please call the pharmacy to process the prescription claim.    Need PA approval dates .     Prior Authorization Approval    Authorization Effective Date:  NOT AVAILABLE, NO LETTER received as of 11/9/22.    Authorization Expiration Date:  NOT AVAILABLE, NO LETTER received as of 11/9/22.      Medication: Sildenafil 20 mg TID  Approved Dose/Quantity: 90 FOR 30   Reference #:   NOT AVAILABLE, NO LETTER received as of 11/9/22.    Insurance Company: MEDICA - Phone [phone] Fax [phone]:  ID: [id number]  BIN: [id number] PCN: [id number] RXGRP: [id number]  Expected CoPay:       CoPay Card Available:      Foundation Assistance Needed:    Which Pharmacy is filling the prescription (Not needed for infusion/clinic administered): [id number] - [facility], [place] - [address] [place] [address]  Pharmacy Notified:    Patient Notified:      Routed to LETA Wu to send rx.     Xin FONSECA CMA  Clinical   Cardiology/Pulmonary Hypertension   [facility]  PH: [phone]

## 2022-11-09 ENCOUNTER — TELEPHONE (OUTPATIENT)
Dept: CARDIOLOGY | Facility: CLINIC | Age: 72
End: 2022-11-09

## 2022-11-09 RX ORDER — SILDENAFIL CITRATE 20 MG/1
20 TABLET ORAL 3 TIMES DAILY
Qty: 90 TABLET | Refills: 11 | Status: SHIPPED | OUTPATIENT
Start: 2022-11-09 | End: 2023-01-16

## 2022-11-09 NOTE — TELEPHONE ENCOUNTER
Called patient to notify sildenafil approval. Script sent to pharm. Discussed S/E to monitor for. scheduled  Follow-up with labs. Patient to notify RN when she starts. Pt verbalized understanding.     Kelli Hager RN on 11/9/2022 at 2:58 PM

## 2022-11-10 ENCOUNTER — TELEPHONE (OUTPATIENT)
Dept: CARDIOLOGY | Facility: CLINIC | Age: 72
End: 2022-11-10

## 2022-11-10 NOTE — TELEPHONE ENCOUNTER
11/18/22 Patient reported low BP's 90/50's. Diazide was discontinued. Patient still on Sildenafil 20TID  Kelli Hager RN on 11/21/2022 at 2:34 PM    ----------------------------------------------  Pt started sildenafil 20mg Tid today. Has a follow-up . Will call for any concerns or new S/S.   Kelli Hager RN on 11/10/2022 at 2:09 PM

## 2022-11-18 ENCOUNTER — NURSE TRIAGE (OUTPATIENT)
Dept: CARDIOLOGY | Facility: CLINIC | Age: 72
End: 2022-11-18

## 2022-11-18 ASSESSMENT — ENCOUNTER SYMPTOMS
SYNCOPE: 0
NERVOUS/ANXIOUS: 1
NUMBNESS: 0
NECK MASS: 0
SPEECH CHANGE: 0
SMELL DISTURBANCE: 0
DYSURIA: 1
PALPITATIONS: 0
HYPOTENSION: 1
BACK PAIN: 1
TASTE DISTURBANCE: 0
FATIGUE: 1
SLEEP DISTURBANCES DUE TO BREATHING: 0
MYALGIAS: 1
ALTERED TEMPERATURE REGULATION: 0
HEADACHES: 0
HEMOPTYSIS: 0
SNORES LOUDLY: 0
DISTURBANCES IN COORDINATION: 0
DIFFICULTY URINATING: 0
DECREASED APPETITE: 1
HEMATURIA: 0
FEVER: 0
COUGH: 1
FLANK PAIN: 0
POSTURAL DYSPNEA: 1
POLYDIPSIA: 0
ARTHRALGIAS: 0
EXERCISE INTOLERANCE: 1
EYE REDNESS: 0
EYE PAIN: 0
MUSCLE CRAMPS: 1
LOSS OF CONSCIOUSNESS: 0
JOINT SWELLING: 0
STIFFNESS: 0
NAIL CHANGES: 0
MUSCLE WEAKNESS: 1
PANIC: 0
SINUS PAIN: 0
DECREASED CONCENTRATION: 0
NIGHT SWEATS: 0
SORE THROAT: 0
SINUS CONGESTION: 1
CHILLS: 0
LIGHT-HEADEDNESS: 1
WEAKNESS: 0
LEG PAIN: 0
WHEEZING: 0
EYE IRRITATION: 0
NECK PAIN: 1
WEIGHT LOSS: 0
ORTHOPNEA: 1
SPUTUM PRODUCTION: 0
DYSPNEA ON EXERTION: 1
TREMORS: 0
POLYPHAGIA: 0
INCREASED ENERGY: 1
EYE WATERING: 0
MEMORY LOSS: 0
SEIZURES: 0
WEIGHT GAIN: 0
DEPRESSION: 1
HYPERTENSION: 0
SKIN CHANGES: 0
HALLUCINATIONS: 0
DOUBLE VISION: 0
SHORTNESS OF BREATH: 1
INSOMNIA: 0
COUGH DISTURBING SLEEP: 0
TINGLING: 0
PARALYSIS: 0
HOARSE VOICE: 0
DIZZINESS: 0
TROUBLE SWALLOWING: 1
POOR WOUND HEALING: 0

## 2022-11-18 NOTE — TELEPHONE ENCOUNTER
Called patient to follow-up on s/s. BP 90's/50's this AM and rechecked during call. 111/81. Discussed as she recently started the sildenafil this might be contributing. Questioned breathing, swelling, hydration. Pt does not hydrate well. Does not have any new or worsening SOB, does not have any swelling, denies syncope.     -Called and spoke with Janet Duke. GARIMA to discontinue Dyazide for hyptension    - Called patient back and update. Pt will monitor BP BID over the weekend and is educated to visit the ED if she has any feelings of dizzyness/lightheaded or fainting. Pt verbalized understanding    Kelli Hager RN on 11/18/2022 at 2:24 PM

## 2022-11-18 NOTE — TELEPHONE ENCOUNTER
"Received a call from the call center to discuss low blood pressure.  Spoke to Patty who says she has not been feeling right today, and noticed when she is up on her feet too long, she gets woozy. She checked her blood pressure for the last 30 minutes and have been running 90's/50's. She says she started a new drug one week ago-sildenafil, so is wondering if this is what is affecting her. She does not have any blood pressures from yesterday. Had her check her BP on the call and was 104/59 . She says she is not very good at staying hydrated, so encouraged her to drink fluids. She says she is able to go about her activities as usual, but could tell something was off. She is requesting to speak to Antonio GILLETTE with Dr. Sanchez.   Advised a message will be sent to [facility] for follow up.  1. BLOOD PRESSURE: \"What is the blood pressure?\" \"Did you take at least two measurements 5 minutes apart?\" yes  2. ONSET: \"When did you take your blood pressure?\" 30 minutes before calling  3. HOW: \"How did you obtain the blood pressure?\" (e.g., visiting nurse, automatic home BP monitor) home BP monitor  4. HISTORY: \"Do you have a history of low blood pressure?\" \"What is your blood pressure normally?\" 120's/70's-80's  5. MEDICATIONS: \"Are you taking any medications for blood pressure?\" If Yes, ask: \"Have they been changed recently?\" new medication, sildenafil one week ago  6. PULSE RATE: \"Do you know what your pulse rate is?\"  bpm  7. OTHER SYMPTOMS: \"Have you been sick recently?\" \"Have you had a recent injury?\"      Additional Information    Negative: Systolic BP < 80 and NOT dizzy, lightheaded or weak (feels normal)    Protocols used: BLOOD PRESSURE - LOW-A-OH      "

## 2022-11-22 ENCOUNTER — TELEPHONE (OUTPATIENT)
Dept: CARDIOLOGY | Facility: CLINIC | Age: 72
End: 2022-11-22

## 2022-11-22 NOTE — TELEPHONE ENCOUNTER
Pt BP has been stable after discontinue of Dyazide. Has not had any increased dizziness. No new concerns. No changes at this time. Pt has a follow-up 11/28    Kelli Hager RN on 11/22/2022 at 11:00 AM

## 2022-11-22 NOTE — PROGRESS NOTES
[facility]    Reason for Visit  Patty Soliman is a 72 year old year old female who is being seen for shortness of breath.   HPI  Patient is 72 years old female with past history outlined below presents today to establish care with us in the ILD clinic.  She had progressive exertional dyspnea over the last several years but does not seem to be much worse in the last 6 months.  Back in August 2020, CTA was negative for pulmonary embolism, ILD described as probable UIP pattern was observed.  She has been seen by our pulmonary hypertension clinic for severe PH by echocardiography, RVSP 60+ with structural and functional impairment of the RV.  She underwent right heart cath and pulmonary hypertension was confirmed with negative vasoreactivity testing. Her cardiac index was also noticed to be reduced. She was recently started on ilioprost and sildenafil, with some symptomatic improvement. Cardiology was not convinced that the degree of ILD explains her pulmonary hypertension, which I do agree with based on the CT scan from August 2022.  However, the most recent CT scan from today shows significant progression in ILD.  Her pattern is inconsistent with UIP to me and there is significant mosaicism and signs of small airway disease suggestive of hypersensitivity pneumonitis.  She did not have a trial of steroids before.  Neither does she have an inhaler or nebulizer treatments attempted.  She does have some down products like a jacket and a comforter, but no other triggers for HP by history.  She denies connective tissue disease symptoms with the exception to dysphagia which seems to be early phase and has not been worked up before.  She does not have Raynaud's phenomena, arthralgias, muscle stiffness, oral ulcers, epistaxis, sinus symptoms, skin rash, skin ulcers, or hematuria.  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    Review of Systems   Constitutional: Negative for chills, fever and weight loss.   HENT: Negative for ear discharge, ear pain, hearing loss, nosebleeds, sinus pain, sore throat and tinnitus.    Eyes: Negative for double vision, pain and redness.   Respiratory: Positive for cough and shortness of breath. Negative for hemoptysis, sputum production and wheezing.    Cardiovascular: Positive for orthopnea. Negative for chest pain and palpitations.   Genitourinary: Positive for dysuria. Negative for flank pain, hematuria and urgency.   Musculoskeletal: Positive for back pain, myalgias and neck pain.   Skin: Positive for itching. Negative for rash.   Neurological: Negative for dizziness, tingling, tremors, speech change, seizures, loss of consciousness, weakness and headaches.   Endo/Heme/Allergies: Negative for polydipsia.   Psychiatric/Behavioral: Positive for depression. Negative for hallucinations and memory loss. The patient is nervous/anxious. The patient does not have insomnia.         Current Outpatient Medications   Medication     aspirin 325 MG tablet     atorvastatin (LIPITOR) 10 MG tablet     cholecalciferol, vitamin D3, 1,000 unit tablet     coenzyme Q10 200 mg capsule     dorzolamide-timolol (COSOPT) 22.3-6.8 mg/mL ophthalmic solution     fluticasone (FLONASE) 50 mcg/actuation nasal spray     furosemide (LASIX) 20 MG tablet     latanoprost (XALATAN) 0.005 % ophthalmic solution     letrozole (FEMARA) 2.5 mg tablet     metFORMIN (GLUCOPHAGE) 500 MG tablet     metoprolol tartrate (LOPRESSOR) 25 MG tablet     omeprazole (PRILOSEC) 20 MG capsule     sildenafil (REVATIO) 20 MG tablet     Treprostinil 64 MCG POWD     No current facility-administered medications for this visit.     Allergies   Allergen Reactions     Escitalopram Oxalate [Escitalopram] Unknown     Other reaction(s): fatigue     Sulfa (Sulfonamide Antibiotics) [Sulfa Drugs] Unknown     Sulfamethoxazole-Trimethoprim [Sulfamethoxazole  W/Trimethoprim] Unknown     Other reaction(s): Unknown     Past Medical History:   Diagnosis Date     Cancer (H)      Diabetes mellitus (H)     Type 2 Diabetic     Hypertension        Past Surgical History:   Procedure Laterality Date     CV RIGHT HEART CATH MEASUREMENTS RECORDED N/A 8/19/2022    Procedure: Heart Cath Right Heart Cath;  Surgeon: Daniel Sanchez MD;  Location:  HEART CARDIAC CATH LAB     NV MASTECTOMY,PARTIAL,  WITH AXILLARY LYMPHADENECTOMY Right 4/18/2018    Procedure: Right Lumpectomy after Wire Localization; [place] Lymph Node Biopsy;  Surgeon: Katie Sahu MD;  Location: [facility];  Service: General     TUBAL LIGATION         Social History     Socioeconomic History     Marital status:      Spouse name: Not on file     Number of children: Not on file     Years of education: Not on file     Highest education level: Not on file   Occupational History     Not on file   Tobacco Use     Smoking status: Never     Smokeless tobacco: Never   Substance and Sexual Activity     Alcohol use: Yes     Comment: Alcoholic Drinks/day: rarely     Drug use: No     Sexual activity: Not on file   Other Topics Concern     Not on file   Social History Narrative     Not on file     Social Determinants of Health     Financial Resource Strain: Not on file   Food Insecurity: Not on file   Transportation Needs: Not on file   Physical Activity: Not on file   Stress: Not on file   Social Connections: Not on file   Intimate Partner Violence: Not on file   Housing Stability: Not on file       No family history on file.      Vitals: There were no vitals taken for this visit.    Exam:   GENERAL APPEARANCE: Well developed, well nourished, alert, and in no apparent distress.  LYMPHATICS: No significant cervical, or supraclavicular nodes.  HEENT: Normal oral mucus membranes, tongue and dentition. Normal nose.   RESP: good air flow throughout.  No crackles. No rhonchi. No wheezes.  CV: Normal S1, S2,  regular rhythm, normal rate. No murmur.  No LE edema.   ABD: Soft, lax, nontender.  MS: extremities normal. No clubbing. No cyanosis.  SKIN: no rash on limited exam.  NEURO: Mentation intact, speech normal, normal gait and stance.  PSYCH: mentation appears normal. and affect normal/bright.    Results:  RHC 8/19/22  RA 9/16/9 mmHg  RV 65/11 mmHg  PA 64/20/37 mmHg  CWP 21/22/13 mmHg  CO (Terrance) 1.77 L/min  CI (Terrance) 1.11 L/min/m2  CO (TD) 2.45 L/min  CI (TD) 1.54 L/min/m2   mmHg   SVR 4,338 dynes (by measured Terrance)  PVR 13.56 GRAFF (by measured Terrance)  PVR 9.8 (by TD)     No significant response to vasodilator therapy.     PFTs today 11/23/22  Most Recent [facility] Pulmonary Function Testing    FVC-Pred   Date Value Ref Range Status   11/23/2022 2.53 L      FVC-Pre   Date Value Ref Range Status   11/23/2022 1.80 L      FVC-%Pred-Pre   Date Value Ref Range Status   11/23/2022 70 %      FEV1-Pre   Date Value Ref Range Status   11/23/2022 1.59 L      FEV1-%Pred-Pre   Date Value Ref Range Status   11/23/2022 81 %      FEV1FVC-Pred   Date Value Ref Range Status   11/23/2022 78 %      FEV1FVC-Pre   Date Value Ref Range Status   11/23/2022 88 %      No results found for: 20029  FEFMax-Pred   Date Value Ref Range Status   11/23/2022 5.03 L/sec      FEFMax-Pre   Date Value Ref Range Status   11/23/2022 4.99 L/sec      FEFMax-%Pred-Pre   Date Value Ref Range Status   11/23/2022 99 %      ExpTime-Pre   Date Value Ref Range Status   11/23/2022 5.10 sec      FIFMax-Pre   Date Value Ref Range Status   11/23/2022 4.16 L/sec      FEV1FEV6-Pred   Date Value Ref Range Status   11/23/2022 79 %      FEV1FEV6-Pre   Date Value Ref Range Status   11/23/2022 88 %      Interpretation:   Isolated severe reduction in DLCO in keeping with pulmonary vascular disease. No evidence of restriction. No significant change compared to 8/2022.      Chest imaging:  Personally reviewed CTA chest images in 8/2022  Mosaicism suggestive of small airway  disease, mild bibasilar subpleural reticulation without honeycombing or traction bronchiectasis.     Personally reviewed HRCT chest images from today:   Significant progression in mosaicism, which is even more pronounced on exhalation images. Stable bibasilar reticular findings. Overall picture could be fitting for subacute hypersensitivity pneumonitis.       Assessment and plan:   1. ILD, favor hypersensitivity pneumonitis (HP)  Patient is 72F with ILD and chronic hypoxic respiratory failure presents to establish care. Her ILD seems to be progressive hypersensitivity pneumonitis pattern, based on mosaicism on CT and aitrapping on exhalation. She is a lifetime nonsmoker which is associated with slight increase in HP incidence, and has some down products. I believe her disease is subacute as her TLC remains intact, although some (pseudonormalization) is possible with airtrapping. I do agree with cardiology that her findings on CT 8/2022 do not explain the degree of pulmonary hypertension she has, and other etiologies are worth investigating.   Plan:   - Remove all down products from environment   - HP 1 & 2 panels   - Complete CTD workup with anticentromere and RNP antibodies given pulmonary hypertension and ILD (scleroderma is in her differential)  - Esophogram for dysphagia   - Prednisone taper, starting from 50 mg po daily (50 mg/kg), and taper down by 10 mg every two weeks  - G6PD level for dapsone therapy (allergic to sulfa)  - Dapsone 100 mg po daily for PJP prophylaxis until prednisone is < 20 mg   - PFTs in 3 months  - HRCT chest in 3 months  - If disease is steroid responsive, we will start MMF at the 3 months visit in addition to ICS  - RTC in 3 months     2. Precapillary pulmonary hypertension  Receiving ilioprost and sildenafil per PH clinic with symptomatic improvement.     3. Dysphagia  - Esophagram per above     4. Chronic hypoxic respiratory failure  Needing 15 LPM with activity during six minutes  walk today   - Portable home O2     5. Osteopenia  Noted on CT chest  - Dexa scan to screen for osteoporosis, and will prescribe alendronate if esophagram ok    I spent 60 minutes reviewing chart, reviewing test results, talking with and examining patient, formulating plan, and documentation on the day of the encounter.          Answers for HPI/ROS submitted by the patient on 11/18/2022  General Symptoms: Yes  Skin Symptoms: Yes  HENT Symptoms: Yes  EYE SYMPTOMS: Yes  HEART SYMPTOMS: Yes  LUNG SYMPTOMS: Yes  INTESTINAL SYMPTOMS: No  URINARY SYMPTOMS: Yes  GYNECOLOGIC SYMPTOMS: No  BREAST SYMPTOMS: No  SKELETAL SYMPTOMS: Yes  BLOOD SYMPTOMS: No  NERVOUS SYSTEM SYMPTOMS: Yes  MENTAL HEALTH SYMPTOMS: Yes  Congestion: Yes  Trouble swallowing: Yes   Voice hoarseness: No  Mouth sores: Yes  Tooth pain: Yes  Gum tenderness: Yes  Bleeding gums: Yes  Change in taste: No  Change in sense of smell: No  Dry mouth: Yes  Hearing aid used: No  Neck lump: No  Loss of appetite: Yes  Weight gain: No  Fatigue: Yes  Night sweats: No  Increased stress: Yes  Excessive hunger: No  Feeling hot or cold when others believe the temperature is normal: No  Loss of height: No  Post-operative complications: No  Surgical site pain: No  Change in or Loss of Energy: Yes  Hyperactivity: No  Confusion: No  Changes in hair: Yes  Changes in moles/birth marks: No  Changes in nails: No  Acne: No  Hair in places you don't want it: No  Change in facial hair: No  Warts: No  Non-healing sores: No  Scarring: No  Flaking of skin: Yes  Color changes of hands/feet in cold : Yes  Sun sensitivity: No  Skin thickening: No  Vision loss: No  Dry eyes: Yes  Watery eyes: No  Eye bulging: No  Flashing of lights: No  Spots: No  Floaters: No  Crossed eyes: No  Tunnel Vision: No  Yellowing of eyes: No  Eye irritation: No  Pain in legs with walking: No  Fingers or toes appear blue: No  High blood pressure: No  Low blood pressure: Yes  Fainting: No  Murmurs: No  Pacemaker:  No  Varicose veins: No  Edema or swelling: No  Wake up at night with shortness of breath: No  Light-headedness: Yes  Exercise intolerance: Yes  Snoring: No  Difficulty breathing on exertion: Yes  Nighttime Cough: No  Difficulty breathing when lying flat: Yes  Trouble holding urine or incontinence: Yes  Increased frequency of urination: No  Decreased frequency of urination: No  Frequent nighttime urination: No  Difficulty emptying bladder: No  Swollen joints: No  Joint pain: No  Bone pain: No  Muscle cramps: Yes  Muscle weakness: Yes  Joint stiffness: No  Bone fracture: No  Trouble with coordination: No  Difficulty walking: Yes  Paralysis: No  Numbness: No  Trouble thinking or concentrating: No  Mood changes: No  Panic attacks: No

## 2022-11-23 ENCOUNTER — LAB (OUTPATIENT)
Dept: LAB | Facility: CLINIC | Age: 72
End: 2022-11-23
Payer: MEDICARE

## 2022-11-23 ENCOUNTER — TELEPHONE (OUTPATIENT)
Dept: PULMONOLOGY | Facility: CLINIC | Age: 72
End: 2022-11-23

## 2022-11-23 ENCOUNTER — ANCILLARY PROCEDURE (OUTPATIENT)
Dept: CT IMAGING | Facility: CLINIC | Age: 72
End: 2022-11-23
Attending: INTERNAL MEDICINE
Payer: MEDICARE

## 2022-11-23 ENCOUNTER — OFFICE VISIT (OUTPATIENT)
Dept: PULMONOLOGY | Facility: CLINIC | Age: 72
End: 2022-11-23
Attending: INTERNAL MEDICINE
Payer: MEDICARE

## 2022-11-23 VITALS
OXYGEN SATURATION: 92 % | BODY MASS INDEX: 23.55 KG/M2 | HEART RATE: 111 BPM | SYSTOLIC BLOOD PRESSURE: 109 MMHG | WEIGHT: 128 LBS | DIASTOLIC BLOOD PRESSURE: 72 MMHG | HEIGHT: 62 IN

## 2022-11-23 DIAGNOSIS — J84.9 ILD (INTERSTITIAL LUNG DISEASE) (H): ICD-10-CM

## 2022-11-23 DIAGNOSIS — Z79.2 PROPHYLACTIC ANTIBIOTIC: ICD-10-CM

## 2022-11-23 DIAGNOSIS — R09.02 HYPOXIA: ICD-10-CM

## 2022-11-23 DIAGNOSIS — I27.21 PAH (PULMONARY ARTERY HYPERTENSION) (H): ICD-10-CM

## 2022-11-23 DIAGNOSIS — M85.88 OSTEOPENIA OF LUMBAR SPINE: ICD-10-CM

## 2022-11-23 DIAGNOSIS — R06.09 DOE (DYSPNEA ON EXERTION): ICD-10-CM

## 2022-11-23 DIAGNOSIS — J84.9 ILD (INTERSTITIAL LUNG DISEASE) (H): Primary | ICD-10-CM

## 2022-11-23 DIAGNOSIS — R13.14 PHARYNGOESOPHAGEAL DYSPHAGIA: ICD-10-CM

## 2022-11-23 DIAGNOSIS — R09.02 HYPOXIA: Primary | ICD-10-CM

## 2022-11-23 DIAGNOSIS — J67.9 HYPERSENSITIVITY PNEUMONIA (H): ICD-10-CM

## 2022-11-23 LAB
6 MIN WALK (FT): 500 FT
6 MIN WALK (M): 152 M
ALBUMIN SERPL BCG-MCNC: 3.8 G/DL (ref 3.5–5.2)
ALP SERPL-CCNC: 176 U/L (ref 35–104)
ALT SERPL W P-5'-P-CCNC: 16 U/L (ref 10–35)
ANION GAP SERPL CALCULATED.3IONS-SCNC: 18 MMOL/L (ref 7–15)
AST SERPL W P-5'-P-CCNC: 27 U/L (ref 10–35)
BILIRUB SERPL-MCNC: 0.6 MG/DL
BUN SERPL-MCNC: 27.5 MG/DL (ref 8–23)
CALCIUM SERPL-MCNC: 10.1 MG/DL (ref 8.8–10.2)
CHLORIDE SERPL-SCNC: 98 MMOL/L (ref 98–107)
CREAT SERPL-MCNC: 1.27 MG/DL (ref 0.51–0.95)
CRP SERPL-MCNC: 6.77 MG/L
DEPRECATED HCO3 PLAS-SCNC: 25 MMOL/L (ref 22–29)
ERYTHROCYTE [DISTWIDTH] IN BLOOD BY AUTOMATED COUNT: 14.8 % (ref 10–15)
GFR SERPL CREATININE-BSD FRML MDRD: 44 ML/MIN/1.73M2
GLUCOSE SERPL-MCNC: 171 MG/DL (ref 70–99)
HCT VFR BLD AUTO: 42.5 % (ref 35–47)
HGB BLD-MCNC: 13.4 G/DL (ref 11.7–15.7)
MCH RBC QN AUTO: 29.1 PG (ref 26.5–33)
MCHC RBC AUTO-ENTMCNC: 31.5 G/DL (ref 31.5–36.5)
MCV RBC AUTO: 92 FL (ref 78–100)
NT-PROBNP SERPL-MCNC: ABNORMAL PG/ML (ref 0–900)
PLATELET # BLD AUTO: 206 10E3/UL (ref 150–450)
POTASSIUM SERPL-SCNC: 3.2 MMOL/L (ref 3.4–5.3)
PROT SERPL-MCNC: 7.1 G/DL (ref 6.4–8.3)
RBC # BLD AUTO: 4.61 10E6/UL (ref 3.8–5.2)
SODIUM SERPL-SCNC: 141 MMOL/L (ref 136–145)
WBC # BLD AUTO: 13.6 10E3/UL (ref 4–11)

## 2022-11-23 PROCEDURE — 82955 ASSAY OF G6PD ENZYME: CPT | Mod: 90 | Performed by: PATHOLOGY

## 2022-11-23 PROCEDURE — 85027 COMPLETE CBC AUTOMATED: CPT | Performed by: PATHOLOGY

## 2022-11-23 PROCEDURE — G0463 HOSPITAL OUTPT CLINIC VISIT: HCPCS | Mod: 25

## 2022-11-23 PROCEDURE — 86331 IMMUNODIFFUSION OUCHTERLONY: CPT | Mod: 90 | Performed by: PATHOLOGY

## 2022-11-23 PROCEDURE — 94729 DIFFUSING CAPACITY: CPT | Performed by: INTERNAL MEDICINE

## 2022-11-23 PROCEDURE — 71250 CT THORAX DX C-: CPT | Mod: MG | Performed by: RADIOLOGY

## 2022-11-23 PROCEDURE — 94375 RESPIRATORY FLOW VOLUME LOOP: CPT | Performed by: INTERNAL MEDICINE

## 2022-11-23 PROCEDURE — 99205 OFFICE O/P NEW HI 60 MIN: CPT | Mod: 25 | Performed by: INTERNAL MEDICINE

## 2022-11-23 PROCEDURE — 94726 PLETHYSMOGRAPHY LUNG VOLUMES: CPT | Performed by: INTERNAL MEDICINE

## 2022-11-23 PROCEDURE — 99000 SPECIMEN HANDLING OFFICE-LAB: CPT | Performed by: PATHOLOGY

## 2022-11-23 PROCEDURE — 86235 NUCLEAR ANTIGEN ANTIBODY: CPT | Performed by: INTERNAL MEDICINE

## 2022-11-23 PROCEDURE — 36415 COLL VENOUS BLD VENIPUNCTURE: CPT | Performed by: PATHOLOGY

## 2022-11-23 PROCEDURE — 80053 COMPREHEN METABOLIC PANEL: CPT | Performed by: PATHOLOGY

## 2022-11-23 PROCEDURE — 86140 C-REACTIVE PROTEIN: CPT | Performed by: PATHOLOGY

## 2022-11-23 PROCEDURE — G1010 CDSM STANSON: HCPCS | Performed by: RADIOLOGY

## 2022-11-23 PROCEDURE — 83516 IMMUNOASSAY NONANTIBODY: CPT | Mod: 90 | Performed by: PATHOLOGY

## 2022-11-23 PROCEDURE — 83880 ASSAY OF NATRIURETIC PEPTIDE: CPT | Performed by: PATHOLOGY

## 2022-11-23 PROCEDURE — 86606 ASPERGILLUS ANTIBODY: CPT | Mod: 90 | Performed by: PATHOLOGY

## 2022-11-23 RX ORDER — PREDNISONE 5 MG/1
TABLET ORAL
Qty: 450 TABLET | Refills: 0 | Status: SHIPPED | OUTPATIENT
Start: 2022-11-23 | End: 2022-11-28

## 2022-11-23 RX ORDER — DAPSONE 100 MG/1
100 TABLET ORAL DAILY
Qty: 30 TABLET | Refills: 1 | Status: SHIPPED | OUTPATIENT
Start: 2022-11-23 | End: 2022-12-19

## 2022-11-23 ASSESSMENT — ENCOUNTER SYMPTOMS
BACK PAIN: 1
DIZZINESS: 0
MYALGIAS: 1
PALPITATIONS: 0
FEVER: 0
LOSS OF CONSCIOUSNESS: 0
WEIGHT LOSS: 0
CHILLS: 0
HEMATURIA: 0
FLANK PAIN: 0
INSOMNIA: 0
COUGH: 1
DOUBLE VISION: 0
NECK PAIN: 1
HALLUCINATIONS: 0
SPEECH CHANGE: 0
HEMOPTYSIS: 0
TREMORS: 0
SHORTNESS OF BREATH: 1
SINUS PAIN: 0
NERVOUS/ANXIOUS: 1
DYSURIA: 1
SEIZURES: 0
EYE PAIN: 0
HEADACHES: 0
WEAKNESS: 0
EYE REDNESS: 0
POLYDIPSIA: 0
TINGLING: 0
ORTHOPNEA: 1
SPUTUM PRODUCTION: 0
DEPRESSION: 1
SORE THROAT: 0
MEMORY LOSS: 0
WHEEZING: 0

## 2022-11-23 NOTE — PATIENT INSTRUCTIONS
Please call our direct ILD nurses line for questions and concerns: [phone]    For scheduling, please call: [phone]    Remove all down products from your environment  Start prednisone today and Dapsone once G6PD test is confirmed normal

## 2022-11-23 NOTE — TELEPHONE ENCOUNTER
----- Message from nhi Rodriguez MD sent at 11/23/2022  3:05 PM CST -----  I noticed osteopenia on CT just now, can we please call the patient to schedule Dexa scan next available? I have placed the order.     Thanks  AK

## 2022-11-23 NOTE — NURSING NOTE
Chief Complaint   Patient presents with     Interstitial Lung Disease (ILD)     New ILD      Vitals were taken and medications were reconciled.    Laura Garcia RMA  10:58 AM

## 2022-11-23 NOTE — LETTER
11/23/2022         RE: Patty Soliman  [address]  [facility] [address]        Dear Colleague,    Thank you for referring your patient, Patty Soliman, to the [facility] FOR LUNG SCIENCE AND [facility] [place]. Please see a copy of my visit note below.    [facility] Disease [facility]    Reason for Visit  Patty Soliman is a 72 year old year old female who is being seen for shortness of breath.   HPI  Patient is 72 years old female with past history outlined below presents today to establish care with us in the ILD clinic.  She had progressive exertional dyspnea over the last several years but does not seem to be much worse in the last 6 months.  Back in August 2020, CTA was negative for pulmonary embolism, ILD described as probable UIP pattern was observed.  She has been seen by our pulmonary hypertension clinic for severe PH by echocardiography, RVSP 60+ with structural and functional impairment of the RV.  She underwent right heart cath and pulmonary hypertension was confirmed with negative vasoreactivity testing. Her cardiac index was also noticed to be reduced. She was recently started on ilioprost and sildenafil, with some symptomatic improvement. Cardiology was not convinced that the degree of ILD explains her pulmonary hypertension, which I do agree with based on the CT scan from August 2022.  However, the most recent CT scan from today shows significant progression in ILD.  Her pattern is inconsistent with UIP to me and there is significant mosaicism and signs of small airway disease suggestive of hypersensitivity pneumonitis.  She did not have a trial of steroids before.  Neither does she have an inhaler or nebulizer treatments attempted.  She does have some down products like a jacket and a comforter, but no other triggers for HP by history.  She denies connective tissue disease symptoms with the exception to dysphagia which seems to be early phase  and has not been worked up before.  She does not have Raynaud's phenomena, arthralgias, muscle stiffness, oral ulcers, epistaxis, sinus symptoms, skin rash, skin ulcers, or hematuria.  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    Review of Systems   Constitutional: Negative for chills, fever and weight loss.   HENT: Negative for ear discharge, ear pain, hearing loss, nosebleeds, sinus pain, sore throat and tinnitus.    Eyes: Negative for double vision, pain and redness.   Respiratory: Positive for cough and shortness of breath. Negative for hemoptysis, sputum production and wheezing.    Cardiovascular: Positive for orthopnea. Negative for chest pain and palpitations.   Genitourinary: Positive for dysuria. Negative for flank pain, hematuria and urgency.   Musculoskeletal: Positive for back pain, myalgias and neck pain.   Skin: Positive for itching. Negative for rash.   Neurological: Negative for dizziness, tingling, tremors, speech change, seizures, loss of consciousness, weakness and headaches.   Endo/Heme/Allergies: Negative for polydipsia.   Psychiatric/Behavioral: Positive for depression. Negative for hallucinations and memory loss. The patient is nervous/anxious. The patient does not have insomnia.         Current Outpatient Medications   Medication     aspirin 325 MG tablet     atorvastatin (LIPITOR) 10 MG tablet     cholecalciferol, vitamin D3, 1,000 unit tablet     coenzyme Q10 200 mg capsule     dorzolamide-timolol (COSOPT) 22.3-6.8 mg/mL ophthalmic solution     fluticasone (FLONASE) 50 mcg/actuation nasal spray     furosemide (LASIX) 20 MG tablet     latanoprost (XALATAN) 0.005 % ophthalmic solution     letrozole (FEMARA) 2.5 mg tablet     metFORMIN (GLUCOPHAGE) 500 MG tablet     metoprolol tartrate (LOPRESSOR) 25 MG tablet     omeprazole (PRILOSEC) 20 MG capsule     sildenafil (REVATIO) 20 MG tablet     Treprostinil 64 MCG POWD     No current  facility-administered medications for this visit.     Allergies   Allergen Reactions     Escitalopram Oxalate [Escitalopram] Unknown     Other reaction(s): fatigue     Sulfa (Sulfonamide Antibiotics) [Sulfa Drugs] Unknown     Sulfamethoxazole-Trimethoprim [Sulfamethoxazole W/Trimethoprim] Unknown     Other reaction(s): Unknown     Past Medical History:   Diagnosis Date     Cancer (H)      Diabetes mellitus (H)     Type 2 Diabetic     Hypertension        Past Surgical History:   Procedure Laterality Date     CV RIGHT HEART CATH MEASUREMENTS RECORDED N/A 8/19/2022    Procedure: Heart Cath Right Heart Cath;  Surgeon: Daniel Sanchez MD;  Location:  HEART CARDIAC CATH LAB     [place] MASTECTOMY,PARTIAL,  WITH AXILLARY LYMPHADENECTOMY Right 4/18/2018    Procedure: Right Lumpectomy after Wire Localization; [place] Lymph Node Biopsy;  Surgeon: Katie Sahu MD;  Location: [facility];  Service: General     TUBAL LIGATION         Social History     Socioeconomic History     Marital status:      Spouse name: Not on file     Number of children: Not on file     Years of education: Not on file     Highest education level: Not on file   Occupational History     Not on file   Tobacco Use     Smoking status: Never     Smokeless tobacco: Never   Substance and Sexual Activity     Alcohol use: Yes     Comment: Alcoholic Drinks/day: rarely     Drug use: No     Sexual activity: Not on file   Other Topics Concern     Not on file   Social History Narrative     Not on file     Social Determinants of Health     Financial Resource Strain: Not on file   Food Insecurity: Not on file   Transportation Needs: Not on file   Physical Activity: Not on file   Stress: Not on file   Social Connections: Not on file   Intimate Partner Violence: Not on file   Housing Stability: Not on file       No family history on file.      Vitals: There were no vitals taken for this visit.    Exam:   GENERAL APPEARANCE: Well developed, well  nourished, alert, and in no apparent distress.  LYMPHATICS: No significant cervical, or supraclavicular nodes.  HEENT: Normal oral mucus membranes, tongue and dentition. Normal nose.   RESP: good air flow throughout.  No crackles. No rhonchi. No wheezes.  CV: Normal S1, S2, regular rhythm, normal rate. No murmur.  No LE edema.   ABD: Soft, lax, nontender.  MS: extremities normal. No clubbing. No cyanosis.  SKIN: no rash on limited exam.  NEURO: Mentation intact, speech normal, normal gait and stance.  PSYCH: mentation appears normal. and affect normal/bright.    Results:  RHC 8/19/22  RA 9/16/9 mmHg  RV 65/11 mmHg  PA 64/20/37 mmHg  CWP 21/22/13 mmHg  CO (Terrance) 1.77 L/min  CI (Terrance) 1.11 L/min/m2  CO (TD) 2.45 L/min  CI (TD) 1.54 L/min/m2   mmHg   SVR 4,338 dynes (by measured Terrance)  PVR 13.56 GRAFF (by measured Terrance)  PVR 9.8 (by TD)     No significant response to vasodilator therapy.     PFTs today 11/23/22  Most Recent [place] Pulmonary Function Testing    FVC-Pred   Date Value Ref Range Status   11/23/2022 2.53 L      FVC-Pre   Date Value Ref Range Status   11/23/2022 1.80 L      FVC-%Pred-Pre   Date Value Ref Range Status   11/23/2022 70 %      FEV1-Pre   Date Value Ref Range Status   11/23/2022 1.59 L      FEV1-%Pred-Pre   Date Value Ref Range Status   11/23/2022 81 %      FEV1FVC-Pred   Date Value Ref Range Status   11/23/2022 78 %      FEV1FVC-Pre   Date Value Ref Range Status   11/23/2022 88 %      No results found for: 20029  FEFMax-Pred   Date Value Ref Range Status   11/23/2022 5.03 L/sec      FEFMax-Pre   Date Value Ref Range Status   11/23/2022 4.99 L/sec      FEFMax-%Pred-Pre   Date Value Ref Range Status   11/23/2022 99 %      ExpTime-Pre   Date Value Ref Range Status   11/23/2022 5.10 sec      FIFMax-Pre   Date Value Ref Range Status   11/23/2022 4.16 L/sec      FEV1FEV6-Pred   Date Value Ref Range Status   11/23/2022 79 %      FEV1FEV6-Pre   Date Value Ref Range Status   11/23/2022 88 %       Interpretation:   Isolated severe reduction in DLCO in keeping with pulmonary vascular disease. No evidence of restriction. No significant change compared to 8/2022.      Chest imaging:  Personally reviewed CTA chest images in 8/2022  Mosaicism suggestive of small airway disease, mild bibasilar subpleural reticulation without honeycombing or traction bronchiectasis.     Personally reviewed HRCT chest images from today:   Significant progression in mosaicism, which is even more pronounced on exhalation images. Stable bibasilar reticular findings. Overall picture could be fitting for subacute hypersensitivity pneumonitis.       Assessment and plan:   1. ILD, favor hypersensitivity pneumonitis (HP)  Patient is 72F with ILD and chronic hypoxic respiratory failure presents to establish care. Her ILD seems to be progressive hypersensitivity pneumonitis pattern, based on mosaicism on CT and aitrapping on exhalation. She is a lifetime nonsmoker which is associated with slight increase in HP incidence, and has some down products. I believe her disease is subacute as her TLC remains intact, although some (pseudonormalization) is possible with airtrapping. I do agree with cardiology that her findings on CT 8/2022 do not explain the degree of pulmonary hypertension she has, and other etiologies are worth investigating.   Plan:   - Remove all down products from environment   - HP 1 & 2 panels   - Complete CTD workup with anticentromere and RNP antibodies given pulmonary hypertension and ILD (scleroderma is in her differential)  - Esophogram for dysphagia   - Prednisone taper, starting from 50 mg po daily (50 mg/kg), and taper down by 10 mg every two weeks  - G6PD level for dapsone therapy (allergic to sulfa)  - Dapsone 100 mg po daily for PJP prophylaxis until prednisone is < 20 mg   - PFTs in 3 months  - HRCT chest in 3 months  - If disease is steroid responsive, we will start MMF at the 3 months visit in addition to ICS  -  RTC in 3 months     2. Precapillary pulmonary hypertension  Receiving ilioprost and sildenafil per PH clinic with symptomatic improvement.     3. Dysphagia  - Esophagram per above     4. Chronic hypoxic respiratory failure  Needing 15 LPM with activity during six minutes walk today   - Portable home O2     5. Osteopenia  Noted on CT chest  - Dexa scan to screen for osteoporosis, and will prescribe alendronate if esophagram ok    I spent 60 minutes reviewing chart, reviewing test results, talking with and examining patient, formulating plan, and documentation on the day of the encounter.          Answers for HPI/ROS submitted by the patient on 11/18/2022  General Symptoms: Yes  Skin Symptoms: Yes  HENT Symptoms: Yes  EYE SYMPTOMS: Yes  HEART SYMPTOMS: Yes  LUNG SYMPTOMS: Yes  INTESTINAL SYMPTOMS: No  URINARY SYMPTOMS: Yes  GYNECOLOGIC SYMPTOMS: No  BREAST SYMPTOMS: No  SKELETAL SYMPTOMS: Yes  BLOOD SYMPTOMS: No  NERVOUS SYSTEM SYMPTOMS: Yes  MENTAL HEALTH SYMPTOMS: Yes  Congestion: Yes  Trouble swallowing: Yes   Voice hoarseness: No  Mouth sores: Yes  Tooth pain: Yes  Gum tenderness: Yes  Bleeding gums: Yes  Change in taste: No  Change in sense of smell: No  Dry mouth: Yes  Hearing aid used: No  Neck lump: No  Loss of appetite: Yes  Weight gain: No  Fatigue: Yes  Night sweats: No  Increased stress: Yes  Excessive hunger: No  Feeling hot or cold when others believe the temperature is normal: No  Loss of height: No  Post-operative complications: No  Surgical site pain: No  Change in or Loss of Energy: Yes  Hyperactivity: No  Confusion: No  Changes in hair: Yes  Changes in moles/birth marks: No  Changes in nails: No  Acne: No  Hair in places you don't want it: No  Change in facial hair: No  Warts: No  Non-healing sores: No  Scarring: No  Flaking of skin: Yes  Color changes of hands/feet in cold : Yes  Sun sensitivity: No  Skin thickening: No  Vision loss: No  Dry eyes: Yes  Watery eyes: No  Eye bulging: No  Flashing of  lights: No  Spots: No  Floaters: No  Crossed eyes: No  Tunnel Vision: No  Yellowing of eyes: No  Eye irritation: No  Pain in legs with walking: No  Fingers or toes appear blue: No  High blood pressure: No  Low blood pressure: Yes  Fainting: No  Murmurs: No  Pacemaker: No  Varicose veins: No  Edema or swelling: No  Wake up at night with shortness of breath: No  Light-headedness: Yes  Exercise intolerance: Yes  Snoring: No  Difficulty breathing on exertion: Yes  Nighttime Cough: No  Difficulty breathing when lying flat: Yes  Trouble holding urine or incontinence: Yes  Increased frequency of urination: No  Decreased frequency of urination: No  Frequent nighttime urination: No  Difficulty emptying bladder: No  Swollen joints: No  Joint pain: No  Bone pain: No  Muscle cramps: Yes  Muscle weakness: Yes  Joint stiffness: No  Bone fracture: No  Trouble with coordination: No  Difficulty walking: Yes  Paralysis: No  Numbness: No  Trouble thinking or concentrating: No  Mood changes: No  Panic attacks: No          Again, thank you for allowing me to participate in the care of your patient.        Sincerely,        Shannan Rodriguez MD

## 2022-11-23 NOTE — NURSING NOTE
Spoke with patient after new consult visit with provider to introduce role as nurse care coordinator and provide direct phone number for future questions or concerns related to their care received in ILD Clinic. Also provided contact information for ILD Clinic Navigator for use related to scheduling needs.      Discussed oxygen, testing today showed she needs 15 LPM with activity, current DME ([employer]) for oxygen equipment. Plan to send new order to switch from POC to tanks and get higher liter home concentrator.      ILD Nurse Care Coordinator  [phone]

## 2022-11-25 ENCOUNTER — TELEPHONE (OUTPATIENT)
Dept: PULMONOLOGY | Facility: CLINIC | Age: 72
End: 2022-11-25

## 2022-11-25 LAB
CENTROMERE B AB SER-ACNC: <0.7 U/ML
CENTROMERE B AB SER-ACNC: NEGATIVE
G6PD RBC-CCNT: 18.3 U/G HB

## 2022-11-25 NOTE — TELEPHONE ENCOUNTER
Received call from Ella at [facility] that they will be supplying patient with liquid O2/high flow. Ella spoke with pt's niece who will be there for intake. If further questions Ella can be reached at [phone].  Vandana Edward, RN BSN

## 2022-11-26 DIAGNOSIS — J67.9 HYPERSENSITIVITY PNEUMONIA (H): ICD-10-CM

## 2022-11-27 LAB — RNAP III IGG SERPL IA-ACNC: 4 UNITS

## 2022-11-27 NOTE — PROGRESS NOTES
"Patty is a 72 year old who is being evaluated via a billable telephone visit.      What phone number would you like to be contacted at? [phone]  How would you like to obtain your AVS? Tatahart    Review Of Systems  Skin: NEGATIVE  Respiratory: SOB on exertion  Cardiovascular: Edema  Gastrointestinal: NEGATIVE  Genitourinary:NEGATIVE    Vitals - Patient Reported  Systolic (Patient Reported): 97  Diastolic (Patient Reported): 54  Weight (Patient Reported): 58.1 kg (128 lb)  Height (Patient Reported): 157.5 cm (5' 2\")  BMI (Based on Pt Reported Ht/Wt): 23.41  Pulse (Patient Reported): 90    Telephone number of patient: [phone]    Danyelle Lund LPN            CARDIOLOGY PH CLINIC TELEPHONE VISIT    Date of virtual visit: 11/28/22        Patty Soliman is a 72 year old female who is being evaluated via a billable telephone visit.        I have reviewed and updated the patient's Past Medical History, Social History, Family History and Medication List.    MEDICATIONS:  Current Outpatient Medications   Medication Sig Dispense Refill     aspirin 325 MG tablet [ASPIRIN 325 MG TABLET] daily.       atorvastatin (LIPITOR) 10 MG tablet [ATORVASTATIN (LIPITOR) 10 MG TABLET] daily.       cholecalciferol, vitamin D3, 1,000 unit tablet [CHOLECALCIFEROL, VITAMIN D3, 1,000 UNIT TABLET] Take 2,000 Units by mouth daily.       coenzyme Q10 200 mg capsule [COENZYME Q10 200 MG CAPSULE] Take 200 mg by mouth daily.       dapsone (ACZONE) 100 MG tablet Take 1 tablet (100 mg) by mouth daily 30 tablet 1     dorzolamide-timolol (COSOPT) 22.3-6.8 mg/mL ophthalmic solution [DORZOLAMIDE-TIMOLOL (COSOPT) 22.3-6.8 MG/ML OPHTHALMIC SOLUTION] 1 gtt       fluticasone (FLONASE) 50 mcg/actuation nasal spray [FLUTICASONE (FLONASE) 50 MCG/ACTUATION NASAL SPRAY] as needed.       furosemide (LASIX) 20 MG tablet Take 1 tablet (20 mg) by mouth daily 90 tablet 3     latanoprost (XALATAN) 0.005 % ophthalmic solution [LATANOPROST (XALATAN) 0.005 % OPHTHALMIC " SOLUTION] Administer 1 drop to both eyes daily.        letrozole (FEMARA) 2.5 mg tablet [LETROZOLE (FEMARA) 2.5 MG TABLET] Take 1 tablet by mouth daily.       metFORMIN (GLUCOPHAGE) 500 MG tablet [METFORMIN (GLUCOPHAGE) 500 MG TABLET] Take 1,000 mg by mouth daily with breakfast.        metoprolol tartrate (LOPRESSOR) 25 MG tablet [METOPROLOL TARTRATE (LOPRESSOR) 25 MG TABLET] Take 25 mg by mouth daily.        omeprazole (PRILOSEC) 20 MG capsule [OMEPRAZOLE (PRILOSEC) 20 MG CAPSULE] Take 20 mg by mouth daily before breakfast.        predniSONE (DELTASONE) 5 MG tablet Take 10 tablets (50 mg) by mouth daily for 14 days, THEN 8 tablets (40 mg) daily for 14 days, THEN 6 tablets (30 mg) daily for 14 days, THEN 4 tablets (20 mg) daily for 14 days, THEN 2 tablets (10 mg) daily for 14 days, THEN 1 tablet (5 mg) daily for 30 days. 450 tablet 0     sildenafil (REVATIO) 20 MG tablet Take 1 tablet (20 mg) by mouth 3 times daily (Patient taking differently: Take 20 mg by mouth 2 times daily) 90 tablet 11     Treprostinil 64 MCG POWD Inhale 64 mcg into the lungs 4 times daily         ALLERGIES  Escitalopram oxalate [escitalopram], Sulfa (sulfonamide antibiotics) [sulfa drugs], and Sulfamethoxazole-trimethoprim [sulfamethoxazole w/trimethoprim]      Primary PH cardiologist: Dr. Sanchez      HPI:  Ms. Soliman is a pleasant 72 year old female with a PMHx including DM, HTN, dyslipidemia, CKD, and progressive BOYER. Workup revealed RV dilation and RV dysfunction via echocardiogram. 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 Additionally, this summer she was diagnosed with pulmonary fibrosis. She was then referred to see Dr. Sanchez in August, and underwent a RHC which showed moderate PAH with reduced cardiac output. At that time, it was recommended she start inhaled Tyvaso DPI, and was started on digoxin for RV support. Plan was to also add sildenafil down the road. Additionally, she was referred  "to our ILD clinic.     When I met with Patty virtually earlier this month, she was on Tyvaso 64mcg QID and was tolerating well with the exception of a cough, though this was chronic and not worsened. She had reported having bradycardia and her digoxin had recently been discontinued. However, a family member (Summer) who accompanied her our visit relayed that she rarely sees her with heart rates under 110 at home. At that visit, we discussed adding sildenafil, and I asked her to return for follow up to further assess her heart rates.     Today, we are meeting back, though unfortunately this visit was scheduled again virtually. She reports that she has been on sildenafil 20mg TID for a few weeks; her BP was running quite low, so we discontinued the dyazide. She was asked to check her BP twice daily at home but she misunderstood and instead dropped her sildenafil to BID instead of TID. With this change, BP sounds to still be marginal. However, she reports that overall she is starting to feel better. She met with Dr. Bosch a few weeks ago and her oxygen was increased to 8-10L at home. With this, dizziness is improved and her sats are mostly in the 90s though does relay that they still drop with exertion. She believes that the Tyvaso and sildenafil have been helpful, as she believes she is recovering more quickly.     There are still some discrepancies with her heart rates. She reports being concerned that it is going \"too low\" at times, but reports most checks between  when she checks her sats. She reports having some worsening LE edema after thanksgiving but improved after taking her Lasix daily for a few days.     The patient did not have any new labs performed for our visit today, but most recent available labs were reviewed as below.       CURRENT PULMONARY HYPERTENSION REGIMEN:    PAH Rx: Tyvaso 64mcg DPI QID, sildenafil 20mg BID (reduced from TID due to hypotension)    Diuretics: Lasix 20mg every other " day    Oxygen: 8-10L NC    Anticoagulation: None    Pulm: Dr. Rodriguez       Assessment/Plan:    1. Pulmonary hypertension.   --Ms. Soliman has moderate to severe PAH with evidence of RV dysfunction. She is now on Tyvaso DPI 64mcg QID and tolerating well. We added sildenafil last visit; she developed some hypotension with this, and I discontinued her Dyazide. She also misunderstood some instructions and reduced her sildenafil from 20mg TID to BID. As her BP sounds to still be marginal and she is feeling better, will keep as is for now. Unfortunately, her REVEAL risk score is high for adverse outcomes at 12, and she is not a candidate for lung transplantation because of her age.   --She was initially placed on digoxin for RV support, but this had to be stopped due to self reported bradycardia/dizziness. I am still unclear on her heart rates. To clarify, will place a repeat 5 day Ziopatch to sort out further.    --As today is a virtual visit I cannot fully assess her volume status. She thinks she is doing ok Lasix 20mg every other day though did transiently increase after Thanksgiving. I also note that her K was low on labs last week. Will ask her to increase Lasix to 20mg every day and add Kdur at 40meq daily. Recheck BMP in 1 week locally.    --She has recently established in our ILD clinic with Dr. Rodriguez. She was placed on a prednisone taper and repeat imaging and PFTs are planned in a few months. It also sounds as if her dizziness is improved now on higher oxygen flow.        Follow up plan: Labs locally in 1 week and place Ziopatch. Then return to see me in clinic in 6 weeks with repeat labs, and to review testing. We are happy to see the patient back sooner with any new concerns.       Testing/labs:    Most recent labs:    Latest Reference Range & Units 11/23/22 08:50   Sodium 136 - 145 mmol/L 141   Potassium 3.4 - 5.3 mmol/L 3.2 (L)   Chloride 98 - 107 mmol/L 98   Carbon Dioxide (CO2) 22 - 29 mmol/L 25   Urea  Nitrogen 8.0 - 23.0 mg/dL 27.5 (H)   Creatinine 0.51 - 0.95 mg/dL 1.27 (H)   GFR Estimate >60 mL/min/1.73m2 44 (L)   Calcium 8.8 - 10.2 mg/dL 10.1   Anion Gap 7 - 15 mmol/L 18 (H)   Albumin 3.5 - 5.2 g/dL 3.8   Protein Total 6.4 - 8.3 g/dL 7.1   Alkaline Phosphatase 35 - 104 U/L 176 (H)   ALT 10 - 35 U/L 16   AST 10 - 35 U/L 27   Bilirubin Total <=1.2 mg/dL 0.6   Centromere Antibody IgG Negative  Negative   CRP Inflammation <5.00 mg/L 6.77 (H)   Glucose 70 - 99 mg/dL 171 (H)   Glucose-6-PO4 Dehydrogenase 9.9 - 16.6 U/g Hb 18.3 (H)   N-Terminal Pro Bnp 0 - 900 pg/mL 13,724 (H)   WBC 4.0 - 11.0 10e3/uL 13.6 (H)   Hemoglobin 11.7 - 15.7 g/dL 13.4   Hematocrit 35.0 - 47.0 % 42.5   (L): Data is abnormally low  (H): Data is abnormally high    Other recent pertinent testing:    Echo 8/1/22  Interpretation Summary  Left ventricular function is normal.The ejection fraction is 60-65%.  Paradoxical septal motion consistent with right ventricular pressure and  volume overload is present.  Moderate to severe right ventricular dilation is present. The RV function is  moderately to severely reduced. The RVFAC is 30%, the TAPSE is 1.6 cm, and the  RV S' is 6 cm/s.  There is early shunting of saline on the bubble study. This is consistent with  an intracardiac shunt, likely due to a patent foramen ovale.  Moderate to severe tricuspid insufficiency is present.  The estimated PA systolic pressure is at least 92 mmHg.  There is mild dilation of the ascending aorta (3.7 cm, indexed value 2.31  cm/m2).  IVC diameter >2.1 cm collapsing <50% with sniff suggests a high RA pressure  estimated at 15 mmHg or greater.  No pericardial effusion is present.  There is no prior study for direct comparison.    [facility] 8/19/22  Hemodynamics    Baseline Hemodynamics:  RA 9/16/9 mmHg  RV 65/11 mmHg  PA 64/20/37 mmHg  CWP 21/22/13 mmHg  CO (Terrance) 1.77 L/min  CI (Terrance) 1.11 L/min/m2  CO (TD) 2.45 L/min  CI (TD) 1.54 L/min/m2   mmHg   SVR 4,338 dynes  (by measured Terrance)  PVR 13.56 GRAFF (by measured Terrance)  PVR 9.8 (by TD)    During Nitric Oxide:  RA 12/8/7 mmHg  PA 49/17/30 mmHg  CWP 4/4/4 mmHg  CO (TD) 3.4 L/min  CI (TD) 2.13 L/min/m2  Calculated cardiac output and index not included as the baseline hemodynamics were performed using measured Terrance.    mmHg   SVR 2,188 dynes  PVR 7.64 GRAFF    [place] saturations:   SVC: 59%  IVC: 72% (syringe in contact with room air prior to processing)   RA: 56%  RV: 55%  PA: 56%     Right sided filling pressures are mildly elevated. Left sided filling pressures are mildly elevated. Moderately elevated pulmonary artery hypertension. Reduced cardiac output level.       NYHA Functional Class:  3      Phone Visit Details    Start time: 0943  End time: 0952    A total of 20 minutes was spent today performing chart and history review, pre and post visit documentation, and care coordination.      Janet Duke PA-C  [facility]  Pager [phone]

## 2022-11-28 ENCOUNTER — VIRTUAL VISIT (OUTPATIENT)
Dept: CARDIOLOGY | Facility: CLINIC | Age: 72
End: 2022-11-28
Payer: MEDICARE

## 2022-11-28 DIAGNOSIS — I27.20 PULMONARY HYPERTENSION (H): Primary | ICD-10-CM

## 2022-11-28 DIAGNOSIS — R06.09 DOE (DYSPNEA ON EXERTION): ICD-10-CM

## 2022-11-28 DIAGNOSIS — E87.6 HYPOKALEMIA: ICD-10-CM

## 2022-11-28 DIAGNOSIS — R06.00 DYSPNEA, UNSPECIFIED: ICD-10-CM

## 2022-11-28 LAB
DLCOCOR-%PRED-PRE: 30 %
DLCOCOR-PRE: 5.46 ML/MIN/MMHG
DLCOUNC-%PRED-PRE: 30 %
DLCOUNC-PRE: 5.46 ML/MIN/MMHG
DLCOUNC-PRED: 17.91 ML/MIN/MMHG
ERV-%PRED-PRE: 92 %
ERV-PRE: 0.63 L
ERV-PRED: 0.68 L
EXPTIME-PRE: 5.1 SEC
FEF2575-%PRED-PRE: 128 %
FEF2575-PRE: 2.13 L/SEC
FEF2575-PRED: 1.66 L/SEC
FEFMAX-%PRED-PRE: 99 %
FEFMAX-PRE: 4.99 L/SEC
FEFMAX-PRED: 5.03 L/SEC
FEV1-%PRED-PRE: 81 %
FEV1-PRE: 1.59 L
FEV1FEV6-PRE: 88 %
FEV1FEV6-PRED: 79 %
FEV1FVC-PRE: 88 %
FEV1FVC-PRED: 78 %
FEV1SVC-PRE: 87 %
FEV1SVC-PRED: 72 %
FIFMAX-PRE: 4.16 L/SEC
FRCPLETH-%PRED-PRE: 91 %
FRCPLETH-PRE: 2.39 L
FRCPLETH-PRED: 2.6 L
FVC-%PRED-PRE: 70 %
FVC-PRE: 1.8 L
FVC-PRED: 2.53 L
IC-%PRED-PRE: 58 %
IC-PRE: 1.19 L
IC-PRED: 2.05 L
RVPLETH-%PRED-PRE: 86 %
RVPLETH-PRE: 1.76 L
RVPLETH-PRED: 2.03 L
TLCPLETH-%PRED-PRE: 77 %
TLCPLETH-PRE: 3.59 L
TLCPLETH-PRED: 4.6 L
VA-%PRED-PRE: 65 %
VA-PRE: 2.83 L
VC-%PRED-PRE: 66 %
VC-PRE: 1.82 L
VC-PRED: 2.72 L

## 2022-11-28 PROCEDURE — 99442 PR PHYSICIAN TELEPHONE EVALUATION 11-20 MIN: CPT | Mod: 95 | Performed by: PHYSICIAN ASSISTANT

## 2022-11-28 RX ORDER — PREDNISONE 5 MG/1
TABLET ORAL
Qty: 450 TABLET | Refills: 0 | Status: SHIPPED | OUTPATIENT
Start: 2022-11-28 | End: 2023-03-06

## 2022-11-28 RX ORDER — POTASSIUM CHLORIDE 1500 MG/1
40 TABLET, EXTENDED RELEASE ORAL 2 TIMES DAILY
Qty: 180 TABLET | Refills: 3 | Status: SHIPPED | OUTPATIENT
Start: 2022-11-28 | End: 2024-02-16

## 2022-11-28 NOTE — NURSING NOTE
Patient called and discussed AVS  from via virtual visit with Janet Duke  Discussed the following plan:     Increased furosemide to 20mg daily  Start potassium 40meq daily  Repeat labs in 1 week. Lab orders faxed to [facility]  Zio monitor 7 day. Patient okay with this being mailed out  Scheduled for a 6 week follow-up with labs prior       Patient to be called for scheduling after virtual visit by      Patient had no questions with discussion and agreeable to call for any new concerns or questions.

## 2022-11-28 NOTE — PATIENT INSTRUCTIONS
Thank you for visiting the Pulmonary Hypertension Clinic virtually today.      Today we discussed:   We will increase Lasix to 20mg EVERY day.  We will start potassium supplements at 40meq daily as your potassium levels were low on your labs last week.    Check some labs locally in 1 week.  We will also place a 5 day Ziopatch (heart patch) to help sort out your heart rates and rhythm at that time.       Follow up Appointment Information:  Return to see Janet in 6 weeks in clinic in person, to review results.       Additional Instructions:    1. Continue staying active and eat a heart healthy, low sodium diet.     2. Please keep current list of medications with you at all times.     3. Remember to weigh yourself daily after voiding and write it down on a log. If you have gained/lost 2 pounds overnight or 5 pounds in a week contact us for medication adjustments or further instructions.    4. Please call us immediately if you have syncope (fainting or passing out), chest pain, worsening edema (swelling or weight gain), or general worsening in how you are feeling.       -----------------------------------------------------------------------------------------------------------------    If you have questions or concerns please contact us at:    Kelli Hager RN, BSN   Jackelyn Golden (Schedule,Prior Auth)  Nurse Coordinator      Clinic   Pulmonary Hypertension    Pulmonary Hypertension  [facility]              [facility]  (P)[phone]     (P) [phone]         (F)[phone]                ** Please note that you will NOT receive a reminder call regarding your scheduled testing, reminder calls are for provider appointments only.  If you are scheduled for testing within the [employer] system you may receive a call regarding pre-registration for billing purposes only.**      --------------------------------------------------------------------------------------------------------------    Interested in joining a support group?    Pulmonary Hypertension Association  Https://www.phassociation.org/  **Look at the Events Tab** They even have Support Groups that you can call into    [facility] Support Group  Second Saturday of the Month from 1-3 PM   Location: [address] [place] [id number] (Currently Virtual)  Leader: Reina French   Phone: [phone]   Email: [email or web address]     Great Videos about Pulmonary Hypertension!!  Scan ME!    Website: [email or web address]

## 2022-11-28 NOTE — LETTER
"11/28/2022    Meg Kahn MD  [address] [facility] [address]  [facility] [address]    RE: Patty Soliman       Dear Colleague,     I had the pleasure of seeing Patty Soliman in the [facility].  Patty is a 72 year old who is being evaluated via a billable telephone visit.      What phone number would you like to be contacted at? [phone]  How would you like to obtain your AVS? MyChart    Review Of Systems  Skin: NEGATIVE  Respiratory: SOB on exertion  Cardiovascular: Edema  Gastrointestinal: NEGATIVE  Genitourinary:NEGATIVE    Vitals - Patient Reported  Systolic (Patient Reported): 97  Diastolic (Patient Reported): 54  Weight (Patient Reported): 58.1 kg (128 lb)  Height (Patient Reported): 157.5 cm (5' 2\")  BMI (Based on Pt Reported Ht/Wt): 23.41  Pulse (Patient Reported): 90    Telephone number of patient: [phone]    Danyelle Lund LPN            CARDIOLOGY  CLINIC TELEPHONE VISIT    Date of virtual visit: 11/28/22        Patty Soliman is a 72 year old female who is being evaluated via a billable telephone visit.        I have reviewed and updated the patient's Past Medical History, Social History, Family History and Medication List.    MEDICATIONS:  Current Outpatient Medications   Medication Sig Dispense Refill     aspirin 325 MG tablet [ASPIRIN 325 MG TABLET] daily.       atorvastatin (LIPITOR) 10 MG tablet [ATORVASTATIN (LIPITOR) 10 MG TABLET] daily.       cholecalciferol, vitamin D3, 1,000 unit tablet [CHOLECALCIFEROL, VITAMIN D3, 1,000 UNIT TABLET] Take 2,000 Units by mouth daily.       coenzyme Q10 200 mg capsule [COENZYME Q10 200 MG CAPSULE] Take 200 mg by mouth daily.       dapsone (ACZONE) 100 MG tablet Take 1 tablet (100 mg) by mouth daily 30 tablet 1     dorzolamide-timolol (COSOPT) 22.3-6.8 mg/mL ophthalmic solution [DORZOLAMIDE-TIMOLOL (COSOPT) 22.3-6.8 MG/ML OPHTHALMIC SOLUTION] 1 gtt       fluticasone (FLONASE) 50 mcg/actuation nasal spray [FLUTICASONE (FLONASE) 50 " MCG/ACTUATION NASAL SPRAY] as needed.       furosemide (LASIX) 20 MG tablet Take 1 tablet (20 mg) by mouth daily 90 tablet 3     latanoprost (XALATAN) 0.005 % ophthalmic solution [LATANOPROST (XALATAN) 0.005 % OPHTHALMIC SOLUTION] Administer 1 drop to both eyes daily.        letrozole (FEMARA) 2.5 mg tablet [LETROZOLE (FEMARA) 2.5 MG TABLET] Take 1 tablet by mouth daily.       metFORMIN (GLUCOPHAGE) 500 MG tablet [METFORMIN (GLUCOPHAGE) 500 MG TABLET] Take 1,000 mg by mouth daily with breakfast.        metoprolol tartrate (LOPRESSOR) 25 MG tablet [METOPROLOL TARTRATE (LOPRESSOR) 25 MG TABLET] Take 25 mg by mouth daily.        omeprazole (PRILOSEC) 20 MG capsule [OMEPRAZOLE (PRILOSEC) 20 MG CAPSULE] Take 20 mg by mouth daily before breakfast.        predniSONE (DELTASONE) 5 MG tablet Take 10 tablets (50 mg) by mouth daily for 14 days, THEN 8 tablets (40 mg) daily for 14 days, THEN 6 tablets (30 mg) daily for 14 days, THEN 4 tablets (20 mg) daily for 14 days, THEN 2 tablets (10 mg) daily for 14 days, THEN 1 tablet (5 mg) daily for 30 days. 450 tablet 0     sildenafil (REVATIO) 20 MG tablet Take 1 tablet (20 mg) by mouth 3 times daily (Patient taking differently: Take 20 mg by mouth 2 times daily) 90 tablet 11     Treprostinil 64 MCG POWD Inhale 64 mcg into the lungs 4 times daily         ALLERGIES  Escitalopram oxalate [escitalopram], Sulfa (sulfonamide antibiotics) [sulfa drugs], and Sulfamethoxazole-trimethoprim [sulfamethoxazole w/trimethoprim]      Primary PH cardiologist: Dr. Sanchez      HPI:  Ms. Soliman is a pleasant 72 year old female with a PMHx including DM, HTN, dyslipidemia, CKD, and progressive BOYER. Workup revealed RV dilation and RV dysfunction via echocardiogram. 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 Additionally, this summer she was diagnosed with pulmonary fibrosis. She was then referred to see Dr. Sanchez in August, and underwent a RHC which showed  "moderate PAH with reduced cardiac output. At that time, it was recommended she start inhaled Tyvaso DPI, and was started on digoxin for RV support. Plan was to also add sildenafil down the road. Additionally, she was referred to our ILD clinic.     When I met with Patty manuel earlier this month, she was on Tyvaso 64mcg QID and was tolerating well with the exception of a cough, though this was chronic and not worsened. She had reported having bradycardia and her digoxin had recently been discontinued. However, a family member (Summer) who accompanied her our visit relayed that she rarely sees her with heart rates under 110 at home. At that visit, we discussed adding sildenafil, and I asked her to return for follow up to further assess her heart rates.     Today, we are meeting back, though unfortunately this visit was scheduled again virtually. She reports that she has been on sildenafil 20mg TID for a few weeks; her BP was running quite low, so we discontinued the dyazide. She was asked to check her BP twice daily at home but she misunderstood and instead dropped her sildenafil to BID instead of TID. With this change, BP sounds to still be marginal. However, she reports that overall she is starting to feel better. She met with Dr. Bosch a few weeks ago and her oxygen was increased to 8-10L at home. With this, dizziness is improved and her sats are mostly in the 90s though does relay that they still drop with exertion. She believes that the Tyvaso and sildenafil have been helpful, as she believes she is recovering more quickly.     There are still some discrepancies with her heart rates. She reports being concerned that it is going \"too low\" at times, but reports most checks between  when she checks her sats. She reports having some worsening LE edema after thanksgiving but improved after taking her Lasix daily for a few days.     The patient did not have any new labs performed for our visit today, but " most recent available labs were reviewed as below.       CURRENT PULMONARY HYPERTENSION REGIMEN:    PAH Rx: Tyvaso 64mcg DPI QID, sildenafil 20mg BID (reduced from TID due to hypotension)    Diuretics: Lasix 20mg every other day    Oxygen: 8-10L NC    Anticoagulation: None    Pulm: Dr. Rodriguez       Assessment/Plan:    1. Pulmonary hypertension.   --Ms. Soliman has moderate to severe PAH with evidence of RV dysfunction. She is now on Tyvaso DPI 64mcg QID and tolerating well. We added sildenafil last visit; she developed some hypotension with this, and I discontinued her Dyazide. She also misunderstood some instructions and reduced her sildenafil from 20mg TID to BID. As her BP sounds to still be marginal and she is feeling better, will keep as is for now. Unfortunately, her REVEAL risk score is high for adverse outcomes at 12, and she is not a candidate for lung transplantation because of her age.   --She was initially placed on digoxin for RV support, but this had to be stopped due to self reported bradycardia/dizziness. I am still unclear on her heart rates. To clarify, will place a repeat 5 day Ziopatch to sort out further.    --As today is a virtual visit I cannot fully assess her volume status. She thinks she is doing ok Lasix 20mg every other day though did transiently increase after Thanksgiving. I also note that her K was low on labs last week. Will ask her to increase Lasix to 20mg every day and add Kdur at 40meq daily. Recheck BMP in 1 week locally.    --She has recently established in our ILD clinic with Dr. Rodriguez. She was placed on a prednisone taper and repeat imaging and PFTs are planned in a few months. It also sounds as if her dizziness is improved now on higher oxygen flow.        Follow up plan: Labs locally in 1 week and place Ziopatch. Then return to see me in clinic in 6 weeks with repeat labs, and to review testing. We are happy to see the patient back sooner with any new concerns.        Testing/labs:    Most recent labs:    Latest Reference Range & Units 11/23/22 08:50   Sodium 136 - 145 mmol/L 141   Potassium 3.4 - 5.3 mmol/L 3.2 (L)   Chloride 98 - 107 mmol/L 98   Carbon Dioxide (CO2) 22 - 29 mmol/L 25   Urea Nitrogen 8.0 - 23.0 mg/dL 27.5 (H)   Creatinine 0.51 - 0.95 mg/dL 1.27 (H)   GFR Estimate >60 mL/min/1.73m2 44 (L)   Calcium 8.8 - 10.2 mg/dL 10.1   Anion Gap 7 - 15 mmol/L 18 (H)   Albumin 3.5 - 5.2 g/dL 3.8   Protein Total 6.4 - 8.3 g/dL 7.1   Alkaline Phosphatase 35 - 104 U/L 176 (H)   ALT 10 - 35 U/L 16   AST 10 - 35 U/L 27   Bilirubin Total <=1.2 mg/dL 0.6   Centromere Antibody IgG Negative  Negative   CRP Inflammation <5.00 mg/L 6.77 (H)   Glucose 70 - 99 mg/dL 171 (H)   Glucose-6-PO4 Dehydrogenase 9.9 - 16.6 U/g Hb 18.3 (H)   N-Terminal Pro Bnp 0 - 900 pg/mL 13,724 (H)   WBC 4.0 - 11.0 10e3/uL 13.6 (H)   Hemoglobin 11.7 - 15.7 g/dL 13.4   Hematocrit 35.0 - 47.0 % 42.5   (L): Data is abnormally low  (H): Data is abnormally high    Other recent pertinent testing:    Echo 8/1/22  Interpretation Summary  Left ventricular function is normal.The ejection fraction is 60-65%.  Paradoxical septal motion consistent with right ventricular pressure and  volume overload is present.  Moderate to severe right ventricular dilation is present. The RV function is  moderately to severely reduced. The RVFAC is 30%, the TAPSE is 1.6 cm, and the  RV S' is 6 cm/s.  There is early shunting of saline on the bubble study. This is consistent with  an intracardiac shunt, likely due to a patent foramen ovale.  Moderate to severe tricuspid insufficiency is present.  The estimated PA systolic pressure is at least 92 mmHg.  There is mild dilation of the ascending aorta (3.7 cm, indexed value 2.31  cm/m2).  IVC diameter >2.1 cm collapsing <50% with sniff suggests a high RA pressure  estimated at 15 mmHg or greater.  No pericardial effusion is present.  There is no prior study for direct comparison.    RHC  8/19/22  Hemodynamics    Baseline Hemodynamics:  RA 9/16/9 mmHg  RV 65/11 mmHg  PA 64/20/37 mmHg  CWP 21/22/13 mmHg  CO (Terrance) 1.77 L/min  CI (Terrance) 1.11 L/min/m2  CO (TD) 2.45 L/min  CI (TD) 1.54 L/min/m2   mmHg   SVR 4,338 dynes (by measured Terrance)  PVR 13.56 GRAFF (by measured Terrance)  PVR 9.8 (by TD)    During Nitric Oxide:  RA 12/8/7 mmHg  PA 49/17/30 mmHg  CWP 4/4/4 mmHg  CO (TD) 3.4 L/min  CI (TD) 2.13 L/min/m2  Calculated cardiac output and index not included as the baseline hemodynamics were performed using measured Terrance.    mmHg   SVR 2,188 dynes  PVR 7.64 GRAFF    [place] saturations:   SVC: 59%  IVC: 72% (syringe in contact with room air prior to processing)   RA: 56%  RV: 55%  PA: 56%     Right sided filling pressures are mildly elevated. Left sided filling pressures are mildly elevated. Moderately elevated pulmonary artery hypertension. Reduced cardiac output level.       NYHA Functional Class:  3      Phone Visit Details    Start time: 0943  End time: 0952    A total of 20 minutes was spent today performing chart and history review, pre and post visit documentation, and care coordination.      Janet Duke PA-C  [facility]  Pager [phone]    Thank you for allowing me to participate in the care of your patient.      Sincerely,     JYOTSNA Avalos     [facility]  cc:   JYOTSNA Avalos  [facility]  [address] [place] [id number]

## 2022-11-29 LAB
A FLAVUS AB SER QL ID: NORMAL
A FUMIGATUS1 AB SER QL ID: NORMAL
A FUMIGATUS2 AB SER QL ID: NORMAL
A FUMIGATUS3 AB SER QL ID: NORMAL
A FUMIGATUS6 AB SER QL ID: NORMAL
A PULLULANS AB SER QL ID: NORMAL
PIGEON SERUM AB QL ID: NORMAL
S RECTIVIRGULA AB SER QL ID: NORMAL
S VIRIDIS AB SER QL ID: NORMAL
T CANDIDUS AB SER QL: NORMAL
T VULGARIS1 AB SER QL ID: NORMAL

## 2022-11-30 DIAGNOSIS — Z79.52 LONG TERM SYSTEMIC STEROID USER: Primary | ICD-10-CM

## 2022-11-30 NOTE — TELEPHONE ENCOUNTER
Spoke with patient and reviewed need for Dexa scan. Pt verbalized understanding and was in agreement with moving forward. RN sent to get pt scheduled.   Vandana Edward RN BSN

## 2022-12-02 DIAGNOSIS — I27.20 PULMONARY HYPERTENSION (H): ICD-10-CM

## 2022-12-06 ENCOUNTER — ALLIED HEALTH/NURSE VISIT (OUTPATIENT)
Dept: CARDIOLOGY | Facility: CLINIC | Age: 72
End: 2022-12-06
Payer: MEDICARE

## 2022-12-06 DIAGNOSIS — R06.00 DYSPNEA, UNSPECIFIED: ICD-10-CM

## 2022-12-06 RX ORDER — SILDENAFIL CITRATE 20 MG/1
TABLET ORAL
Qty: 90 TABLET | Refills: 11 | OUTPATIENT
Start: 2022-12-06

## 2022-12-09 ENCOUNTER — LAB REQUISITION (OUTPATIENT)
Dept: LAB | Facility: CLINIC | Age: 72
End: 2022-12-09
Payer: MEDICARE

## 2022-12-09 DIAGNOSIS — R06.09 OTHER FORMS OF DYSPNEA: ICD-10-CM

## 2022-12-09 DIAGNOSIS — I27.20 PULMONARY HYPERTENSION, UNSPECIFIED (H): ICD-10-CM

## 2022-12-09 LAB
ANION GAP SERPL CALCULATED.3IONS-SCNC: 20 MMOL/L (ref 7–15)
BUN SERPL-MCNC: 23.1 MG/DL (ref 8–23)
CALCIUM SERPL-MCNC: 9.3 MG/DL (ref 8.8–10.2)
CHLORIDE SERPL-SCNC: 94 MMOL/L (ref 98–107)
CREAT SERPL-MCNC: 0.98 MG/DL (ref 0.51–0.95)
DEPRECATED HCO3 PLAS-SCNC: 24 MMOL/L (ref 22–29)
GFR SERPL CREATININE-BSD FRML MDRD: 60 ML/MIN/1.73M2
GLUCOSE SERPL-MCNC: 301 MG/DL (ref 70–99)
POTASSIUM SERPL-SCNC: 4 MMOL/L (ref 3.4–5.3)
SODIUM SERPL-SCNC: 138 MMOL/L (ref 136–145)

## 2022-12-09 PROCEDURE — 80048 BASIC METABOLIC PNL TOTAL CA: CPT | Mod: ORL | Performed by: FAMILY MEDICINE

## 2022-12-14 ENCOUNTER — TELEPHONE (OUTPATIENT)
Dept: CARDIOLOGY | Facility: CLINIC | Age: 72
End: 2022-12-14

## 2022-12-14 PROCEDURE — 93244 EXT ECG>48HR<7D REV&INTERPJ: CPT | Performed by: INTERNAL MEDICINE

## 2022-12-14 NOTE — TELEPHONE ENCOUNTER
Zio monitor sent out last week she will apply today 12/14  I scheduled a follow-up in Jan to go over results. Pt started on lasix 20mg hester and potassium 40meq daily on 11/28. Does not report much change in her BLE swelling, she does not weigh often and reported her weight today 131lb. She does wear compression stockings. Reports the swelling is not too bothersome however has discomfort in her shoes. Repeat labs completed on 12/5.     Updated provider via message to follow-up for any changes.     Kelli Hager RN on 12/14/2022 at 4:01 PM  --------------------------------------------------    ----- Message -----   From: Janet Duke PA   Sent: 12/15/2022   9:19 AM CST   To: Cardiology  Nurse-   Subject: RE: update                                       If her dizziness is ok, lets offer that she can go up a bit further on the lasix to 40mg alternating with 20mg to see if that helps.     Thanks     Called patient who denies Dizziness, so we reviewed recommendations of increasing Lasix as above.  patient was informed to call with questions or concerns.  Patient verbalized understanding, agreed with plan and denied any further questions. Saray Angel RN on 12/15/2022 at 9:43 AM  ---------------------------------------------------------      Zio monitor returned. In process status. Lab orders faxed to [facility] for pt to complete prior to Jan office visit. Pt verbalized understanding  Kelli Hager RN on 12/27/2022 at 9:02 AM

## 2022-12-18 DIAGNOSIS — Z79.2 PROPHYLACTIC ANTIBIOTIC: ICD-10-CM

## 2022-12-19 RX ORDER — DAPSONE 100 MG/1
TABLET ORAL
Qty: 30 TABLET | Refills: 1 | Status: SHIPPED | OUTPATIENT
Start: 2022-12-19 | End: 2023-01-11

## 2022-12-19 NOTE — TELEPHONE ENCOUNTER
Good Afternoon.  This got routed to us I do believe by mistake. We wanted to get this routed right away

## 2023-01-06 ENCOUNTER — LAB REQUISITION (OUTPATIENT)
Dept: LAB | Facility: CLINIC | Age: 73
End: 2023-01-06
Payer: MEDICARE

## 2023-01-06 DIAGNOSIS — R06.09 OTHER FORMS OF DYSPNEA: ICD-10-CM

## 2023-01-06 DIAGNOSIS — I27.20 PULMONARY HYPERTENSION, UNSPECIFIED (H): ICD-10-CM

## 2023-01-06 LAB
ALBUMIN SERPL BCG-MCNC: 3.9 G/DL (ref 3.5–5.2)
ALP SERPL-CCNC: 110 U/L (ref 35–104)
ALT SERPL W P-5'-P-CCNC: 36 U/L (ref 10–35)
ANION GAP SERPL CALCULATED.3IONS-SCNC: 16 MMOL/L (ref 7–15)
AST SERPL W P-5'-P-CCNC: 19 U/L (ref 10–35)
BASOPHILS # BLD AUTO: 0.1 10E3/UL (ref 0–0.2)
BASOPHILS NFR BLD AUTO: 0 %
BILIRUB SERPL-MCNC: 1.1 MG/DL
BUN SERPL-MCNC: 24.7 MG/DL (ref 8–23)
CALCIUM SERPL-MCNC: 10.1 MG/DL (ref 8.8–10.2)
CHLORIDE SERPL-SCNC: 96 MMOL/L (ref 98–107)
CREAT SERPL-MCNC: 1.05 MG/DL (ref 0.51–0.95)
DEPRECATED HCO3 PLAS-SCNC: 27 MMOL/L (ref 22–29)
EOSINOPHIL # BLD AUTO: 0.1 10E3/UL (ref 0–0.7)
EOSINOPHIL NFR BLD AUTO: 0 %
ERYTHROCYTE [DISTWIDTH] IN BLOOD BY AUTOMATED COUNT: 18.3 % (ref 10–15)
GFR SERPL CREATININE-BSD FRML MDRD: 55 ML/MIN/1.73M2
GLUCOSE SERPL-MCNC: 195 MG/DL (ref 70–99)
HCT VFR BLD AUTO: 37.5 % (ref 35–47)
HGB BLD-MCNC: 11.4 G/DL (ref 11.7–15.7)
IMM GRANULOCYTES # BLD: 0.5 10E3/UL
IMM GRANULOCYTES NFR BLD: 3 %
LYMPHOCYTES # BLD AUTO: 1.6 10E3/UL (ref 0.8–5.3)
LYMPHOCYTES NFR BLD AUTO: 10 %
MCH RBC QN AUTO: 30.2 PG (ref 26.5–33)
MCHC RBC AUTO-ENTMCNC: 30.4 G/DL (ref 31.5–36.5)
MCV RBC AUTO: 100 FL (ref 78–100)
MONOCYTES # BLD AUTO: 0.8 10E3/UL (ref 0–1.3)
MONOCYTES NFR BLD AUTO: 5 %
NEUTROPHILS # BLD AUTO: 12.9 10E3/UL (ref 1.6–8.3)
NEUTROPHILS NFR BLD AUTO: 82 %
NRBC # BLD AUTO: 0 10E3/UL
NRBC BLD AUTO-RTO: 0 /100
NT-PROBNP SERPL-MCNC: 2452 PG/ML (ref 0–900)
PLATELET # BLD AUTO: 252 10E3/UL (ref 150–450)
POTASSIUM SERPL-SCNC: 4 MMOL/L (ref 3.4–5.3)
PROT SERPL-MCNC: 5.9 G/DL (ref 6.4–8.3)
RBC # BLD AUTO: 3.77 10E6/UL (ref 3.8–5.2)
SODIUM SERPL-SCNC: 139 MMOL/L (ref 136–145)
WBC # BLD AUTO: 16 10E3/UL (ref 4–11)

## 2023-01-06 PROCEDURE — 83880 ASSAY OF NATRIURETIC PEPTIDE: CPT | Mod: ORL | Performed by: FAMILY MEDICINE

## 2023-01-06 PROCEDURE — 85025 COMPLETE CBC W/AUTO DIFF WBC: CPT | Mod: ORL | Performed by: FAMILY MEDICINE

## 2023-01-06 PROCEDURE — 80053 COMPREHEN METABOLIC PANEL: CPT | Mod: ORL | Performed by: FAMILY MEDICINE

## 2023-01-09 ENCOUNTER — TEAM CONFERENCE (OUTPATIENT)
Dept: PULMONOLOGY | Facility: CLINIC | Age: 73
End: 2023-01-09

## 2023-01-09 NOTE — PROGRESS NOTES
ILD Conference      Patient Name: Patty Soliman    Reason for conference discussion/Specific Question:  New consult    Referring Physician:  Daniel Sanchez MD    Radiology Interpretation:   CTs reviewed 11/23/22, 8/1/22  Mosaic attenuation; large pulmonary artery; worsened course of reticulation; unclear if it is all fibrosis. Yobani Veliz MD (radiology)        There was a consensus recommendation for the following actions:     Maintain plan as dicussed in 11/23/22 visit.   Return visit scheduled for 2/1/23 with PFTs, CT, Dexa, and esophogram     Pulmonary/ILD Provider: Shannan Rodriguez MD (pulm)    1/10/23: Spoke with patient to provide update. Notes she just started decreased dose of prednisone per taper plan (20 mg). Pt verbalized intent to present for Feb 1 visits as scheduled.

## 2023-01-11 DIAGNOSIS — Z79.2 PROPHYLACTIC ANTIBIOTIC: ICD-10-CM

## 2023-01-11 RX ORDER — DAPSONE 100 MG/1
TABLET ORAL
Qty: 30 TABLET | Refills: 1 | Status: SHIPPED | OUTPATIENT
Start: 2023-01-11 | End: 2023-02-06

## 2023-01-15 NOTE — PROGRESS NOTES
CARDIOLOGY  CLINIC TELEPHONE VISIT    Date of virtual visit: 01/16/23      Patty Soliman is a 73 year old female who is being evaluated via a billable telephone visit.        I have reviewed and updated the patient's Past Medical History, Social History, Family History and Medication List.    MEDICATIONS:  Current Outpatient Medications   Medication Sig Dispense Refill     aspirin 325 MG tablet [ASPIRIN 325 MG TABLET] daily.       atorvastatin (LIPITOR) 10 MG tablet [ATORVASTATIN (LIPITOR) 10 MG TABLET] daily.       cholecalciferol, vitamin D3, 1,000 unit tablet [CHOLECALCIFEROL, VITAMIN D3, 1,000 UNIT TABLET] Take 2,000 Units by mouth daily.       coenzyme Q10 200 mg capsule [COENZYME Q10 200 MG CAPSULE] Take 200 mg by mouth daily.       dapsone (ACZONE) 100 MG tablet TAKE 1 TABLET BY MOUTH EVERY DAY 30 tablet 1     dorzolamide-timolol (COSOPT) 22.3-6.8 mg/mL ophthalmic solution [DORZOLAMIDE-TIMOLOL (COSOPT) 22.3-6.8 MG/ML OPHTHALMIC SOLUTION] 1 gtt       furosemide (LASIX) 20 MG tablet Take 1 tablet (20 mg) by mouth daily 90 tablet 3     latanoprost (XALATAN) 0.005 % ophthalmic solution [LATANOPROST (XALATAN) 0.005 % OPHTHALMIC SOLUTION] Administer 1 drop to both eyes daily.        metFORMIN (GLUCOPHAGE) 500 MG tablet [METFORMIN (GLUCOPHAGE) 500 MG TABLET] Take 1,000 mg by mouth daily with breakfast.        metoprolol tartrate (LOPRESSOR) 25 MG tablet [METOPROLOL TARTRATE (LOPRESSOR) 25 MG TABLET] Take 25 mg by mouth daily.        omeprazole (PRILOSEC) 20 MG capsule [OMEPRAZOLE (PRILOSEC) 20 MG CAPSULE] Take 20 mg by mouth daily before breakfast.        potassium chloride ER (KLOR-CON M) 20 MEQ CR tablet Take 2 tablets (40 mEq) by mouth 2 times daily 180 tablet 3     predniSONE (DELTASONE) 5 MG tablet TAKE 10 TABLETS (50 MG) BY MOUTH DAILY FOR 14 DAYS, THEN 8 TABLETS (40 MG) DAILY FOR 14 DAYS, THEN 6 TABLETS (30 MG) DAILY FOR 14 DAYS, THEN 4 TABLETS (20 MG) DAILY FOR 14 DAYS, THEN 2 TABLETS (10 MG) DAILY  FOR 14 DAYS, THEN 1 TABLET (5 MG) DAILY FOR 30 DAYS. 450 tablet 0     sildenafil (REVATIO) 20 MG tablet Take 1 tablet (20 mg) by mouth 3 times daily 90 tablet 11     Treprostinil 64 MCG POWD Inhale 64 mcg into the lungs 4 times daily       fluticasone (FLONASE) 50 mcg/actuation nasal spray [FLUTICASONE (FLONASE) 50 MCG/ACTUATION NASAL SPRAY] as needed. (Patient not taking: Reported on 1/16/2023)       letrozole (FEMARA) 2.5 mg tablet [LETROZOLE (FEMARA) 2.5 MG TABLET] Take 1 tablet by mouth daily. (Patient not taking: Reported on 1/16/2023)         ALLERG  Escitalopram oxalate [escitalopram], Sulfa (sulfonamide antibiotics) [sulfa drugs], and Sulfamethoxazole-trimethoprim [sulfamethoxazole w/trimethoprim]      Primary PH cardiologist: Dr. Sanchez    Pt reported vitals:  Wt: 129#  BP: (yesterday with therapy) 120/68      HPI:  Ms. Soliman is a pleasant 73 year old female with a PMHx including DM, HTN, dyslipidemia, CKD, and progressive BOYER. Workup revealed RV dilation and RV dysfunction via echocardiogram. 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 Additionally, this past summer she was diagnosed with pulmonary fibrosis. She then was referred to the PH clinic and underwent a RHC which showed moderate PAH with reduced cardiac output. At that time, it was recommended she start inhaled Tyvaso DPI, and was started on digoxin for RV support. Plan was to also add sildenafil down the road. Additionally, she was referred to our ILD clinic.     When I met with Patty in November, she was on Tyvaso 64mcg QID and was tolerating well with the exception of a cough, though this was chronic and not worsened. She had reported having bradycardia and thus we had recently discontinued her digoxin; however a family member (Summer) who accompanied her our visit relayed that she rarely sees her with heart rates under 110 at home. At that visit, we discussed adding sildenafil, and I asked her to  return for follow up to further assess her heart rates. When we met last (virtually again per her request) she was doing ok on sildenafil except having lower BP, so we discontinued her dyazide. She was asked to check her BP twice daily at home but she misunderstood and instead dropped her sildenafil to BID instead of TID. With this change, BP sounded to have improved slightly. Dr. Bosch had increased her oxygen to 8-10L at home. With this, dizziness had improved and her sats were mostly in the 90s though does relay that they still drop with exertion. She felt that Tyvaso and sildenafil have been helpful, as she believes she is recovering more quickly. That visit, to help clarify her heart rates, I suggested a ziopatch.     Today, we are meeting virtually once again; I had requested she come in person for better assessment, but unfortunately this did not occur. She is accompanied by her niece Summer again today, who is involved in her care. It sounds as if she has been doing better recently, but with attempts to reduce prednisone has noted more desaturation/BOYER once again. She is compliant with oxygen at 8-10L at all times, but will get lightheaded and oxygen levels will drop if she overdoes it (laundry, etc). She is not checking her BP routinely at home, but per her report with therapy has been good (120/68 yesterday). Weight is stable and she has only some mild edema below her ankles.    She does still have palpitations at times, again, when she overdoes it or oxygen levels are low. Her Ziopatch was reviewed today and showed predominantly SVT. She did have some short runs of monomorphic VT and occasional PAC/PVCs. Average HR was 91 and there were no pauses.     The patient did not have any new labs performed for our visit today, but most recent available labs were reviewed as below.       CURRENT PULMONARY HYPERTENSION REGIMEN:    PAH Rx: Tyvaso 64mcg DPI QID, sildenafil 20mg BID (reduced by patient from TID  due to hypotension)    Diuretics: Lasix 20mg daily      Oxygen: 8-10L NC    Anticoagulation: None    Pulm: Dr. Rodriguez       Assessment/Plan:    1. Pulmonary hypertension.   --Ms. Soliman has moderate to severe PAH with evidence of RV dysfunction. She is now on Tyvaso DPI 64mcg QID and tolerating well. We added sildenafil which caused some mild hypotension, and she reduced her dose to BID. Today, per her report, BP seems stable when they check for therapy and her lightheaded spells are mainly when her oxygen sats are low. Thus, will increase back to 20mg TID today which she is agreeable to. Unfortunately, her REVEAL risk score is high for adverse outcomes at 12, and she is not a candidate for lung transplantation because of her age.   --She was initially placed on digoxin for RV support, but this had to be stopped due to self reported bradycardia/dizziness. Follow up zipatch did not show significant bradycardia or pauses, but I reviewed with Dr. Sanchez who would like to continue to defer digoxin due to her age, etc. Will continue to follow closely.   --As today is a virtual visit I cannot fully assess her volume status. However weight is stable and she reports no worsening edema. Renal function is acceptable on Lasix 20mg daily which we will continue.     --She is now following in our ILD clinic with Dr. Rodriguez. She was placed on prednisone and repeat imaging and PFTs are planned next month. Continue supplemental oxygen as is.     Follow up plan: Return in ~8 weeks with repeat echocardiogram and labs to see Dr. Sanchez. Will see if he would like to repeat RHC prior to that visit as well. We are happy to see the patient back sooner with any new concerns.       Testing/labs:    Most recent labs:    Latest Reference Range & Units 12/09/22 14:48 01/06/23 12:03   Sodium 136 - 145 mmol/L 138 139   Potassium 3.4 - 5.3 mmol/L 4.0 4.0   Chloride 98 - 107 mmol/L 94 (L) 96 (L)   Carbon Dioxide (CO2) 22 - 29 mmol/L 24 27    Urea Nitrogen 8.0 - 23.0 mg/dL 23.1 (H) 24.7 (H)   Creatinine 0.51 - 0.95 mg/dL 0.98 (H) 1.05 (H)   GFR Estimate >60 mL/min/1.73m2 60 (L) 55 (L)   Calcium 8.8 - 10.2 mg/dL 9.3 10.1   Anion Gap 7 - 15 mmol/L 20 (H) 16 (H)   Albumin 3.5 - 5.2 g/dL  3.9   Protein Total 6.4 - 8.3 g/dL  5.9 (L)   Alkaline Phosphatase 35 - 104 U/L  110 (H)   ALT 10 - 35 U/L  36 (H)   AST 10 - 35 U/L  19   Bilirubin Total <=1.2 mg/dL  1.1   Glucose 70 - 99 mg/dL 301 (H) 195 (H)   N-Terminal Pro Bnp 0 - 900 pg/mL  2,452 (H)   WBC 4.0 - 11.0 10e3/uL  16.0 (H)   Hemoglobin 11.7 - 15.7 g/dL  11.4 (L)   Hematocrit 35.0 - 47.0 %  37.5   Platelet Count 150 - 450 10e3/uL  252   RBC Count 3.80 - 5.20 10e6/uL  3.77 (L)   MCV 78 - 100 fL  100   MCH 26.5 - 33.0 pg  30.2   MCHC 31.5 - 36.5 g/dL  30.4 (L)   RDW 10.0 - 15.0 %  18.3 (H)   (L): Data is abnormally low  (H): Data is abnormally high      Other recent pertinent testing:    Echo 8/1/22  Interpretation Summary  Left ventricular function is normal.The ejection fraction is 60-65%.  Paradoxical septal motion consistent with right ventricular pressure and  volume overload is present.  Moderate to severe right ventricular dilation is present. The RV function is  moderately to severely reduced. The RVFAC is 30%, the TAPSE is 1.6 cm, and the  RV S' is 6 cm/s.  There is early shunting of saline on the bubble study. This is consistent with  an intracardiac shunt, likely due to a patent foramen ovale.  Moderate to severe tricuspid insufficiency is present.  The estimated PA systolic pressure is at least 92 mmHg.  There is mild dilation of the ascending aorta (3.7 cm, indexed value 2.31  cm/m2).  IVC diameter >2.1 cm collapsing <50% with sniff suggests a high RA pressure  estimated at 15 mmHg or greater.  No pericardial effusion is present.  There is no prior study for direct comparison.      [facility] 8/19/22  Hemodynamics    Baseline Hemodynamics:  RA 9/16/9 mmHg  RV 65/11 mmHg  PA 64/20/37 mmHg  CWP  21/22/13 mmHg  CO (Terrance) 1.77 L/min  CI (Terrance) 1.11 L/min/m2  CO (TD) 2.45 L/min  CI (TD) 1.54 L/min/m2   mmHg   SVR 4,338 dynes (by measured Terrance)  PVR 13.56 GRAFF (by measured Terrance)  PVR 9.8 (by TD)    During Nitric Oxide:  RA 12/8/7 mmHg  PA 49/17/30 mmHg  CWP 4/4/4 mmHg  CO (TD) 3.4 L/min  CI (TD) 2.13 L/min/m2  Calculated cardiac output and index not included as the baseline hemodynamics were performed using measured Terrance.    mmHg   SVR 2,188 dynes  PVR 7.64 GRAFF    Shunt Run saturations:   SVC: 59%  IVC: 72% (syringe in contact with room air prior to processing)   RA: 56%  RV: 55%  PA: 56%     Right sided filling pressures are mildly elevated. Left sided filling pressures are mildly elevated. Moderately elevated pulmonary artery hypertension. Reduced cardiac output level.       NYHA Functional Class:  3      Phone Visit Details    Start time: 0826  End time: 0840    A total of 15  minutes was spent today performing chart and history review, pre and post visit documentation, and care coordination.      Janet Duke PA-C  [facility]  Pager [phone]

## 2023-01-16 ENCOUNTER — VIRTUAL VISIT (OUTPATIENT)
Dept: CARDIOLOGY | Facility: CLINIC | Age: 73
End: 2023-01-16
Payer: MEDICARE

## 2023-01-16 DIAGNOSIS — I27.20 PULMONARY HYPERTENSION (H): ICD-10-CM

## 2023-01-16 PROCEDURE — 99442 PR PHYSICIAN TELEPHONE EVALUATION 11-20 MIN: CPT | Mod: 95 | Performed by: PHYSICIAN ASSISTANT

## 2023-01-16 RX ORDER — SILDENAFIL CITRATE 20 MG/1
20 TABLET ORAL 3 TIMES DAILY
Qty: 90 TABLET | Refills: 11 | Status: ON HOLD
Start: 2023-01-16 | End: 2023-03-15

## 2023-01-16 NOTE — LETTER
1/16/2023    Meg Kahn MD  [address]  [place] [address]    RE: Patty Soliman       Dear Colleague,     I had the pleasure of seeing Patty Soliman in the [facility].        CARDIOLOGY  CLINIC TELEPHONE VISIT    Date of virtual visit: 01/16/23      Patty Soliman is a 73 year old female who is being evaluated via a billable telephone visit.        I have reviewed and updated the patient's Past Medical History, Social History, Family History and Medication List.    MEDICATIONS:  Current Outpatient Medications   Medication Sig Dispense Refill     aspirin 325 MG tablet [ASPIRIN 325 MG TABLET] daily.       atorvastatin (LIPITOR) 10 MG tablet [ATORVASTATIN (LIPITOR) 10 MG TABLET] daily.       cholecalciferol, vitamin D3, 1,000 unit tablet [CHOLECALCIFEROL, VITAMIN D3, 1,000 UNIT TABLET] Take 2,000 Units by mouth daily.       coenzyme Q10 200 mg capsule [COENZYME Q10 200 MG CAPSULE] Take 200 mg by mouth daily.       dapsone (ACZONE) 100 MG tablet TAKE 1 TABLET BY MOUTH EVERY DAY 30 tablet 1     dorzolamide-timolol (COSOPT) 22.3-6.8 mg/mL ophthalmic solution [DORZOLAMIDE-TIMOLOL (COSOPT) 22.3-6.8 MG/ML OPHTHALMIC SOLUTION] 1 gtt       furosemide (LASIX) 20 MG tablet Take 1 tablet (20 mg) by mouth daily 90 tablet 3     latanoprost (XALATAN) 0.005 % ophthalmic solution [LATANOPROST (XALATAN) 0.005 % OPHTHALMIC SOLUTION] Administer 1 drop to both eyes daily.        metFORMIN (GLUCOPHAGE) 500 MG tablet [METFORMIN (GLUCOPHAGE) 500 MG TABLET] Take 1,000 mg by mouth daily with breakfast.        metoprolol tartrate (LOPRESSOR) 25 MG tablet [METOPROLOL TARTRATE (LOPRESSOR) 25 MG TABLET] Take 25 mg by mouth daily.        omeprazole (PRILOSEC) 20 MG capsule [OMEPRAZOLE (PRILOSEC) 20 MG CAPSULE] Take 20 mg by mouth daily before breakfast.        potassium chloride ER (KLOR-CON M) 20 MEQ CR tablet Take 2 tablets (40 mEq) by mouth 2 times daily 180 tablet 3     predniSONE (DELTASONE) 5 MG tablet  TAKE 10 TABLETS (50 MG) BY MOUTH DAILY FOR 14 DAYS, THEN 8 TABLETS (40 MG) DAILY FOR 14 DAYS, THEN 6 TABLETS (30 MG) DAILY FOR 14 DAYS, THEN 4 TABLETS (20 MG) DAILY FOR 14 DAYS, THEN 2 TABLETS (10 MG) DAILY FOR 14 DAYS, THEN 1 TABLET (5 MG) DAILY FOR 30 DAYS. 450 tablet 0     sildenafil (REVATIO) 20 MG tablet Take 1 tablet (20 mg) by mouth 3 times daily 90 tablet 11     Treprostinil 64 MCG POWD Inhale 64 mcg into the lungs 4 times daily       fluticasone (FLONASE) 50 mcg/actuation nasal spray [FLUTICASONE (FLONASE) 50 MCG/ACTUATION NASAL SPRAY] as needed. (Patient not taking: Reported on 1/16/2023)       letrozole (FEMARA) 2.5 mg tablet [LETROZOLE (FEMARA) 2.5 MG TABLET] Take 1 tablet by mouth daily. (Patient not taking: Reported on 1/16/2023)         ALLERG  Escitalopram oxalate [escitalopram], Sulfa (sulfonamide antibiotics) [sulfa drugs], and Sulfamethoxazole-trimethoprim [sulfamethoxazole w/trimethoprim]      Primary PH cardiologist: Dr. Sanchez    Pt reported vitals:  Wt: 129#  BP: (yesterday with therapy) 120/68      HPI:  Ms. Soliman is a pleasant 73 year old female with a PMHx including DM, HTN, dyslipidemia, CKD, and progressive BOYER. Workup revealed RV dilation and RV dysfunction via echocardiogram. 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 Additionally, this past summer she was diagnosed with pulmonary fibrosis. She then was referred to the PH clinic and underwent a RHC which showed moderate PAH with reduced cardiac output. At that time, it was recommended she start inhaled Tyvaso DPI, and was started on digoxin for RV support. Plan was to also add sildenafil down the road. Additionally, she was referred to our ILD clinic.     When I met with Patty in November, she was on Tyvaso 64mcg QID and was tolerating well with the exception of a cough, though this was chronic and not worsened. She had reported having bradycardia and thus we had recently discontinued her  digoxin; however a family member (Summer) who accompanied her our visit relayed that she rarely sees her with heart rates under 110 at home. At that visit, we discussed adding sildenafil, and I asked her to return for follow up to further assess her heart rates. When we met last (virtually again per her request) she was doing ok on sildenafil except having lower BP, so we discontinued her dyazide. She was asked to check her BP twice daily at home but she misunderstood and instead dropped her sildenafil to BID instead of TID. With this change, BP sounded to have improved slightly. Dr. Bosch had increased her oxygen to 8-10L at home. With this, dizziness had improved and her sats were mostly in the 90s though does relay that they still drop with exertion. She felt that Tyvaso and sildenafil have been helpful, as she believes she is recovering more quickly. That visit, to help clarify her heart rates, I suggested a ziopatch.     Today, we are meeting virtually once again; I had requested she come in person for better assessment, but unfortunately this did not occur. She is accompanied by her niece Summer again today, who is involved in her care. It sounds as if she has been doing better recently, but with attempts to reduce prednisone has noted more desaturation/BOYER once again. She is compliant with oxygen at 8-10L at all times, but will get lightheaded and oxygen levels will drop if she overdoes it (laundry, etc). She is not checking her BP routinely at home, but per her report with therapy has been good (120/68 yesterday). Weight is stable and she has only some mild edema below her ankles.    She does still have palpitations at times, again, when she overdoes it or oxygen levels are low. Her Ziopatch was reviewed today and showed predominantly SVT. She did have some short runs of monomorphic VT and occasional PAC/PVCs. Average HR was 91 and there were no pauses.     The patient did not have any new labs  performed for our visit today, but most recent available labs were reviewed as below.       CURRENT PULMONARY HYPERTENSION REGIMEN:    PAH Rx: Tyvaso 64mcg DPI QID, sildenafil 20mg BID (reduced by patient from TID due to hypotension)    Diuretics: Lasix 20mg daily      Oxygen: 8-10L NC    Anticoagulation: None    Pulm: Dr. Rodriguez       Assessment/Plan:    1. Pulmonary hypertension.   --Ms. Soliman has moderate to severe PAH with evidence of RV dysfunction. She is now on Tyvaso DPI 64mcg QID and tolerating well. We added sildenafil which caused some mild hypotension, and she reduced her dose to BID. Today, per her report, BP seems stable when they check for therapy and her lightheaded spells are mainly when her oxygen sats are low. Thus, will increase back to 20mg TID today which she is agreeable to. Unfortunately, her REVEAL risk score is high for adverse outcomes at 12, and she is not a candidate for lung transplantation because of her age.   --She was initially placed on digoxin for RV support, but this had to be stopped due to self reported bradycardia/dizziness. Follow up zipatch did not show significant bradycardia or pauses, but I reviewed with Dr. Sanchez who would like to continue to defer digoxin due to her age, etc. Will continue to follow closely.   --As today is a virtual visit I cannot fully assess her volume status. However weight is stable and she reports no worsening edema. Renal function is acceptable on Lasix 20mg daily which we will continue.     --She is now following in our ILD clinic with Dr. Rodriguez. She was placed on prednisone and repeat imaging and PFTs are planned next month. Continue supplemental oxygen as is.     Follow up plan: Return in ~8 weeks with repeat echocardiogram and labs to see Dr. Sanchez. Will see if he would like to repeat RHC prior to that visit as well. We are happy to see the patient back sooner with any new concerns.       Testing/labs:    Most recent labs:    Latest  Reference Range & Units 12/09/22 14:48 01/06/23 12:03   Sodium 136 - 145 mmol/L 138 139   Potassium 3.4 - 5.3 mmol/L 4.0 4.0   Chloride 98 - 107 mmol/L 94 (L) 96 (L)   Carbon Dioxide (CO2) 22 - 29 mmol/L 24 27   Urea Nitrogen 8.0 - 23.0 mg/dL 23.1 (H) 24.7 (H)   Creatinine 0.51 - 0.95 mg/dL 0.98 (H) 1.05 (H)   GFR Estimate >60 mL/min/1.73m2 60 (L) 55 (L)   Calcium 8.8 - 10.2 mg/dL 9.3 10.1   Anion Gap 7 - 15 mmol/L 20 (H) 16 (H)   Albumin 3.5 - 5.2 g/dL  3.9   Protein Total 6.4 - 8.3 g/dL  5.9 (L)   Alkaline Phosphatase 35 - 104 U/L  110 (H)   ALT 10 - 35 U/L  36 (H)   AST 10 - 35 U/L  19   Bilirubin Total <=1.2 mg/dL  1.1   Glucose 70 - 99 mg/dL 301 (H) 195 (H)   N-Terminal Pro Bnp 0 - 900 pg/mL  2,452 (H)   WBC 4.0 - 11.0 10e3/uL  16.0 (H)   Hemoglobin 11.7 - 15.7 g/dL  11.4 (L)   Hematocrit 35.0 - 47.0 %  37.5   Platelet Count 150 - 450 10e3/uL  252   RBC Count 3.80 - 5.20 10e6/uL  3.77 (L)   MCV 78 - 100 fL  100   MCH 26.5 - 33.0 pg  30.2   MCHC 31.5 - 36.5 g/dL  30.4 (L)   RDW 10.0 - 15.0 %  18.3 (H)   (L): Data is abnormally low  (H): Data is abnormally high      Other recent pertinent testing:    Echo 8/1/22  Interpretation Summary  Left ventricular function is normal.The ejection fraction is 60-65%.  Paradoxical septal motion consistent with right ventricular pressure and  volume overload is present.  Moderate to severe right ventricular dilation is present. The RV function is  moderately to severely reduced. The RVFAC is 30%, the TAPSE is 1.6 cm, and the  RV S' is 6 cm/s.  There is early shunting of saline on the bubble study. This is consistent with  an intracardiac shunt, likely due to a patent foramen ovale.  Moderate to severe tricuspid insufficiency is present.  The estimated PA systolic pressure is at least 92 mmHg.  There is mild dilation of the ascending aorta (3.7 cm, indexed value 2.31  cm/m2).  IVC diameter >2.1 cm collapsing <50% with sniff suggests a high RA pressure  estimated at 15 mmHg or  greater.  No pericardial effusion is present.  There is no prior study for direct comparison.      RHC 8/19/22  Hemodynamics    Baseline Hemodynamics:  RA 9/16/9 mmHg  RV 65/11 mmHg  PA 64/20/37 mmHg  CWP 21/22/13 mmHg  CO (Terrance) 1.77 L/min  CI (Terrance) 1.11 L/min/m2  CO (TD) 2.45 L/min  CI (TD) 1.54 L/min/m2   mmHg   SVR 4,338 dynes (by measured Terrance)  PVR 13.56 GRAFF (by measured Terrance)  PVR 9.8 (by TD)    During Nitric Oxide:  RA 12/8/7 mmHg  PA 49/17/30 mmHg  CWP 4/4/4 mmHg  CO (TD) 3.4 L/min  CI (TD) 2.13 L/min/m2  Calculated cardiac output and index not included as the baseline hemodynamics were performed using measured Terrance.    mmHg   SVR 2,188 dynes  PVR 7.64 GRAFF    Shunt Run saturations:   SVC: 59%  IVC: 72% (syringe in contact with room air prior to processing)   RA: 56%  RV: 55%  PA: 56%     Right sided filling pressures are mildly elevated. Left sided filling pressures are mildly elevated. Moderately elevated pulmonary artery hypertension. Reduced cardiac output level.       NYHA Functional Class:  3      Phone Visit Details    Start time: 0826  End time: 0840    A total of 15  minutes was spent today performing chart and history review, pre and post visit documentation, and care coordination.      Janet Duke PA-C  [facility]  Pager [phone]      Thank you for allowing me to participate in the care of your patient.      Sincerely,     JYTOSNA Avalos     [facility]  cc:   No referring provider defined for this encounter.

## 2023-01-16 NOTE — PATIENT INSTRUCTIONS
Thank you for visiting the Pulmonary Hypertension Clinic virtually today.      Today we discussed:     INCREASE your sildenafil to 20mg (one tablet) THREE times daily (morning, mid-day, and evening)      Follow up Appointment Information:  We will arrange for you to see Dr. Sanchez in ~8 weeks with a repeat echocardiogram and bloodwork. I will reach out to him and see if he would like you to have a repeat heart catheterization prior to that visit as well.       Additional Instructions:    1. Continue staying active and eat a heart healthy, low sodium diet.     2. Please keep current list of medications with you at all times.     3. Remember to weigh yourself daily after voiding and write it down on a log. If you have gained/lost 2 pounds overnight or 5 pounds in a week contact us for medication adjustments or further instructions.    4. Please call us immediately if you have syncope (fainting or passing out), chest pain, worsening edema (swelling or weight gain), or general worsening in how you are feeling.       -----------------------------------------------------------------------------------------------------------------    If you have questions or concerns please contact us at:    Kelli Hager RN, BSN     Nurse Coordinator        Pulmonary Hypertension      [facility]                [phone])[phone]         KENNETH Hernandez   (Prior Authorizations)    ()  Clinic   Clinic   Pulmonary Hypertension   Pulmonary Hypertension  [facility]             [facility]  [phone])[phone]    (P) [phone]  (F) [phone]                      ** Please note that you will NOT receive a reminder call regarding your scheduled testing, reminder calls are for provider appointments only.  If you are scheduled for testing within the [place] system you may receive a call  regarding pre-registration for billing purposes only.**     --------------------------------------------------------------------------------------------------------------    Interested in joining a support group?    Pulmonary Hypertension Association  Https://www.phassociation.org/  **Look at the Events Tab** They even have Support Groups that you can call into    [facility] Support Group  Second Saturday of the Month from 1-3 PM   Location: [address] [place] [address] (Currently Virtual)  Leader: Reina French   Phone: [phone]   Email: [email or web address]     Great Videos about Pulmonary Hypertension!!  Scan ME!    Website: bit.ly/[email or web address]

## 2023-01-23 ENCOUNTER — TELEPHONE (OUTPATIENT)
Dept: PULMONOLOGY | Facility: CLINIC | Age: 73
End: 2023-01-23
Payer: MEDICARE

## 2023-01-23 NOTE — TELEPHONE ENCOUNTER
Pt contacted with concerns around increasing symptoms as prednisone taper has progressed.     Notes the following insights at different dosin mg: felt great, had a lot of energy, reports 8 LPM O2 maintained O2 sats above 90% with activity  20 mg: sx started to show; weakness, O2 tolerance lower; more shaky, needing 9-10 LPM for activity  10 mg (since 23): short of breath, more weakness, shaky all the time, needing 9 LPM at rest; 95% during time of call, at least 10 LPM with activity.     Message sent to Dr. Rodriguez.     Shi Rome, RN, BSN  ILD Nurse Care Coordinator  (P) [phone]

## 2023-01-23 NOTE — TELEPHONE ENCOUNTER
Voicemail left for patient with provider recommendation about increasing prednisone. Noted Sulfa allergy; will connect with provider and also send MyChart to patient to seek additional insight.     Shannan Rodriguez MD  You 43 minutes ago (3:29 PM)     AK  We can place her back on 25 mg daily along with bactrim single strength daily until she sees me.     KATLIN Rome, RN, BSN  ILD Nurse Care Coordinator  (P) [phone]

## 2023-02-01 ENCOUNTER — OFFICE VISIT (OUTPATIENT)
Dept: PULMONOLOGY | Facility: CLINIC | Age: 73
End: 2023-02-01
Attending: INTERNAL MEDICINE
Payer: MEDICARE

## 2023-02-01 ENCOUNTER — ANCILLARY PROCEDURE (OUTPATIENT)
Dept: CT IMAGING | Facility: CLINIC | Age: 73
End: 2023-02-01
Attending: INTERNAL MEDICINE
Payer: MEDICARE

## 2023-02-01 ENCOUNTER — ANCILLARY PROCEDURE (OUTPATIENT)
Dept: BONE DENSITY | Facility: CLINIC | Age: 73
End: 2023-02-01
Attending: INTERNAL MEDICINE
Payer: MEDICARE

## 2023-02-01 ENCOUNTER — ANCILLARY PROCEDURE (OUTPATIENT)
Dept: GENERAL RADIOLOGY | Facility: CLINIC | Age: 73
End: 2023-02-01
Attending: INTERNAL MEDICINE
Payer: MEDICARE

## 2023-02-01 VITALS
OXYGEN SATURATION: 96 % | BODY MASS INDEX: 23.92 KG/M2 | HEIGHT: 62 IN | SYSTOLIC BLOOD PRESSURE: 146 MMHG | RESPIRATION RATE: 16 BRPM | DIASTOLIC BLOOD PRESSURE: 81 MMHG | HEART RATE: 89 BPM | WEIGHT: 130 LBS

## 2023-02-01 DIAGNOSIS — R13.14 PHARYNGOESOPHAGEAL DYSPHAGIA: ICD-10-CM

## 2023-02-01 DIAGNOSIS — Z79.52 LONG TERM SYSTEMIC STEROID USER: ICD-10-CM

## 2023-02-01 DIAGNOSIS — J84.9 ILD (INTERSTITIAL LUNG DISEASE) (H): ICD-10-CM

## 2023-02-01 DIAGNOSIS — J84.9 ILD (INTERSTITIAL LUNG DISEASE) (H): Primary | ICD-10-CM

## 2023-02-01 DIAGNOSIS — J45.40 MODERATE PERSISTENT REACTIVE AIRWAY DISEASE WITHOUT COMPLICATION: ICD-10-CM

## 2023-02-01 LAB
DLCOUNC-%PRED-PRE: 33 %
DLCOUNC-PRE: 5.94 ML/MIN/MMHG
DLCOUNC-PRED: 17.84 ML/MIN/MMHG
ERV-%PRED-PRE: 48 %
ERV-PRE: 0.32 L
ERV-PRED: 0.65 L
EXPTIME-PRE: 5.06 SEC
FEF2575-%PRED-PRE: 129 %
FEF2575-PRE: 2 L/SEC
FEF2575-PRED: 1.54 L/SEC
FEFMAX-%PRED-PRE: 122 %
FEFMAX-PRE: 6.05 L/SEC
FEFMAX-PRED: 4.94 L/SEC
FEV1-%PRED-PRE: 88 %
FEV1-PRE: 1.59 L
FEV1FEV6-PRE: 89 %
FEV1FEV6-PRED: 78 %
FEV1FVC-PRE: 89 %
FEV1FVC-PRED: 79 %
FEV1SVC-PRE: 92 %
FEV1SVC-PRED: 67 %
FIFMAX-PRE: 4.57 L/SEC
FRCPLETH-%PRED-PRE: 62 %
FRCPLETH-PRE: 1.63 L
FRCPLETH-PRED: 2.6 L
FVC-%PRED-PRE: 77 %
FVC-PRE: 1.79 L
FVC-PRED: 2.3 L
IC-%PRED-PRE: 68 %
IC-PRE: 1.41 L
IC-PRED: 2.06 L
RVPLETH-%PRED-PRE: 64 %
RVPLETH-PRE: 1.32 L
RVPLETH-PRED: 2.05 L
TLCPLETH-%PRED-PRE: 66 %
TLCPLETH-PRE: 3.05 L
TLCPLETH-PRED: 4.6 L
VA-%PRED-PRE: 60 %
VA-PRE: 2.59 L
VC-%PRED-PRE: 64 %
VC-PRE: 1.73 L
VC-PRED: 2.7 L

## 2023-02-01 PROCEDURE — G0463 HOSPITAL OUTPT CLINIC VISIT: HCPCS | Performed by: INTERNAL MEDICINE

## 2023-02-01 PROCEDURE — 77080 DXA BONE DENSITY AXIAL: CPT | Performed by: INTERNAL MEDICINE

## 2023-02-01 PROCEDURE — 74221 X-RAY XM ESOPHAGUS 2CNTRST: CPT | Mod: GC | Performed by: RADIOLOGY

## 2023-02-01 PROCEDURE — 99215 OFFICE O/P EST HI 40 MIN: CPT | Mod: 25 | Performed by: INTERNAL MEDICINE

## 2023-02-01 PROCEDURE — 94375 RESPIRATORY FLOW VOLUME LOOP: CPT | Performed by: INTERNAL MEDICINE

## 2023-02-01 PROCEDURE — 77081 DXA BONE DENSITY APPENDICULR: CPT | Mod: XU | Performed by: INTERNAL MEDICINE

## 2023-02-01 PROCEDURE — 71250 CT THORAX DX C-: CPT | Mod: MG | Performed by: RADIOLOGY

## 2023-02-01 PROCEDURE — 94726 PLETHYSMOGRAPHY LUNG VOLUMES: CPT | Performed by: INTERNAL MEDICINE

## 2023-02-01 PROCEDURE — 94729 DIFFUSING CAPACITY: CPT | Performed by: INTERNAL MEDICINE

## 2023-02-01 PROCEDURE — G1010 CDSM STANSON: HCPCS | Performed by: RADIOLOGY

## 2023-02-01 RX ORDER — PREDNISONE 5 MG/1
TABLET ORAL
Qty: 261 TABLET | Refills: 0 | Status: SHIPPED | OUTPATIENT
Start: 2023-02-01 | End: 2023-05-02

## 2023-02-01 RX ORDER — FLUTICASONE PROPIONATE 220 UG/1
1 AEROSOL, METERED RESPIRATORY (INHALATION) 2 TIMES DAILY
Qty: 12 G | Refills: 11 | Status: SHIPPED | OUTPATIENT
Start: 2023-02-01 | End: 2023-06-13

## 2023-02-01 RX ORDER — MYCOPHENOLATE MOFETIL 500 MG/1
TABLET ORAL
Qty: 120 TABLET | Refills: 3 | Status: SHIPPED | OUTPATIENT
Start: 2023-02-01 | End: 2023-03-07

## 2023-02-01 ASSESSMENT — PAIN SCALES - GENERAL: PAINLEVEL: NO PAIN (0)

## 2023-02-01 NOTE — LETTER
2/1/2023         RE: Patty Soliman  [address]  [facility] [address]        Dear Colleague,    Thank you for referring your patient, Patty Soliamn, to the [facility] [place]. Please see a copy of my visit note below.    [facility]    Reason for Visit  Patty Soliman is a 72 year old year old female who is being seen for shortness of breath.   HPI from previous visit   Patient is 72 years old female with past history outlined below presents today to establish care with us in the ILD clinic.  She had progressive exertional dyspnea over the last several years but does not seem to be much worse in the last 6 months.  Back in August 2020, CTA was negative for pulmonary embolism, ILD described as probable UIP pattern was observed.  She has been seen by our pulmonary hypertension clinic for severe PH by echocardiography, RVSP 60+ with structural and functional impairment of the RV.  She underwent right heart cath and pulmonary hypertension was confirmed with negative vasoreactivity testing. Her cardiac index was also noticed to be reduced. She was recently started on ilioprost and sildenafil, with some symptomatic improvement. Cardiology was not convinced that the degree of ILD explains her pulmonary hypertension, which I do agree with based on the CT scan from August 2022.  However, the most recent CT scan from today shows significant progression in ILD.  Her pattern is inconsistent with UIP to me and there is significant mosaicism and signs of small airway disease suggestive of hypersensitivity pneumonitis.  She did not have a trial of steroids before.  Neither does she have an inhaler or nebulizer treatments attempted.  She does have some down products like a jacket and a comforter, but no other triggers for HP by history.  She denies connective tissue disease symptoms with the exception to dysphagia which seems  "to be early phase and has not been worked up before.  She does not have Raynaud's phenomena, arthralgias, muscle stiffness, oral ulcers, epistaxis, sinus symptoms, skin rash, skin ulcers, or hematuria.  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    Updates from today 2/1/23  Patient returns today for follow up. She continues to have dyspnea at rest, and still using oxygen at 8-10 LPM for several months. She is accompanied by her daughter. We re-increased prednisone to 25 mg daily from 20 given worsening dyspnea.       Vitals: BP (!) 146/81   Pulse 89   Resp 16   Ht 1.575 m (5' 2\")   Wt 59 kg (130 lb)   SpO2 96%   BMI 23.78 kg/m      Exam:   GENERAL APPEARANCE: Well developed, well nourished, alert, and in no apparent distress.  LYMPHATICS: No significant cervical, or supraclavicular nodes.  HEENT: Normal oral mucus membranes, tongue and dentition. Normal nose.   RESP: good air flow throughout.  No crackles. No rhonchi. No wheezes.  CV: Normal S1, S2, regular rhythm, normal rate. No murmur.  No LE edema.   ABD: Soft, lax, nontender.  MS: extremities normal. No clubbing. No cyanosis.  SKIN: no rash on limited exam.  NEURO: Mentation intact, speech normal, normal gait and stance.  PSYCH: mentation appears normal. and affect normal/bright.    Results:  RHC 8/19/22  RA 9/16/9 mmHg  RV 65/11 mmHg  PA 64/20/37 mmHg  CWP 21/22/13 mmHg  CO (Terrance) 1.77 L/min  CI (Terrance) 1.11 L/min/m2  CO (TD) 2.45 L/min  CI (TD) 1.54 L/min/m2   mmHg   SVR 4,338 dynes (by measured Terrance)  PVR 13.56 GRAFF (by measured Terrance)  PVR 9.8 (by TD)     No significant response to vasodilator therapy.     PFTs today 2/1/23          Interpretation:   Isolated severe reduction in DLCO in keeping with pulmonary vascular disease initially but evolving restriction noticed, now restriction is classified as mild by TLC z-score.     Chest imaging:  Personally reviewed CT chest images from today 2/1 and " compared to 11/2022  Agreed with radiology report:                                                             IMPRESSION: Minimal if any improvement in the subpleural fibrosis in  the right lower lobe with no other change in the interval otherwise.  Diffuse atherosclerosis. Severe mid thoracic dextrocurvature.  Unchanged left adrenal gland hyperplasia.         Assessment and plan:   1. ILD, favor hypersensitivity pneumonitis (HP)  Patient is 73F with ILD and chronic hypoxic respiratory failure presents for follow up on advanced ILD. Her ILD seems to be progressive hypersensitivity pneumonitis pattern, based on mosaicism on CT and aitrapping on exhalation. She is a lifetime nonsmoker which is associated with slight increase in HP incidence, and has some down products. I believe her disease is transforming to chronic type as her TLC is now reflecting restriction. She also has significant pulmonary hypertension that is contributing largely to her oxygen needs. Complete autoimmune serologies have been negative as well as HP panels. Down products have been removed from environment. We tried a prednisone taper last visit but unfortunately no subjective or objective improvement is seen, however, it is possible that it stabilized the disease. She re-increased prednisone to 25 mg for recent worsening dyspnea and continues to take dapsone.   Plan:   - Follow up Esophogram for dysphagia   - Flovent, 220 mcg, two puffs BID for small airway disease component   -  BID and ramp up to target of 1500 BID  - MMF monitoring labs   - Slow prednsione taper, by 5 mg every two weeks   - May re-increase prednisone if symptoms re-worsen during taper, instructed to update us with such change   - Palliative care referral, as anticipate prolonged hospitalization with flare and unlikely to extubate if she gets intubated   - PFTs in 4 months  - RTC in 4 months     2. Precapillary pulmonary hypertension  Receiving ilioprost and sildenafil  per PH clinic with symptomatic improvement.     3. Dysphagia  - Esophagram per above     4. Chronic hypoxic respiratory failure  Needing 8-10 LPM with activity  - Portable home O2     5. Osteopenia  Noted on CT chest  - Follow up Dexa scan and prescribing alendronate if needed, provided esophogram comes back normal     I spent 45 minutes reviewing chart, reviewing test results, talking with and examining patient, formulating plan, and documentation on the day of the encounter.

## 2023-02-01 NOTE — PATIENT INSTRUCTIONS
Inhaler twice daily steroids, to reduce your future prednisone need     Starting cellcept, ramp up as prescribed     If you feel any deterioration, may go back to prior dose of prednisone and notify us     Serial lab work for cellcept     Please call our direct ILD nurses line for questions and concerns: [phone]    For scheduling, please call: [phone]

## 2023-02-01 NOTE — PROGRESS NOTES
[facility] Interstitial Lung Disease Clinic    Reason for Visit  Patty Soliman is a 72 year old year old female who is being seen for shortness of breath.   HPI from previous visit   Patient is 72 years old female with past history outlined below presents today to establish care with us in the ILD clinic.  She had progressive exertional dyspnea over the last several years but does not seem to be much worse in the last 6 months.  Back in August 2020, CTA was negative for pulmonary embolism, ILD described as probable UIP pattern was observed.  She has been seen by our pulmonary hypertension clinic for severe PH by echocardiography, RVSP 60+ with structural and functional impairment of the RV.  She underwent right heart cath and pulmonary hypertension was confirmed with negative vasoreactivity testing. Her cardiac index was also noticed to be reduced. She was recently started on ilioprost and sildenafil, with some symptomatic improvement. Cardiology was not convinced that the degree of ILD explains her pulmonary hypertension, which I do agree with based on the CT scan from August 2022.  However, the most recent CT scan from today shows significant progression in ILD.  Her pattern is inconsistent with UIP to me and there is significant mosaicism and signs of small airway disease suggestive of hypersensitivity pneumonitis.  She did not have a trial of steroids before.  Neither does she have an inhaler or nebulizer treatments attempted.  She does have some down products like a jacket and a comforter, but no other triggers for HP by history.  She denies connective tissue disease symptoms with the exception to dysphagia which seems to be early phase and has not been worked up before.  She does not have Raynaud's phenomena, arthralgias, muscle stiffness, oral ulcers, epistaxis, sinus symptoms, skin rash, skin ulcers, or hematuria.  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    Updates from today 2/1/23  Patient returns today for follow up. She continues to have dyspnea at rest, and still using oxygen at 8-10 LPM for several months. She is accompanied by her daughter. We re-increased prednisone to 25 mg daily from 20 given worsening dyspnea.       Vitals: BP (!) 146/81   Pulse 89   Resp 16   Ht 1.575 m (5' 2\")   Wt 59 kg (130 lb)   SpO2 96%   BMI 23.78 kg/m      Exam:   GENERAL APPEARANCE: Well developed, well nourished, alert, and in no apparent distress.  LYMPHATICS: No significant cervical, or supraclavicular nodes.  HEENT: Normal oral mucus membranes, tongue and dentition. Normal nose.   RESP: good air flow throughout.  No crackles. No rhonchi. No wheezes.  CV: Normal S1, S2, regular rhythm, normal rate. No murmur.  No LE edema.   ABD: Soft, lax, nontender.  MS: extremities normal. No clubbing. No cyanosis.  SKIN: no rash on limited exam.  NEURO: Mentation intact, speech normal, normal gait and stance.  PSYCH: mentation appears normal. and affect normal/bright.    Results:  RHC 8/19/22  RA 9/16/9 mmHg  RV 65/11 mmHg  PA 64/20/37 mmHg  CWP 21/22/13 mmHg  CO (Terrance) 1.77 L/min  CI (Terrance) 1.11 L/min/m2  CO (TD) 2.45 L/min  CI (TD) 1.54 L/min/m2   mmHg   SVR 4,338 dynes (by measured Terrance)  PVR 13.56 GRAFF (by measured Terrance)  PVR 9.8 (by TD)     No significant response to vasodilator therapy.     PFTs today 2/1/23          Interpretation:   Isolated severe reduction in DLCO in keeping with pulmonary vascular disease initially but evolving restriction noticed, now restriction is classified as mild by TLC z-score.     Chest imaging:  Personally reviewed CT chest images from today 2/1 and compared to 11/2022  Agreed with radiology report:                                                             IMPRESSION: Minimal if any improvement in the subpleural fibrosis in  the right lower lobe with no other change in the interval otherwise.  Diffuse " atherosclerosis. Severe mid thoracic dextrocurvature.  Unchanged left adrenal gland hyperplasia.         Assessment and plan:   1. ILD, favor hypersensitivity pneumonitis (HP)  Patient is 73F with ILD and chronic hypoxic respiratory failure presents for follow up on advanced ILD. Her ILD seems to be progressive hypersensitivity pneumonitis pattern, based on mosaicism on CT and aitrapping on exhalation. She is a lifetime nonsmoker which is associated with slight increase in HP incidence, and has some down products. I believe her disease is transforming to chronic type as her TLC is now reflecting restriction. She also has significant pulmonary hypertension that is contributing largely to her oxygen needs. Complete autoimmune serologies have been negative as well as HP panels. Down products have been removed from environment. We tried a prednisone taper last visit but unfortunately no subjective or objective improvement is seen, however, it is possible that it stabilized the disease. She re-increased prednisone to 25 mg for recent worsening dyspnea and continues to take dapsone.   Plan:   - Follow up Esophogram for dysphagia   - Flovent, 220 mcg, two puffs BID for small airway disease component   -  BID and ramp up to target of 1500 BID  - MMF monitoring labs   - Slow prednsione taper, by 5 mg every two weeks   - May re-increase prednisone if symptoms re-worsen during taper, instructed to update us with such change   - Palliative care referral, as anticipate prolonged hospitalization with flare and unlikely to extubate if she gets intubated   - PFTs in 4 months  - RTC in 4 months     2. Precapillary pulmonary hypertension  Receiving ilioprost and sildenafil per PH clinic with symptomatic improvement.     3. Dysphagia  - Esophagram per above     4. Chronic hypoxic respiratory failure  Needing 8-10 LPM with activity  - Portable home O2     5. Osteopenia  Noted on CT chest  - Follow up Dexa scan and prescribing  alendronate if needed, provided esophogram comes back normal     I spent 45 minutes reviewing chart, reviewing test results, talking with and examining patient, formulating plan, and documentation on the day of the encounter.        Answers for HPI/ROS submitted by the patient on 2/1/2023  General Symptoms: No  Skin Symptoms: Yes  EYE SYMPTOMS: Yes  HEART SYMPTOMS: Yes  LUNG SYMPTOMS: No  URINARY SYMPTOMS: Yes  GYNECOLOGIC SYMPTOMS: No  BREAST SYMPTOMS: No  SKELETAL SYMPTOMS: Yes  BLOOD SYMPTOMS: No  NERVOUS SYSTEM SYMPTOMS: Yes  MENTAL HEALTH SYMPTOMS: Yes

## 2023-02-01 NOTE — NURSING NOTE
Chief Complaint   Patient presents with     RECHECK     Return Interstitial Lung     Medications reviewed and vital signs taken.   Alvina Burroughs, CMA

## 2023-02-02 ENCOUNTER — TELEPHONE (OUTPATIENT)
Dept: PULMONOLOGY | Facility: CLINIC | Age: 73
End: 2023-02-02
Payer: MEDICARE

## 2023-02-02 NOTE — TELEPHONE ENCOUNTER
Referral sent to palliative care, pt called but decided not to do an appointment yet, said she is not quite ready for it ...

## 2023-02-05 DIAGNOSIS — Z79.2 PROPHYLACTIC ANTIBIOTIC: ICD-10-CM

## 2023-02-06 DIAGNOSIS — I27.20 PULMONARY HTN (H): Primary | ICD-10-CM

## 2023-02-06 RX ORDER — LIDOCAINE 40 MG/G
CREAM TOPICAL
Status: CANCELLED | OUTPATIENT
Start: 2023-02-06

## 2023-02-06 RX ORDER — DAPSONE 100 MG/1
TABLET ORAL
Qty: 30 TABLET | Refills: 1 | Status: SHIPPED | OUTPATIENT
Start: 2023-02-06 | End: 2023-03-02

## 2023-03-01 ENCOUNTER — TELEPHONE (OUTPATIENT)
Dept: PULMONOLOGY | Facility: CLINIC | Age: 73
End: 2023-03-01
Payer: MEDICARE

## 2023-03-02 ENCOUNTER — LAB REQUISITION (OUTPATIENT)
Dept: LAB | Facility: CLINIC | Age: 73
End: 2023-03-02
Payer: MEDICARE

## 2023-03-02 ENCOUNTER — TRANSFERRED RECORDS (OUTPATIENT)
Dept: HEALTH INFORMATION MANAGEMENT | Facility: CLINIC | Age: 73
End: 2023-03-02

## 2023-03-02 DIAGNOSIS — Z79.2 PROPHYLACTIC ANTIBIOTIC: ICD-10-CM

## 2023-03-02 DIAGNOSIS — J84.9 INTERSTITIAL PULMONARY DISEASE, UNSPECIFIED (H): ICD-10-CM

## 2023-03-02 LAB
ALBUMIN SERPL BCG-MCNC: 4.2 G/DL (ref 3.5–5.2)
ALP SERPL-CCNC: 112 U/L (ref 35–104)
ALT SERPL W P-5'-P-CCNC: 42 U/L (ref 10–35)
ANION GAP SERPL CALCULATED.3IONS-SCNC: 17 MMOL/L (ref 7–15)
AST SERPL W P-5'-P-CCNC: 31 U/L (ref 10–35)
BASOPHILS # BLD AUTO: 0.1 10E3/UL (ref 0–0.2)
BASOPHILS NFR BLD AUTO: 0 %
BILIRUB SERPL-MCNC: 1 MG/DL
BUN SERPL-MCNC: 16.9 MG/DL (ref 8–23)
CALCIUM SERPL-MCNC: 10.3 MG/DL (ref 8.8–10.2)
CHLORIDE SERPL-SCNC: 100 MMOL/L (ref 98–107)
CREAT SERPL-MCNC: 1.02 MG/DL (ref 0.51–0.95)
DEPRECATED HCO3 PLAS-SCNC: 22 MMOL/L (ref 22–29)
EOSINOPHIL # BLD AUTO: 0.2 10E3/UL (ref 0–0.7)
EOSINOPHIL NFR BLD AUTO: 1 %
ERYTHROCYTE [DISTWIDTH] IN BLOOD BY AUTOMATED COUNT: 12.9 % (ref 10–15)
GFR SERPL CREATININE-BSD FRML MDRD: 57 ML/MIN/1.73M2
GLUCOSE SERPL-MCNC: 291 MG/DL (ref 70–99)
HCT VFR BLD AUTO: 42.6 % (ref 35–47)
HGB BLD-MCNC: 13.2 G/DL (ref 11.7–15.7)
IMM GRANULOCYTES # BLD: 0.3 10E3/UL
IMM GRANULOCYTES NFR BLD: 1 %
LYMPHOCYTES # BLD AUTO: 2.4 10E3/UL (ref 0.8–5.3)
LYMPHOCYTES NFR BLD AUTO: 12 %
MCH RBC QN AUTO: 32.6 PG (ref 26.5–33)
MCHC RBC AUTO-ENTMCNC: 31 G/DL (ref 31.5–36.5)
MCV RBC AUTO: 105 FL (ref 78–100)
MONOCYTES # BLD AUTO: 1.4 10E3/UL (ref 0–1.3)
MONOCYTES NFR BLD AUTO: 7 %
NEUTROPHILS # BLD AUTO: 15.2 10E3/UL (ref 1.6–8.3)
NEUTROPHILS NFR BLD AUTO: 79 %
NRBC # BLD AUTO: 0 10E3/UL
NRBC BLD AUTO-RTO: 0 /100
PLATELET # BLD AUTO: 307 10E3/UL (ref 150–450)
POTASSIUM SERPL-SCNC: 4.1 MMOL/L (ref 3.4–5.3)
PROT SERPL-MCNC: 6.3 G/DL (ref 6.4–8.3)
RBC # BLD AUTO: 4.05 10E6/UL (ref 3.8–5.2)
SODIUM SERPL-SCNC: 139 MMOL/L (ref 136–145)
WBC # BLD AUTO: 19.4 10E3/UL (ref 4–11)

## 2023-03-02 PROCEDURE — 85025 COMPLETE CBC W/AUTO DIFF WBC: CPT | Mod: ORL | Performed by: FAMILY MEDICINE

## 2023-03-02 PROCEDURE — 80053 COMPREHEN METABOLIC PANEL: CPT | Mod: ORL | Performed by: FAMILY MEDICINE

## 2023-03-02 RX ORDER — DAPSONE 100 MG/1
TABLET ORAL
Qty: 30 TABLET | Refills: 1 | Status: SHIPPED | OUTPATIENT
Start: 2023-03-02 | End: 2023-03-27

## 2023-03-06 ENCOUNTER — TELEPHONE (OUTPATIENT)
Dept: PULMONOLOGY | Facility: CLINIC | Age: 73
End: 2023-03-06
Payer: MEDICARE

## 2023-03-06 DIAGNOSIS — J84.9 ILD (INTERSTITIAL LUNG DISEASE) (H): ICD-10-CM

## 2023-03-06 NOTE — TELEPHONE ENCOUNTER
Spoke with patient to review recent lab work (CBC and CMP) for monitoring while on mycophenolate. Labs completed under order by pt's PCP.     Identified elevated glucose (patient instructed to follow up with PCP; has not adjusted metformin since being on prednisone and mycophenolate)    Noted increase in WBC from earlier this year, pt denies symptoms of acute infection. Notes feeling fine other than recent challenge with managing indigestion/heartburn/palpitation-type symptoms.     LFTs and metabolic panel with mild abnormalities.     Patient notes being on mycophenolate at 1000 mg BID; did not know 1500 mg BID was goal (per recent Office Visit note). Will route update to provider to confirm.     Pt remains on track with prednisone taper; presently on 15 mg daily per schedule.     Shi Rome, RN, BSN  ILD Nurse Care Coordinator  (P) [phone]

## 2023-03-07 RX ORDER — MYCOPHENOLATE MOFETIL 500 MG/1
1500 TABLET ORAL 2 TIMES DAILY
Qty: 540 TABLET | Refills: 1 | Status: SHIPPED | OUTPATIENT
Start: 2023-03-07 | End: 2023-06-06 | Stop reason: ALTCHOICE

## 2023-03-07 NOTE — TELEPHONE ENCOUNTER
Spoke with patient.   Updated about need to repeat labs; unable to add-on requested lab orders. New orders placed and will be faxed to pt's local clinic ([facility])     Informed about mycophenolate and goal dose 1500 mg BID; pt will initiate higher dose today. Updated prescription sent to requested pharmacy.     Shi Rome, RN, BSN  ILD Nurse Care Coordinator  (P) [phone]

## 2023-03-07 NOTE — TELEPHONE ENCOUNTER
Labs reviewed, I would add CRP and ionized calcium to the same samples if possible. WBC at this level while steroids are being tapered is unusual.   We would need repeat CBC with diff, CRP, CMP and ionized calcium by Friday.     Agreed to go ahead and increase dose to 1500 BID starting tomorrow.     AK

## 2023-03-08 ENCOUNTER — TELEPHONE (OUTPATIENT)
Dept: PULMONOLOGY | Facility: CLINIC | Age: 73
End: 2023-03-08
Payer: MEDICARE

## 2023-03-08 NOTE — TELEPHONE ENCOUNTER
Spoke with patient who said Nichol had not received lab fax. Pt gave RN new fax number and advised we would have orders refaxed. Pt is concerned about the snow coming and states will not drive in snow. Pt will try to get labs done on Friday per Dr. Rodriguez. [place] is also [place] [facility] [place] group and said if she could not get it done before, she will for sure have drawn Wednesday. RN spoke with Regino and requested he refax orders.   Vandana Edward RN BSN

## 2023-03-09 ENCOUNTER — TELEPHONE (OUTPATIENT)
Dept: PULMONOLOGY | Facility: CLINIC | Age: 73
End: 2023-03-09
Payer: MEDICARE

## 2023-03-09 ENCOUNTER — TELEPHONE (OUTPATIENT)
Dept: CARDIOLOGY | Facility: CLINIC | Age: 73
End: 2023-03-09
Payer: MEDICARE

## 2023-03-09 RX ORDER — ATORVASTATIN CALCIUM 10 MG/1
TABLET, FILM COATED ORAL
Qty: 90 TABLET | Refills: 3 | OUTPATIENT
Start: 2023-03-09

## 2023-03-09 NOTE — TELEPHONE ENCOUNTER
Pt called to let us know she increased her mycophenolate dose to 1500mg BID and today developed diarrhea (4 loose stools so far today) and does not want to leave the house to get the labs drawn that Dr. Rodriguez recommended having drawn asap. Also does not want to leave the house because it is snowing. Informed pt she could take imodium for the loose stools and to let us know if it becomes unbearable. [place] [facility] [place] 0900 to have cardiology labs drawn and plans to have [place] labs drawn then too.     Audra Richardson, RN, BSN  ILD Nurse   [phone]

## 2023-03-09 NOTE — TELEPHONE ENCOUNTER
Called patient to discuss pre-procedure instructions    Patient understands to hold medications Metformin  Patient will remain NPO for 6 hours prior to procedure with sips of water and discussed okay to take other medications.   Discussed location and time of procedure. Telephone follow-up scheduled with Dr. Sanchez  Patient denied further questions, verbalized understanding of instructions.    Kelli Hager RN on 3/9/2023 at 10:00 AM

## 2023-03-10 NOTE — TELEPHONE ENCOUNTER
Spoke to pt and let her know she can ask to have Dr. Rodriguez's labs drawn with the cardiology lab appt on Wed 3/15 @ 0900.    negative...

## 2023-03-14 ENCOUNTER — TELEPHONE (OUTPATIENT)
Dept: CARDIOLOGY | Facility: CLINIC | Age: 73
End: 2023-03-14
Payer: MEDICARE

## 2023-03-15 ENCOUNTER — VIRTUAL VISIT (OUTPATIENT)
Dept: CARDIOLOGY | Facility: CLINIC | Age: 73
End: 2023-03-15
Payer: MEDICARE

## 2023-03-15 ENCOUNTER — HOSPITAL ENCOUNTER (OUTPATIENT)
Dept: CARDIOLOGY | Facility: CLINIC | Age: 73
Discharge: HOME | End: 2023-03-15
Attending: PHYSICIAN ASSISTANT | Admitting: INTERNAL MEDICINE
Payer: MEDICARE

## 2023-03-15 ENCOUNTER — HOSPITAL ENCOUNTER (OUTPATIENT)
Facility: CLINIC | Age: 73
Discharge: HOME | End: 2023-03-15
Attending: INTERNAL MEDICINE | Admitting: INTERNAL MEDICINE
Payer: MEDICARE

## 2023-03-15 ENCOUNTER — APPOINTMENT (OUTPATIENT)
Dept: MEDSURG UNIT | Facility: CLINIC | Age: 73
End: 2023-03-15
Attending: INTERNAL MEDICINE
Payer: MEDICARE

## 2023-03-15 ENCOUNTER — LAB (OUTPATIENT)
Dept: LAB | Facility: CLINIC | Age: 73
End: 2023-03-15
Attending: INTERNAL MEDICINE
Payer: MEDICARE

## 2023-03-15 VITALS
WEIGHT: 132.8 LBS | HEIGHT: 62 IN | DIASTOLIC BLOOD PRESSURE: 90 MMHG | RESPIRATION RATE: 24 BRPM | OXYGEN SATURATION: 90 % | SYSTOLIC BLOOD PRESSURE: 161 MMHG | HEART RATE: 92 BPM | TEMPERATURE: 98.4 F | BODY MASS INDEX: 24.44 KG/M2

## 2023-03-15 DIAGNOSIS — I27.20 PULMONARY HYPERTENSION (H): ICD-10-CM

## 2023-03-15 DIAGNOSIS — I27.20 PULMONARY HTN (H): Primary | ICD-10-CM

## 2023-03-15 DIAGNOSIS — J84.9 ILD (INTERSTITIAL LUNG DISEASE) (H): ICD-10-CM

## 2023-03-15 DIAGNOSIS — I27.20 PULMONARY HTN (H): ICD-10-CM

## 2023-03-15 DIAGNOSIS — R06.09 DOE (DYSPNEA ON EXERTION): ICD-10-CM

## 2023-03-15 LAB
ALBUMIN SERPL BCG-MCNC: 3.8 G/DL (ref 3.5–5.2)
ALP SERPL-CCNC: 97 U/L (ref 35–104)
ALT SERPL W P-5'-P-CCNC: 31 U/L (ref 10–35)
ANION GAP SERPL CALCULATED.3IONS-SCNC: 13 MMOL/L (ref 7–15)
AST SERPL W P-5'-P-CCNC: 27 U/L (ref 10–35)
BASOPHILS # BLD AUTO: 0.1 10E3/UL (ref 0–0.2)
BASOPHILS NFR BLD AUTO: 0 %
BILIRUB SERPL-MCNC: 1.1 MG/DL
BUN SERPL-MCNC: 18 MG/DL (ref 8–23)
CA-I BLD-MCNC: 5.1 MG/DL (ref 4.4–5.2)
CALCIUM SERPL-MCNC: 9.7 MG/DL (ref 8.8–10.2)
CHLORIDE SERPL-SCNC: 104 MMOL/L (ref 98–107)
CREAT SERPL-MCNC: 0.95 MG/DL (ref 0.51–0.95)
CRP SERPL-MCNC: <3 MG/L
DEPRECATED HCO3 PLAS-SCNC: 24 MMOL/L (ref 22–29)
EOSINOPHIL # BLD AUTO: 0.1 10E3/UL (ref 0–0.7)
EOSINOPHIL NFR BLD AUTO: 1 %
ERYTHROCYTE [DISTWIDTH] IN BLOOD BY AUTOMATED COUNT: 13.9 % (ref 10–15)
GFR SERPL CREATININE-BSD FRML MDRD: 62 ML/MIN/1.73M2
GLUCOSE SERPL-MCNC: 166 MG/DL (ref 70–99)
HCT VFR BLD AUTO: 37.3 % (ref 35–47)
HGB BLD-MCNC: 11 G/DL (ref 11.7–15.7)
HGB BLD-MCNC: 11.6 G/DL (ref 11.7–15.7)
HOLD SPECIMEN: NORMAL
IMM GRANULOCYTES # BLD: 0.2 10E3/UL
IMM GRANULOCYTES NFR BLD: 2 %
INR PPP: 1.06 (ref 0.85–1.15)
LVEF ECHO: NORMAL
LYMPHOCYTES # BLD AUTO: 2.5 10E3/UL (ref 0.8–5.3)
LYMPHOCYTES NFR BLD AUTO: 19 %
MCH RBC QN AUTO: 32.3 PG (ref 26.5–33)
MCHC RBC AUTO-ENTMCNC: 31.1 G/DL (ref 31.5–36.5)
MCV RBC AUTO: 104 FL (ref 78–100)
MONOCYTES # BLD AUTO: 1.2 10E3/UL (ref 0–1.3)
MONOCYTES NFR BLD AUTO: 9 %
NEUTROPHILS # BLD AUTO: 9.3 10E3/UL (ref 1.6–8.3)
NEUTROPHILS NFR BLD AUTO: 69 %
NRBC # BLD AUTO: 0 10E3/UL
NRBC BLD AUTO-RTO: 0 /100
NT-PROBNP SERPL-MCNC: 2629 PG/ML (ref 0–900)
OXYHGB MFR BLDV: 58 % (ref 92–100)
PLATELET # BLD AUTO: 177 10E3/UL (ref 150–450)
POTASSIUM SERPL-SCNC: 3.5 MMOL/L (ref 3.4–5.3)
PROT SERPL-MCNC: 5.9 G/DL (ref 6.4–8.3)
RBC # BLD AUTO: 3.59 10E6/UL (ref 3.8–5.2)
SODIUM SERPL-SCNC: 141 MMOL/L (ref 136–145)
WBC # BLD AUTO: 13.4 10E3/UL (ref 4–11)

## 2023-03-15 PROCEDURE — 83880 ASSAY OF NATRIURETIC PEPTIDE: CPT | Performed by: INTERNAL MEDICINE

## 2023-03-15 PROCEDURE — 86140 C-REACTIVE PROTEIN: CPT

## 2023-03-15 PROCEDURE — 85018 HEMOGLOBIN: CPT | Mod: 91

## 2023-03-15 PROCEDURE — 93306 TTE W/DOPPLER COMPLETE: CPT | Mod: 26 | Performed by: INTERNAL MEDICINE

## 2023-03-15 PROCEDURE — 85610 PROTHROMBIN TIME: CPT | Performed by: INTERNAL MEDICINE

## 2023-03-15 PROCEDURE — 250N000009 HC RX 250: Performed by: INTERNAL MEDICINE

## 2023-03-15 PROCEDURE — 80053 COMPREHEN METABOLIC PANEL: CPT | Performed by: INTERNAL MEDICINE

## 2023-03-15 PROCEDURE — 999N000132 HC STATISTIC PP CARE STAGE 1

## 2023-03-15 PROCEDURE — 93306 TTE W/DOPPLER COMPLETE: CPT

## 2023-03-15 PROCEDURE — 93451 RIGHT HEART CATH: CPT | Performed by: INTERNAL MEDICINE

## 2023-03-15 PROCEDURE — 82330 ASSAY OF CALCIUM: CPT | Performed by: INTERNAL MEDICINE

## 2023-03-15 PROCEDURE — 82810 BLOOD GASES O2 SAT ONLY: CPT

## 2023-03-15 PROCEDURE — 93451 RIGHT HEART CATH: CPT | Mod: 26 | Performed by: INTERNAL MEDICINE

## 2023-03-15 PROCEDURE — 999N000142 HC STATISTIC PROCEDURE PREP ONLY

## 2023-03-15 PROCEDURE — 36415 COLL VENOUS BLD VENIPUNCTURE: CPT | Performed by: INTERNAL MEDICINE

## 2023-03-15 PROCEDURE — 99215 OFFICE O/P EST HI 40 MIN: CPT | Mod: 95 | Performed by: INTERNAL MEDICINE

## 2023-03-15 PROCEDURE — 85018 HEMOGLOBIN: CPT | Performed by: INTERNAL MEDICINE

## 2023-03-15 PROCEDURE — 272N000001 HC OR GENERAL SUPPLY STERILE: Performed by: INTERNAL MEDICINE

## 2023-03-15 RX ORDER — LIDOCAINE 40 MG/G
CREAM TOPICAL
Status: COMPLETED | OUTPATIENT
Start: 2023-03-15 | End: 2023-03-15

## 2023-03-15 RX ORDER — SILDENAFIL CITRATE 20 MG/1
20 TABLET ORAL 3 TIMES DAILY
Qty: 90 TABLET | Refills: 11 | Status: SHIPPED | OUTPATIENT
Start: 2023-03-15 | End: 2024-02-20

## 2023-03-15 RX ADMIN — LIDOCAINE: 40 CREAM TOPICAL at 11:18

## 2023-03-15 ASSESSMENT — ACTIVITIES OF DAILY LIVING (ADL)
ADLS_ACUITY_SCORE: 35

## 2023-03-15 NOTE — DISCHARGE INSTRUCTIONS
[facility]                        Interventional Cardiology  Discharge Instructions   Post Right Heart Cath      AFTER YOU GO HOME:  DO drink plenty of fluids  DO resume your regular diet and medications unless otherwise instructed by your Primary Physician  Do Not scrub the procedure site vigorously  No lotion or powder to the puncture site for 3 days    CALL YOUR PRIMARY PHYSICIAN IF: You may resume all normal activity.  Monitor neck site for bleeding, swelling, or voice changes. If you notice bleeding or swelling immediately apply pressure to the site and call number below to speak with Cardiology Fellow.  If you experience any changes in your breathing you should call your doctor immediately or come to the closest Emergency Department.  Do not drive yourself.    ADDITIONAL INSTRUCTIONS: Medications: You are to resume all home medications including anticoagulation therapy unless otherwise advised by your primary cardiologist or nurse coordinator.    Follow Up: Per your primary cardiology team    If you have any questions or concerns regarding your procedure site please call [phone] at anytime and ask for Cardiology Fellow on call.  They are available 24 hours a day.  You may also contact the Cardiology Clinic after hours number at [phone].                                                       Telephone Numbers [phone] Monday-Friday 8:00 am to 4:30 pm    [phone] After 4:30 pm Monday-Friday, Weekends & Holidays  Ask for Interventional Cardiologist on call. Someone is on call 24 hours/day   [facility] toll free number [phone] Monday-Friday 8:00 am to 4:30 pm   [facility] Emergency Dept [phone]

## 2023-03-15 NOTE — PROGRESS NOTES
03/15/2023    Dear Dr. Wise,    We had the pleasure of seeing Ms. Soliman in our pulmonary hypertension clinic at [facility].  As you know, she is a 72-year-old female with pulmonary hypertension secondary to interstitial lung disease.  She is currently on inhaled treprostinil DPI 64 mcg 4 times a day and sildenafil 20 mg 3 times a day.  She returns today for follow-up.    We diagnosed her with pulm hypertension due to ILD in August 2022.  We started her on inhaled treprostinil and sildenafil as an upfront combination therapy.  She states that she feels significantly better initially after starting these 2 therapy up until January of this year when she starting to have some worsening shortness of breath.  She definitely feels better compared to last August but worse when compared to November of last year.    She is needing 8 L of oxygen at rest.  She desaturated quickly to 60s and 70s with minimal exertion.  She is currently functional class III.  Her shortness of breath improves immediately after she takes the inhaled treprostinil DPI for several hours.  She denies having any lower extremity swelling or abdominal distention.  No exertional presyncope or syncope.  Occasional lightheadedness but no exertional dizziness.  No exertional chest pain or chest pressure.  She has not had any hospitalization or ER visit.    She has established care with our ILD clinic with Dr. Rodriguez.  She is currently on tapering dose of prednisone and mycophenolate.  Her high-resolution CT scan in November showed worsening of her interstitial lung disease compared to August of last year.  However her most recent CT scan in February after being on prednisone mycophenolate showed stabilization to mild improvement in her ILD.    Past Medical History:   Diagnosis Date     Cancer (H)      Diabetes mellitus (H)     Type 2 Diabetic     Hypertension        Past Surgical History:   Procedure Laterality Date     CV RIGHT HEART  CATH MEASUREMENTS RECORDED N/A 8/19/2022    Procedure: Heart Cath Right Heart Cath;  Surgeon: Daniel Sanchez MD;  Location:  HEART CARDIAC CATH LAB     TN MASTECTOMY,PARTIAL,  WITH AXILLARY LYMPHADENECTOMY Right 4/18/2018    Procedure: Right Lumpectomy after Wire Localization; [place] Lymph Node Biopsy;  Surgeon: Katie Sahu MD;  Location: [facility];  Service: General     TUBAL LIGATION         Current medication   Aspirin 325 mg daily   Atorvastatin 10 mg daily inhaled treprostinil DPI 64 mcg 4 times a day  Sildenafil 20 mg 3 times a day  Furosemide 20 mg as needed  Metoprolol 25 mg daily  Potassium 40 mEq daily  Tapering dose of prednisone 10 mg daily  Mycophenolate 1500 mg twice a day  Metformin 1000 mg twice a day  Dapsone 100 mg daily  CoQ 10 200 mg daily  Omeprazole 20 mg daily  Vitamin D3 2000 international units daily  Flovent inhaler to 20 mcg 1 puff 2 times daily    ROS:   Constitutional: No fever, chills, or sweats. No weight gain/loss  ENT: No visual disturbance, ear ache, epistaxis, sore throat  Allergies/Immunologic: Negative   Respiratory: She does have a dry cough on and off.  Cardiovascular: As per HPI  GI: No nausea, vomiting, hematemesis, melena, or hematochezia   : No urinary frequency, dysuria, or hematuria  Integument: Negative  Psychiatric: Negative   Neuro: Negative  Endocrinology: Negative   Musculoskeletal: Negative    EXAM:   Her blood pressure was 133/88, pulse rate was 95, respirate was 16, and oxygen saturation was 98% on 8 L of supplemental oxygen.  She weighed 132 pounds.  She was 5 feet 2 inches tall.  Her BMI was 24.3.  Her temperature was 98.4.   Time could not be done due to the telephonic nature of the visit.    Labs:    Recent Results (from the past 168 hour(s))   INR    Collection Time: 03/15/23  9:15 AM   Result Value Ref Range    INR 1.06 0.85 - 1.15   Comprehensive metabolic panel    Collection Time: 03/15/23  9:15 AM   Result Value Ref Range     Sodium 141 136 - 145 mmol/L    Potassium 3.5 3.4 - 5.3 mmol/L    Chloride 104 98 - 107 mmol/L    Carbon Dioxide (CO2) 24 22 - 29 mmol/L    Anion Gap 13 7 - 15 mmol/L    Urea Nitrogen 18.0 8.0 - 23.0 mg/dL    Creatinine 0.95 0.51 - 0.95 mg/dL    Calcium 9.7 8.8 - 10.2 mg/dL    Glucose 166 (H) 70 - 99 mg/dL    Alkaline Phosphatase 97 35 - 104 U/L    AST 27 10 - 35 U/L    ALT 31 10 - 35 U/L    Protein Total 5.9 (L) 6.4 - 8.3 g/dL    Albumin 3.8 3.5 - 5.2 g/dL    Bilirubin Total 1.1 <=1.2 mg/dL    GFR Estimate 62 >60 mL/min/1.73m2   NT probnp inpatient and ED    Collection Time: 03/15/23  9:15 AM   Result Value Ref Range    N terminal Pro BNP Inpatient 2,629 (H) 0 - 900 pg/mL   CBC with platelets and differential    Collection Time: 03/15/23  9:15 AM   Result Value Ref Range    WBC Count 13.4 (H) 4.0 - 11.0 10e3/uL    RBC Count 3.59 (L) 3.80 - 5.20 10e6/uL    Hemoglobin 11.6 (L) 11.7 - 15.7 g/dL    Hematocrit 37.3 35.0 - 47.0 %     (H) 78 - 100 fL    MCH 32.3 26.5 - 33.0 pg    MCHC 31.1 (L) 31.5 - 36.5 g/dL    RDW 13.9 10.0 - 15.0 %    Platelet Count 177 150 - 450 10e3/uL    % Neutrophils 69 %    % Lymphocytes 19 %    % Monocytes 9 %    % Eosinophils 1 %    % Basophils 0 %    % Immature Granulocytes 2 %    NRBCs per 100 WBC 0 <1 /100    Absolute Neutrophils 9.3 (H) 1.6 - 8.3 10e3/uL    Absolute Lymphocytes 2.5 0.8 - 5.3 10e3/uL    Absolute Monocytes 1.2 0.0 - 1.3 10e3/uL    Absolute Eosinophils 0.1 0.0 - 0.7 10e3/uL    Absolute Basophils 0.1 0.0 - 0.2 10e3/uL    Absolute Immature Granulocytes 0.2 <=0.4 10e3/uL    Absolute NRBCs 0.0 10e3/uL   Extra Green Top (Lithium Heparin) Tube    Collection Time: 03/15/23  9:15 AM   Result Value Ref Range    Hold Specimen JIC    CRP inflammation    Collection Time: 03/15/23  9:15 AM   Result Value Ref Range    CRP Inflammation <3.00 <5.00 mg/L   Echocardiogram Complete    Collection Time: 03/15/23 10:22 AM   Result Value Ref Range    LVEF  55-60%    Hemoglobin POCT     Collection Time: 03/15/23  2:07 PM   Result Value Ref Range    Hemoglobin POCT 11.0 (L) 11.7 - 15.7 g/dL   Oxyhemoglobin, venous POCT    Collection Time: 03/15/23  2:07 PM   Result Value Ref Range    Oxyhemoglobin Venous POCT 58 (L) 92 - 100 %   Ionized Calcium    Collection Time: 03/15/23  2:27 PM   Result Value Ref Range    Calcium Ionized 5.1 4.4 - 5.2 mg/dL     Echocardiogram (03/2023)  Global and regional left ventricular function is normal with an EF of 55-60%.  RVEDv 104.8%.RVESv 73.1ml. RV EF 30.2%.  RVFAC 25.2%. RVLS s -8.9%. RVLSfw -16.2%.  Right ventricular systolic pressure is 43mmHg above the right atrial pressure.  IVC diameter <2.1 cm collapsing >50% with sniff suggests a normal RA pressure  of 3 mmHg.  RV size,function ,PA pressure and TR all improved.    RHC (03/2022)  Right Heart Catheterization:  /80/112 mmHg   BPM    RA 8/10/7 mmHg  RV 90/7 mmHg  PA 90/29 (49) mmHg  PCW 11/11/9 mmHg  Terrance CO 3.71 L/min Normal = 4.0-8.0 L/min  Terrance CI 2.32 L/min/m2 Normal = 2.5-4.0 L/min/m2  TD CO 3.57 L/min Normal = 4.0-8.0 L/min  TD CI 2.23 L/min/m2 Normal = 2.5-4.0 L/min/m2  PA sat 58%   Hgb 11 g/dL   PVR 10.8 Woods units  SVR 1056.1 dynes-sec/cm5  Her serological work-up for pulmonary hypertension including antinuclear antibody rheumatoid factor HIV hepatitis and TSH were unremarkable..    Electrocardiogram (07/2022):  Sinus rhythm with left anterior fascicular block    Echocardiogram (08/2022):  Left ventricular function is normal.The ejection fraction is 60-65%.  Paradoxical septal motion consistent with right ventricular pressure and  volume overload is present.  Moderate to severe right ventricular dilation is present. The RV function is  moderately to severely reduced. The RVFAC is 30%, the TAPSE is 1.6 cm, and the  RV S' is 6 cm/s.  There is early shunting of saline on the bubble study. This is consistent with  an intracardiac shunt, likely due to a patent foramen ovale.  Moderate to severe  tricuspid insufficiency is present.  The estimated PA systolic pressure is at least 92 mmHg.  There is mild dilation of the ascending aorta (3.7 cm, indexed value 2.31  cm/m2).  IVC diameter >2.1 cm collapsing <50% with sniff suggests a high RA pressure  estimated at 15 mmHg or greater.  No pericardial effusion is present.  There is no prior study for direct comparison.    RHC (08/2022)  Baseline Hemodynamics:  RA 9/16/9 mmHg  RV 65/11 mmHg  PA 64/20/37 mmHg  CWP 21/22/13 mmHg  CO (Terrance) 1.77 L/min  CI (Terrance) 1.11 L/min/m2  CO (TD) 2.45 L/min  CI (TD) 1.54 L/min/m2   mmHg   SVR 4,338 dynes (by measured Terrance)  PVR 13.56 GRAFF (by measured Terrance)  PVR 9.8 (by TD)    During Nitric Oxide:  RA 12/8/7 mmHg  PA 49/17/30 mmHg  CWP 4/4/4 mmHg  CO (TD) 3.4 L/min  CI (TD) 2.13 L/min/m2  Calculated cardiac output and index not included as the baseline hemodynamics were performed using measured Terrance.    mmHg   SVR 2,188 dynes  PVR 7.64 GRAFF    [place] saturations:   SVC: 59%  IVC: 72% (syringe in contact with room air prior to processing)   RA: 56%  RV: 55%  PA: 56%    Right sided filling pressures are mildly elevated. Left sided filling pressures are mildly elevated. Moderately elevated pulmonary artery hypertension. Reduced cardiac output level.    Pulmonary function test (August 2022)  Pulmonary function tests showed an FVC of 73% predicted, FEV1 of 81% predicted, FEV1 by FVC  of 78% predicted, TLC of 83% predicted, and DLCO of 36% predicted.    Dual-energy CT PE (August 2022)  Dual-energy CT pulmonary angiogram showed no evidence of chronic thromboembolic pulm hypertension.  Patient had evidence of usual interstitial pneumonitis.    6MWT (08/2022)  On her 6-minute walk test she walked 168 m only.  She required 4 L of supplemental oxygen to maintain a saturation of 90%.    Assessment and Plan:   In summary, Ms. Soliman is a very delightful 73-year-old female with pulmonary hypertension due to interstitial lung disease  who returns today for follow-up.  She is currently on inhaled treprostinil DPI 64 mcg 4 times a day and sildenafil 20 mg 3 times a day.    1. Pulmonary arterial hypertension out of proportion to interstitial lung disease:     Clinically, she states that she felt better initially after the combination therapy but has now been feeling worse in the last 2 months.  Her oxygen requirements are also significantly on the higher side.  She is requiring 8 L of oxygen at rest and more with exertion.   Clinically, she is not in right heart failure.    Interestingly, her NT proBNP has significantly improved albeit still elevated.  Her echocardiogram shows significant improvement in her right ventricular size and function.  Her right ventricle is only mildly dilated with mildly reduced function.  On her repeat right heart catheterization her PVR is now close to 10 Wood units which is lower than her baseline at 13 Wood units.  Her cardiac output has also improved.    It appears that she is responding to pulmonary vasodilator therapy with improvement in right ventricular size and function and cardiac output however at the same time she is having progression of her underlying interstitial lung disease and pulmonary hypertension.  Alternatively, her high PA pressure today could also be due to very high systemic blood pressure at the time of her right heart catheterization.  This is not very common.  I suspect her high PA pressure is most likely due to progression of her underlying interstitial lung disease.    As her PVR is still on the higher side, we will increase her inhaled treprostinil to 80 mcg 4 times a day.  She will continue on sildenafil 20 mg 3 times a day.  She is taking furosemide 20 mg once a day as needed.  I have recommended to continue supplemental oxygen 8 L at rest and 8 to 10 L with exertion.  We will defer digoxin as she has had some SVTs and VT on her Holter monitor.  She is on a low-dose of metoprolol which we  will continue for her SVT.    2.  Interstitial lung disease - This is managed by Dr. Rodriguez.  She is on tapering dose of prednisone and mycophenolate.    3. SVT - on low dose metoprolol.    She will return to see my colleague Janet Duke in 6 weeks and me in 3 months.  We will repeat labs and 6-minute walk test with her next clinic visit with me..  She will call us in the interim should any further worsening symptoms.    It was a pleasure meeting Ms. Soliman in our pulmonary hypertension clinic continues to [facility].  We thank you for involving us in her care.    This is a telephone visit of 40 minutes duration with more than 50% of the time spent in counseling the patient.     Sincerely,  Daniel Sanchez MD   [facility]  Heart Failure, Transplant, and Mechanical Circulatory Support Cardiology   Cardiovascular Division  [facility]   [phone]

## 2023-03-15 NOTE — PROGRESS NOTES
Patient arrived to room via wheelchair with Lima GILLETTE s/p RHC. VSS ex patient requires 8L oxygen to maintain sats at baseline. Denies/report pain. Pt alert and oriented x4. RIJ site with LISA litlte. Discharge instructions reviewed with patient and marge Wheeler at bedside, educated patient on when to seek medical attention. Patient transported to front door via wheelchair.

## 2023-03-15 NOTE — PATIENT INSTRUCTIONS
You were seen today in the Advanced Heart Failure Clinic at [facility].       Cardiology Providers you saw during your visit: Dr. Sanchez         Medication Changes:   1. We will start a prior authorization for Tyvaso 80mg (32mcg and 48mcg combination- this would be 2 puffs four times daily        Follow up Appointment Information:  1. Virtual viti with Janet Duke in 6 weeks  2. Follow-up with Dr. Sanchez in 3 months with labs and 6 minute walk test prior          Thank you for allowing us to be a part of your care here at the [facility]      If you have questions or concerns please contact us at:    Nurse Coordinator: Kelli Hager RN -[phone]  Cardiovascular Clinic  [facility]   [phone] Press #1 to send a message to your care team  On Call Cardiologist for after hours or on weekends: [phone]  option #4      *All co-payments are due at the time of services, if financial concerns are keeping you from attending scheduled clinic visits please contact our financial counselors at [phone] for further assistance.   * Please note that you will NOT receive a reminder call regarding your scheduled testing, reminder calls are for provider appointments only.  If you are scheduled for testing within the [facility] system you may receive a call regarding pre-registration for billing purposes only.**

## 2023-03-15 NOTE — PROGRESS NOTES
Pt arrived to 2A from home for RHC. VSS, patient on 8L oxygen at baseline. Denies pain. Awaiting consent. Lab resulted, ex patient needs ionized calcium drawn. H&P updated 2/1/23. Allergies reviewed with pt. Appropriately NPO. Prep completed. Juan Wheeler at bedside; will be transporting patient home.

## 2023-03-15 NOTE — LETTER
3/15/2023    Meg Kahn MD  [address] [facility] [address]  [facility] [address]    RE: Patty Soliman       Dear Colleague,     I had the pleasure of seeing Patty Soliman in the [facility].  03/15/2023    Dear Dr. Wise,    We had the pleasure of seeing Ms. Soliman in our pulmonary hypertension clinic at [facility].  As you know, she is a 72-year-old female with pulmonary hypertension secondary to interstitial lung disease.  She is currently on inhaled treprostinil DPI 64 mcg 4 times a day and sildenafil 20 mg 3 times a day.  She returns today for follow-up.    We diagnosed her with pulm hypertension due to ILD in August 2022.  We started her on inhaled treprostinil and sildenafil as an upfront combination therapy.  She states that she feels significantly better initially after starting these 2 therapy up until January of this year when she starting to have some worsening shortness of breath.  She definitely feels better compared to last August but worse when compared to November of last year.    She is needing 8 L of oxygen at rest.  She desaturated quickly to 60s and 70s with minimal exertion.  She is currently functional class III.  Her shortness of breath improves immediately after she takes the inhaled treprostinil DPI for several hours.  She denies having any lower extremity swelling or abdominal distention.  No exertional presyncope or syncope.  Occasional lightheadedness but no exertional dizziness.  No exertional chest pain or chest pressure.  She has not had any hospitalization or ER visit.    She has established care with our ILD clinic with Dr. Rodriguez.  She is currently on tapering dose of prednisone and mycophenolate.  Her high-resolution CT scan in November showed worsening of her interstitial lung disease compared to August of last year.  However her most recent CT scan in February after being on prednisone mycophenolate showed stabilization to mild improvement  in her ILD.    Past Medical History:   Diagnosis Date     Cancer (H)      Diabetes mellitus (H)     Type 2 Diabetic     Hypertension        Past Surgical History:   Procedure Laterality Date     CV RIGHT HEART CATH MEASUREMENTS RECORDED N/A 8/19/2022    Procedure: Heart Cath Right Heart Cath;  Surgeon: Daniel Sanchez MD;  Location:  HEART CARDIAC CATH LAB     UT MASTECTOMY,PARTIAL,  WITH AXILLARY LYMPHADENECTOMY Right 4/18/2018    Procedure: Right Lumpectomy after Wire Localization; Jackson Lymph Node Biopsy;  Surgeon: Katie Sahu MD;  Location: [facility];  Service: General     TUBAL LIGATION         Current medication   Aspirin 325 mg daily   Atorvastatin 10 mg daily inhaled treprostinil DPI 64 mcg 4 times a day  Sildenafil 20 mg 3 times a day  Furosemide 20 mg as needed  Metoprolol 25 mg daily  Potassium 40 mEq daily  Tapering dose of prednisone 10 mg daily  Mycophenolate 1500 mg twice a day  Metformin 1000 mg twice a day  Dapsone 100 mg daily  CoQ 10 200 mg daily  Omeprazole 20 mg daily  Vitamin D3 2000 international units daily  Flovent inhaler to 20 mcg 1 puff 2 times daily    ROS:   Constitutional: No fever, chills, or sweats. No weight gain/loss  ENT: No visual disturbance, ear ache, epistaxis, sore throat  Allergies/Immunologic: Negative   Respiratory: She does have a dry cough on and off.  Cardiovascular: As per HPI  GI: No nausea, vomiting, hematemesis, melena, or hematochezia   : No urinary frequency, dysuria, or hematuria  Integument: Negative  Psychiatric: Negative   Neuro: Negative  Endocrinology: Negative   Musculoskeletal: Negative    EXAM:   Her blood pressure was 133/88, pulse rate was 95, respirate was 16, and oxygen saturation was 98% on 8 L of supplemental oxygen.  She weighed 132 pounds.  She was 5 feet 2 inches tall.  Her BMI was 24.3.  Her temperature was 98.4.   Time could not be done due to the telephonic nature of the visit.    Labs:    Recent Results (from the past  168 hour(s))   INR    Collection Time: 03/15/23  9:15 AM   Result Value Ref Range    INR 1.06 0.85 - 1.15   Comprehensive metabolic panel    Collection Time: 03/15/23  9:15 AM   Result Value Ref Range    Sodium 141 136 - 145 mmol/L    Potassium 3.5 3.4 - 5.3 mmol/L    Chloride 104 98 - 107 mmol/L    Carbon Dioxide (CO2) 24 22 - 29 mmol/L    Anion Gap 13 7 - 15 mmol/L    Urea Nitrogen 18.0 8.0 - 23.0 mg/dL    Creatinine 0.95 0.51 - 0.95 mg/dL    Calcium 9.7 8.8 - 10.2 mg/dL    Glucose 166 (H) 70 - 99 mg/dL    Alkaline Phosphatase 97 35 - 104 U/L    AST 27 10 - 35 U/L    ALT 31 10 - 35 U/L    Protein Total 5.9 (L) 6.4 - 8.3 g/dL    Albumin 3.8 3.5 - 5.2 g/dL    Bilirubin Total 1.1 <=1.2 mg/dL    GFR Estimate 62 >60 mL/min/1.73m2   NT probnp inpatient and ED    Collection Time: 03/15/23  9:15 AM   Result Value Ref Range    N terminal Pro BNP Inpatient 2,629 (H) 0 - 900 pg/mL   CBC with platelets and differential    Collection Time: 03/15/23  9:15 AM   Result Value Ref Range    WBC Count 13.4 (H) 4.0 - 11.0 10e3/uL    RBC Count 3.59 (L) 3.80 - 5.20 10e6/uL    Hemoglobin 11.6 (L) 11.7 - 15.7 g/dL    Hematocrit 37.3 35.0 - 47.0 %     (H) 78 - 100 fL    MCH 32.3 26.5 - 33.0 pg    MCHC 31.1 (L) 31.5 - 36.5 g/dL    RDW 13.9 10.0 - 15.0 %    Platelet Count 177 150 - 450 10e3/uL    % Neutrophils 69 %    % Lymphocytes 19 %    % Monocytes 9 %    % Eosinophils 1 %    % Basophils 0 %    % Immature Granulocytes 2 %    NRBCs per 100 WBC 0 <1 /100    Absolute Neutrophils 9.3 (H) 1.6 - 8.3 10e3/uL    Absolute Lymphocytes 2.5 0.8 - 5.3 10e3/uL    Absolute Monocytes 1.2 0.0 - 1.3 10e3/uL    Absolute Eosinophils 0.1 0.0 - 0.7 10e3/uL    Absolute Basophils 0.1 0.0 - 0.2 10e3/uL    Absolute Immature Granulocytes 0.2 <=0.4 10e3/uL    Absolute NRBCs 0.0 10e3/uL   Extra Green Top (Lithium Heparin) Tube    Collection Time: 03/15/23  9:15 AM   Result Value Ref Range    Hold Specimen JIC    CRP inflammation    Collection Time: 03/15/23   9:15 AM   Result Value Ref Range    CRP Inflammation <3.00 <5.00 mg/L   Echocardiogram Complete    Collection Time: 03/15/23 10:22 AM   Result Value Ref Range    LVEF  55-60%    Hemoglobin POCT    Collection Time: 03/15/23  2:07 PM   Result Value Ref Range    Hemoglobin POCT 11.0 (L) 11.7 - 15.7 g/dL   Oxyhemoglobin, venous POCT    Collection Time: 03/15/23  2:07 PM   Result Value Ref Range    Oxyhemoglobin Venous POCT 58 (L) 92 - 100 %   Ionized Calcium    Collection Time: 03/15/23  2:27 PM   Result Value Ref Range    Calcium Ionized 5.1 4.4 - 5.2 mg/dL     Echocardiogram (03/2023)  Global and regional left ventricular function is normal with an EF of 55-60%.  RVEDv 104.8%.RVESv 73.1ml. RV EF 30.2%.  RVFAC 25.2%. RVLS s -8.9%. RVLSfw -16.2%.  Right ventricular systolic pressure is 43mmHg above the right atrial pressure.  IVC diameter <2.1 cm collapsing >50% with sniff suggests a normal RA pressure  of 3 mmHg.  RV size,function ,PA pressure and TR all improved.    RHC (03/2022)  Right Heart Catheterization:  /80/112 mmHg   BPM    RA 8/10/7 mmHg  RV 90/7 mmHg  PA 90/29 (49) mmHg  PCW 11/11/9 mmHg  Terrance CO 3.71 L/min Normal = 4.0-8.0 L/min  Terrance CI 2.32 L/min/m2 Normal = 2.5-4.0 L/min/m2  TD CO 3.57 L/min Normal = 4.0-8.0 L/min  TD CI 2.23 L/min/m2 Normal = 2.5-4.0 L/min/m2  PA sat 58%   Hgb 11 g/dL   PVR 10.8 Woods units  SVR 1056.1 dynes-sec/cm5  Her serological work-up for pulmonary hypertension including antinuclear antibody rheumatoid factor HIV hepatitis and TSH were unremarkable..    Electrocardiogram (07/2022):  Sinus rhythm with left anterior fascicular block    Echocardiogram (08/2022):  Left ventricular function is normal.The ejection fraction is 60-65%.  Paradoxical septal motion consistent with right ventricular pressure and  volume overload is present.  Moderate to severe right ventricular dilation is present. The RV function is  moderately to severely reduced. The RVFAC is 30%, the TAPSE is  1.6 cm, and the  RV S' is 6 cm/s.  There is early shunting of saline on the bubble study. This is consistent with  an intracardiac shunt, likely due to a patent foramen ovale.  Moderate to severe tricuspid insufficiency is present.  The estimated PA systolic pressure is at least 92 mmHg.  There is mild dilation of the ascending aorta (3.7 cm, indexed value 2.31  cm/m2).  IVC diameter >2.1 cm collapsing <50% with sniff suggests a high RA pressure  estimated at 15 mmHg or greater.  No pericardial effusion is present.  There is no prior study for direct comparison.    RHC (08/2022)  Baseline Hemodynamics:  RA 9/16/9 mmHg  RV 65/11 mmHg  PA 64/20/37 mmHg  CWP 21/22/13 mmHg  CO (Terrance) 1.77 L/min  CI (Terrance) 1.11 L/min/m2  CO (TD) 2.45 L/min  CI (TD) 1.54 L/min/m2   mmHg   SVR 4,338 dynes (by measured Terrance)  PVR 13.56 GRAFF (by measured Terrance)  PVR 9.8 (by TD)    During Nitric Oxide:  RA 12/8/7 mmHg  PA 49/17/30 mmHg  CWP 4/4/4 mmHg  CO (TD) 3.4 L/min  CI (TD) 2.13 L/min/m2  Calculated cardiac output and index not included as the baseline hemodynamics were performed using measured Terrance.    mmHg   SVR 2,188 dynes  PVR 7.64 GRAFF    [place] saturations:   SVC: 59%  IVC: 72% (syringe in contact with room air prior to processing)   RA: 56%  RV: 55%  PA: 56%    Right sided filling pressures are mildly elevated. Left sided filling pressures are mildly elevated. Moderately elevated pulmonary artery hypertension. Reduced cardiac output level.    Pulmonary function test (August 2022)  Pulmonary function tests showed an FVC of 73% predicted, FEV1 of 81% predicted, FEV1 by FVC  of 78% predicted, TLC of 83% predicted, and DLCO of 36% predicted.    Dual-energy CT PE (August 2022)  Dual-energy CT pulmonary angiogram showed no evidence of chronic thromboembolic pulm hypertension.  Patient had evidence of usual interstitial pneumonitis.    6MWT (08/2022)  On her 6-minute walk test she walked 168 m only.  She required 4 L of  supplemental oxygen to maintain a saturation of 90%.    Assessment and Plan:   In summary, Ms. Soliman is a very delightful 73-year-old female with pulmonary hypertension due to interstitial lung disease who returns today for follow-up.  She is currently on inhaled treprostinil DPI 64 mcg 4 times a day and sildenafil 20 mg 3 times a day.    1. Pulmonary arterial hypertension out of proportion to interstitial lung disease:     Clinically, she states that she felt better initially after the combination therapy but has now been feeling worse in the last 2 months.  Her oxygen requirements are also significantly on the higher side.  She is requiring 8 L of oxygen at rest and more with exertion.   Clinically, she is not in right heart failure.    Interestingly, her NT proBNP has significantly improved albeit still elevated.  Her echocardiogram shows significant improvement in her right ventricular size and function.  Her right ventricle is only mildly dilated with mildly reduced function.  On her repeat right heart catheterization her PVR is now close to 10 Wood units which is lower than her baseline at 13 Wood units.  Her cardiac output has also improved.    It appears that she is responding to pulmonary vasodilator therapy with improvement in right ventricular size and function and cardiac output however at the same time she is having progression of her underlying interstitial lung disease and pulmonary hypertension.  Alternatively, her high PA pressure today could also be due to very high systemic blood pressure at the time of her right heart catheterization.  This is not very common.  I suspect her high PA pressure is most likely due to progression of her underlying interstitial lung disease.    As her PVR is still on the higher side, we will increase her inhaled treprostinil to 80 mcg 4 times a day.  She will continue on sildenafil 20 mg 3 times a day.  She is taking furosemide 20 mg once a day as needed.  I have  recommended to continue supplemental oxygen 8 L at rest and 8 to 10 L with exertion.  We will defer digoxin as she has had some SVTs and VT on her Holter monitor.  She is on a low-dose of metoprolol which we will continue for her SVT.    2.  Interstitial lung disease - This is managed by Dr. Rodriguez.  She is on tapering dose of prednisone and mycophenolate.    3. SVT - on low dose metoprolol.    She will return to see my colleague Janet Duke in 6 weeks and me in 3 months.  We will repeat labs and 6-minute walk test with her next clinic visit with me..  She will call us in the interim should any further worsening symptoms.    It was a pleasure meeting Ms. Soliman in our pulmonary hypertension clinic continues to [facility].  We thank you for involving us in her care.    This is a telephone visit of 40 minutes duration with more than 50% of the time spent in counseling the patient.     Sincerely,  Daniel Sanchez MD   [facility]  Heart Failure, Transplant, and Mechanical Circulatory Support Cardiology   Cardiovascular Division  [facility]   [phone]        Thank you for allowing me to participate in the care of your patient.      Sincerely,     Daniel Sanchez MD     [facility]  cc:   No referring provider defined for this encounter.

## 2023-03-16 ENCOUNTER — TELEPHONE (OUTPATIENT)
Dept: CARDIOLOGY | Facility: CLINIC | Age: 73
End: 2023-03-16
Payer: MEDICARE

## 2023-03-16 ASSESSMENT — ACTIVITIES OF DAILY LIVING (ADL)
ADLS_ACUITY_SCORE: 35

## 2023-03-16 NOTE — TELEPHONE ENCOUNTER
3/20/2023  @ 5:35 PM - Resent via CMM as no response received as of 3/20/23   Xin FONSECA CMA- Prior Auths  Cardiology/Pulmonary Hypertension       3/16/2023  @ 8:08 AM -  Tyvaso DPI 80 mcg -     PA Initiation  Medication: Tyvaso DPI - 80 mcg (32 mcg and 28 mcg)   Insurance Company: [employer] - Phone [phone] Fax [phone]  Pharmacy Filling the Rx: [place], [place] - [address] [place] [address]  Filling Pharmacy Phone:    Filling Pharmacy Fax:    Start Date: 3/16/2023  via [facility], key [id number], w/ RHC dated : 03/15/23; Dx Code: I27.2 (OOP TO ILD).       Quantity Limit Formulary Exception - A drug is needed with a dosage and/or duration limit: 1) If the plan limits the number of doses and/or the duration, tell the plan why the patient needs more of the restricted drug:   Patty is responding to pulmonary vasodilator therapy with improvement in right ventricular size and function and cardiac output however at the same time she is having progression of her underlying interstitial lung disease and pulmonary hypertension.  As her PVR is still on the higher side, we will increase her inhaled treprostinil to 80 mcg 4 times a day.  She will continue on sildenafil 20 mg 3 times a day.    ----------------------------  Insurance: [employer]  BIN: [id number]  PCN: [id number]  ID#: [id number]  GRP#: [id number]        Xin FONSECA CMA- Prior Auths  Cardiology/Pulmonary Hypertension

## 2023-03-17 ASSESSMENT — ACTIVITIES OF DAILY LIVING (ADL)
ADLS_ACUITY_SCORE: 35

## 2023-03-23 NOTE — TELEPHONE ENCOUNTER
3/23/2023  @ 4:32 PM - Tyvaso DPI 80 mcg dosing - APPROVED - PA approval on file - Case #:  [id number],      Prior Authorization Approval    Authorization Effective Date: 3/16/2023  Authorization Expiration Date: 3/16/2039  Medication: Tyvaso DPI - 80 mcg (32 mcg and 28 mcg)   Approved Dose/Quantity: *  Reference #: Case #:  [id number]   Insurance Company: [employer] - Phone [phone] Fax [phone]  Expected CoPay:       CoPay Card Available:      Foundation Assistance Needed:    Which Pharmacy is filling the prescription (Not needed for infusion/clinic administered): [facility] - [place], [place] - [address] [place] [address]    Updated Snapshot, added to PA Harish FONSECA CMA- Prior Auths  Cardiology/Pulmonary Hypertension

## 2023-03-25 DIAGNOSIS — Z79.2 PROPHYLACTIC ANTIBIOTIC: ICD-10-CM

## 2023-03-27 RX ORDER — DAPSONE 100 MG/1
TABLET ORAL
Qty: 30 TABLET | Refills: 1 | Status: SHIPPED | OUTPATIENT
Start: 2023-03-27 | End: 2023-04-19

## 2023-04-07 NOTE — TELEPHONE ENCOUNTER
Called in verbal order to [facility] for 32mcg/48mcg combo to equal 80mcg QID Lachelle Hager RN on 4/7/2023 at 12:33 PM

## 2023-04-17 ENCOUNTER — TELEPHONE (OUTPATIENT)
Dept: CARDIOLOGY | Facility: CLINIC | Age: 73
End: 2023-04-17

## 2023-04-17 NOTE — TELEPHONE ENCOUNTER
Called patient to follow-up if she started the increased dose of Tyvaso 80mcg DPI. Patient has not received the combo pack from [employer] yet at this time.     Called [facility] to follow-up on this and there is a shortage of the combo packs and they are hoping to get more information this week from [employer] on timing of getting more of these combo packs.     Kelli Hager RN on 4/17/2023 at 3:09 PM

## 2023-04-19 DIAGNOSIS — Z79.2 PROPHYLACTIC ANTIBIOTIC: ICD-10-CM

## 2023-04-19 RX ORDER — DAPSONE 100 MG/1
TABLET ORAL
Qty: 30 TABLET | Refills: 1 | Status: SHIPPED | OUTPATIENT
Start: 2023-04-19 | End: 2023-06-09

## 2023-04-28 NOTE — H&P
March 15, 2023    Patty Soliman is a 74 y/o lady, patient of Dr. Sanchez with PH secondary to interstitial lung disease who was referred to the cath lab for invasive hemodynamic evaluation. Note some ongoig SOB.      PAST MEDICAL HISTORY:  Past Medical History:   Diagnosis Date     Cancer (H)      Diabetes mellitus (H)     Type 2 Diabetic     Hypertension      FAMILY HISTORY:  History reviewed. No pertinent family history.  SOCIAL HISTORY:  Social History     Socioeconomic History     Marital status:      Spouse name: None     Number of children: None     Years of education: None     Highest education level: None   Tobacco Use     Smoking status: Never     Smokeless tobacco: Never   Substance and Sexual Activity     Alcohol use: Yes     Comment: Alcoholic Drinks/day: rarely     Drug use: No     CURRENT MEDICATIONS:  No current facility-administered medications for this encounter.     Current Outpatient Medications   Medication     aspirin 325 MG tablet     atorvastatin (LIPITOR) 10 MG tablet     cholecalciferol, vitamin D3, 1,000 unit tablet     coenzyme Q10 200 mg capsule     dorzolamide-timolol (COSOPT) 22.3-6.8 mg/mL ophthalmic solution     fluticasone (FLOVENT HFA) 220 MCG/ACT inhaler     furosemide (LASIX) 20 MG tablet     latanoprost (XALATAN) 0.005 % ophthalmic solution     metFORMIN (GLUCOPHAGE) 500 MG tablet     metoprolol tartrate (LOPRESSOR) 25 MG tablet     mycophenolate (GENERIC EQUIVALENT) 500 MG tablet     omeprazole (PRILOSEC) 20 MG capsule     potassium chloride ER (KLOR-CON M) 20 MEQ CR tablet     predniSONE (DELTASONE) 5 MG tablet     Treprostinil 64 MCG POWD     dapsone (ACZONE) 100 MG tablet     sildenafil (REVATIO) 20 MG tablet     ROS:   Constitutional: No fever, chills, or sweats. Weight is 132 lbs 12.8 oz  ENT: No visual disturbance, ear ache, epistaxis, sore throat.   Allergies/Immunologic: Negative.   Respiratory: No cough, hemoptysis.   Cardiovascular: As per HPI.   GI: No nausea,  "vomiting, hematemesis, melena, or hematochezia.   : No urinary frequency, dysuria, or hematuria.   Integument: Negative.   Psychiatric: Pleasant, no major depression noted  Neuro: No focal neurological deficits noted  Endocrinology: Negative.   Musculoskeletal: As per HPI.      EXAM:  BP (!) 161/90   Pulse 92   Temp 98.4  F (36.9  C)   Resp 24   Ht 1.575 m (5' 2\")   Wt 60.2 kg (132 lb 12.8 oz)   SpO2 90%   BMI 24.29 kg/m    General: appears comfortable, alert and oriented  Head: normocephalic, atraumatic  Eyes: anicteric sclera, EOMI , PERRL  Neck: no adenopathy  Orophyarynx: moist mucosa, no lesions noted  Heart: regular, S1/S2, no murmurs, rubs or gallop. Estimated JVP at 5 cmH2O  Lungs: CTAB, No wheezing.   Abdomen: soft, non-tender, bowel sounds present, no hepatosplenomegaly  Extremities: No LE edema today  Skin: no open lesions noted  Neuro: grossly non-focal     Labs:  Lab Results   Component Value Date    WBC 13.4 (H) 03/15/2023    HGB 11.0 (L) 03/15/2023    HCT 37.3 03/15/2023     03/15/2023     03/15/2023    POTASSIUM 3.5 03/15/2023    CHLORIDE 104 03/15/2023    CO2 24 03/15/2023    BUN 18.0 03/15/2023    CR 0.95 03/15/2023     (H) 03/15/2023    NTBNPI 2,629 (H) 03/15/2023    NTBNP 2,452 (H) 01/06/2023    AST 27 03/15/2023    ALT 31 03/15/2023    ALKPHOS 97 03/15/2023    BILITOTAL 1.1 03/15/2023    INR 1.06 03/15/2023      ASSESSMENT AND PLAN:  In summary, patient is a 73 year old lady with interstitial aishwarya disease and secondary pulmonary hypertension who was referred to the cath lab for invasive hemodynamic evaluation.   Plan: proceed with cath lab study     Bernardo Suarez MD  [facility] Division of Cardiology         "

## 2023-04-29 DIAGNOSIS — J84.9 ILD (INTERSTITIAL LUNG DISEASE) (H): ICD-10-CM

## 2023-05-02 ENCOUNTER — TELEPHONE (OUTPATIENT)
Dept: PULMONOLOGY | Facility: CLINIC | Age: 73
End: 2023-05-02
Payer: MEDICARE

## 2023-05-02 RX ORDER — PREDNISONE 5 MG/1
TABLET ORAL
Qty: 261 TABLET | Refills: 0 | Status: SHIPPED | OUTPATIENT
Start: 2023-05-02 | End: 2023-08-17

## 2023-05-02 NOTE — TELEPHONE ENCOUNTER
" Pt called in stating since increasing her Mycophenolate she has noticed increase in 1500mg BID has experienced more \"heart palpations\" indigestion and over all upset stomach. She has taken OTC's to help with indigestion/upset stomach with little to no help.     She is tapering off Prednisone as you instructed however, pt states can tell she is not feeling as well as he had on higher dose of prednisone.     Her oxygen saturations are dipping more when she gets up to use bathroom. She is on 9-10 liters of O2.     Pt does not believe it is related to her heart as she was just seen by Cardiology. She is looking for insight.   Message sent to MD to advise.   Vandana Edward RN BSN  "

## 2023-05-02 NOTE — TELEPHONE ENCOUNTER
Shannan Rodriguez MD P Interstitial Lung Disease Clinic Nurses-  - Reduce cellcept to 1000 in AM and 1500 at bedtime.   - Increase prednisone by 5 mg above current dose.   - Call to check back on symptoms on Friday.     AK   RN spoke with pt over phone and updated plan. Pt verbalized understanding and will call to follow up Friday. RN also sent pt info via [employer].   Vandana Edward RN BSN

## 2023-06-02 ENCOUNTER — OFFICE VISIT (OUTPATIENT)
Dept: PULMONOLOGY | Facility: CLINIC | Age: 73
End: 2023-06-02
Attending: INTERNAL MEDICINE
Payer: MEDICARE

## 2023-06-02 ENCOUNTER — LAB (OUTPATIENT)
Dept: LAB | Facility: CLINIC | Age: 73
End: 2023-06-02
Payer: MEDICARE

## 2023-06-02 VITALS
DIASTOLIC BLOOD PRESSURE: 79 MMHG | RESPIRATION RATE: 17 BRPM | OXYGEN SATURATION: 95 % | WEIGHT: 133 LBS | HEART RATE: 92 BPM | BODY MASS INDEX: 24.48 KG/M2 | SYSTOLIC BLOOD PRESSURE: 151 MMHG | HEIGHT: 62 IN

## 2023-06-02 DIAGNOSIS — J84.9 ILD (INTERSTITIAL LUNG DISEASE) (H): ICD-10-CM

## 2023-06-02 DIAGNOSIS — I27.21 PAH (PULMONARY ARTERY HYPERTENSION) (H): ICD-10-CM

## 2023-06-02 DIAGNOSIS — J84.9 ILD (INTERSTITIAL LUNG DISEASE) (H): Primary | ICD-10-CM

## 2023-06-02 DIAGNOSIS — I27.20 PULMONARY HTN (H): ICD-10-CM

## 2023-06-02 DIAGNOSIS — R06.09 DOE (DYSPNEA ON EXERTION): ICD-10-CM

## 2023-06-02 LAB
ALBUMIN SERPL BCG-MCNC: 3.9 G/DL (ref 3.5–5.2)
ALP SERPL-CCNC: 93 U/L (ref 35–104)
ALT SERPL W P-5'-P-CCNC: 19 U/L (ref 10–35)
ANION GAP SERPL CALCULATED.3IONS-SCNC: 10 MMOL/L (ref 7–15)
AST SERPL W P-5'-P-CCNC: 18 U/L (ref 10–35)
BASOPHILS # BLD AUTO: 0.1 10E3/UL (ref 0–0.2)
BASOPHILS NFR BLD AUTO: 0 %
BILIRUB SERPL-MCNC: 1 MG/DL
BUN SERPL-MCNC: 16.9 MG/DL (ref 8–23)
CA-I BLD-MCNC: 4.8 MG/DL (ref 4.4–5.2)
CALCIUM SERPL-MCNC: 10 MG/DL (ref 8.8–10.2)
CHLORIDE SERPL-SCNC: 100 MMOL/L (ref 98–107)
CREAT SERPL-MCNC: 0.91 MG/DL (ref 0.51–0.95)
CRP SERPL-MCNC: <3 MG/L
DEPRECATED HCO3 PLAS-SCNC: 29 MMOL/L (ref 22–29)
EOSINOPHIL # BLD AUTO: 0.3 10E3/UL (ref 0–0.7)
EOSINOPHIL NFR BLD AUTO: 2 %
ERYTHROCYTE [DISTWIDTH] IN BLOOD BY AUTOMATED COUNT: 14.5 % (ref 10–15)
GFR SERPL CREATININE-BSD FRML MDRD: 65 ML/MIN/1.73M2
GLUCOSE SERPL-MCNC: 160 MG/DL (ref 70–99)
HCT VFR BLD AUTO: 35.6 % (ref 35–47)
HGB BLD-MCNC: 11.2 G/DL (ref 11.7–15.7)
IMM GRANULOCYTES # BLD: 0.2 10E3/UL
IMM GRANULOCYTES NFR BLD: 1 %
LYMPHOCYTES # BLD AUTO: 3 10E3/UL (ref 0.8–5.3)
LYMPHOCYTES NFR BLD AUTO: 22 %
MCH RBC QN AUTO: 31.4 PG (ref 26.5–33)
MCHC RBC AUTO-ENTMCNC: 31.5 G/DL (ref 31.5–36.5)
MCV RBC AUTO: 100 FL (ref 78–100)
MONOCYTES # BLD AUTO: 1.4 10E3/UL (ref 0–1.3)
MONOCYTES NFR BLD AUTO: 10 %
NEUTROPHILS # BLD AUTO: 8.8 10E3/UL (ref 1.6–8.3)
NEUTROPHILS NFR BLD AUTO: 65 %
NRBC # BLD AUTO: 0 10E3/UL
NRBC BLD AUTO-RTO: 0 /100
NT-PROBNP SERPL-MCNC: 1744 PG/ML (ref 0–900)
PLATELET # BLD AUTO: 197 10E3/UL (ref 150–450)
POTASSIUM SERPL-SCNC: 3.4 MMOL/L (ref 3.4–5.3)
PROT SERPL-MCNC: 6.2 G/DL (ref 6.4–8.3)
RBC # BLD AUTO: 3.57 10E6/UL (ref 3.8–5.2)
SODIUM SERPL-SCNC: 139 MMOL/L (ref 136–145)
WBC # BLD AUTO: 13.6 10E3/UL (ref 4–11)

## 2023-06-02 PROCEDURE — 94375 RESPIRATORY FLOW VOLUME LOOP: CPT | Performed by: INTERNAL MEDICINE

## 2023-06-02 PROCEDURE — 83880 ASSAY OF NATRIURETIC PEPTIDE: CPT | Performed by: PATHOLOGY

## 2023-06-02 PROCEDURE — 82330 ASSAY OF CALCIUM: CPT | Performed by: PATHOLOGY

## 2023-06-02 PROCEDURE — 36415 COLL VENOUS BLD VENIPUNCTURE: CPT | Performed by: PATHOLOGY

## 2023-06-02 PROCEDURE — 86140 C-REACTIVE PROTEIN: CPT | Performed by: PATHOLOGY

## 2023-06-02 PROCEDURE — G0463 HOSPITAL OUTPT CLINIC VISIT: HCPCS | Performed by: INTERNAL MEDICINE

## 2023-06-02 PROCEDURE — 99214 OFFICE O/P EST MOD 30 MIN: CPT | Mod: 25 | Performed by: INTERNAL MEDICINE

## 2023-06-02 PROCEDURE — 80053 COMPREHEN METABOLIC PANEL: CPT | Performed by: PATHOLOGY

## 2023-06-02 PROCEDURE — 94729 DIFFUSING CAPACITY: CPT | Performed by: INTERNAL MEDICINE

## 2023-06-02 PROCEDURE — 85025 COMPLETE CBC W/AUTO DIFF WBC: CPT | Performed by: PATHOLOGY

## 2023-06-02 RX ORDER — MYCOPHENOLIC ACID 180 MG/1
540 TABLET, DELAYED RELEASE ORAL 2 TIMES DAILY
Qty: 180 TABLET | Refills: 0 | Status: SHIPPED | OUTPATIENT
Start: 2023-06-02 | End: 2023-06-05

## 2023-06-02 RX ORDER — MYCOPHENOLIC ACID 180 MG/1
540 TABLET, DELAYED RELEASE ORAL 2 TIMES DAILY
Qty: 180 TABLET | Refills: 0 | Status: SHIPPED | OUTPATIENT
Start: 2023-06-02 | End: 2023-06-02

## 2023-06-02 ASSESSMENT — PAIN SCALES - GENERAL: PAINLEVEL: NO PAIN (0)

## 2023-06-02 NOTE — PROGRESS NOTES
[facility] Lung Disease Clinic    Reason for Visit  Patty Soliman is a 72 year old year old female who is being seen for shortness of breath.   HPI from previous visit   Patient is 72 years old female with past history outlined below presents today to establish care with us in the ILD clinic.  She had progressive exertional dyspnea over the last several years but does not seem to be much worse in the last 6 months.  Back in August 2020, CTA was negative for pulmonary embolism, ILD described as probable UIP pattern was observed.  She has been seen by our pulmonary hypertension clinic for severe PH by echocardiography, RVSP 60+ with structural and functional impairment of the RV.  She underwent right heart cath and pulmonary hypertension was confirmed with negative vasoreactivity testing. Her cardiac index was also noticed to be reduced. She was recently started on ilioprost and sildenafil, with some symptomatic improvement. Cardiology was not convinced that the degree of ILD explains her pulmonary hypertension, which I do agree with based on the CT scan from August 2022.  However, the most recent CT scan from today shows significant progression in ILD.  Her pattern is inconsistent with UIP to me and there is significant mosaicism and signs of small airway disease suggestive of hypersensitivity pneumonitis.  She did not have a trial of steroids before.  Neither does she have an inhaler or nebulizer treatments attempted.  She does have some down products like a jacket and a comforter, but no other triggers for HP by history.  She denies connective tissue disease symptoms with the exception to dysphagia which seems to be early phase and has not been worked up before.  She does not have Raynaud's phenomena, arthralgias, muscle stiffness, oral ulcers, epistaxis, sinus symptoms, skin rash, skin ulcers, or hematuria.  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    Updates from 2/1/23  Patient returns today for follow up. She continues to have dyspnea at rest, and still using oxygen at 8-10 LPM for several months. She is accompanied by her daughter. We re-increased prednisone to 25 mg daily from 20 given worsening dyspnea.     Updates from today 6/2/23  Patient returns today for follow-up.  She continues to require continuous home oxygen, and does have desaturations to the low 80s at times.  She is able to ambulate to the bathroom by herself.  She has been very reluctant to see palliative care and has not made the appointment yet.  She is using portable liquid oxygen, and also follows over instructions for CellCept 1000 mg in the morning and 15 mg in the evening.  She continues to have some stomach irritation, and we reduced the CellCept from 3000 mg daily, we prescribed prednisone 10 mg daily which she continues to take.  She is also having some diarrhea that she thinks is related to the CellCept.  She does not have cough or sputum production.  She has no symptoms to suggest exacerbation or pneumonia.    Vitals: BP (!) 151/79   Pulse 92   Resp 17   Ht 1.575 m (5' 2\")   Wt 60.3 kg (133 lb)   SpO2 95%   BMI 24.33 kg/m      Exam:   GENERAL APPEARANCE: Well developed, well nourished, alert, and in no apparent distress.  RESP: good air flow throughout.  No crackles. No rhonchi. No wheezes.  CV: Normal S1, S2, regular rhythm, normal rate. No murmur.  No LE edema.   ABD: Soft, lax, nontender.  MS: extremities normal. No clubbing. No cyanosis.  SKIN: no rash on limited exam.  NEURO: Mentation intact, speech normal, normal gait and stance.  PSYCH: mentation appears normal. and affect normal/bright.    Results:  RHC 8/19/22  RA 9/16/9 mmHg  RV 65/11 mmHg  PA 64/20/37 mmHg  CWP 21/22/13 mmHg  CO (Terrance) 1.77 L/min  CI (Terrance) 1.11 L/min/m2  CO (TD) 2.45 L/min  CI (TD) 1.54 L/min/m2   mmHg   SVR 4,338 dynes (by measured Terrance)  PVR 13.56 GRAFF (by " measured Terrance)  PVR 9.8 (by TD)     No significant response to vasodilator therapy.     PFTs 6/2/23          Interpretation:   Isolated severe reduction in DLCO in keeping with pulmonary vascular disease initially and mild degree of restriction by TLC 2/2023.     Chest imaging:  Personally reviewed CT chest images from today 2/1 and compared to 11/2022  Agreed with radiology report:                                                             IMPRESSION: Minimal if any improvement in the subpleural fibrosis in  the right lower lobe with no other change in the interval otherwise.  Diffuse atherosclerosis. Severe mid thoracic dextrocurvature.  Unchanged left adrenal gland hyperplasia.         Assessment and plan:   1. ILD, favor hypersensitivity pneumonitis (HP) from down products  Patient is 73F with ILD and chronic hypoxic respiratory failure presents for follow up on advanced ILD. Her ILD seems to be progressive hypersensitivity pneumonitis pattern, based on mosaicism on CT and aitrapping on exhalation. Her exposure is down which she removed from her environment. She is a lifetime nonsmoker which is associated with slight increase in HP incidence. I believe her disease is transforming to chronic type as her TLC is now reflecting restriction. She also has significant pulmonary hypertension that is contributing largely to her oxygen needs. Complete autoimmune serologies have been negative as well as HP panels. We tried a prednisone taper in 2022 but unfortunately no subjective or objective improvement is seen, however, it is possible that it stabilized the disease. Prednisone currently at 10 mg with MMF 1000 qAM and 1500 at bedtime, down from 1500 BID, due to stomach irritation and reflux symptoms, which continue to occur but more tolerable. Esophogram was normal. MMF ramped up to 1500 BID.   Plan:   - Flovent, 220 mcg, increased to two puffs BID  - Spacer provided and instructions explained on how to use with flovent    - Continue MMF monitoring labs, all within normal today   - Change MMF to myfortic, 540 mg BID for two weeks then increase to 720 mg BID if tolerable  - Follow up with Palliative care referral, as anticipate prolonged hospitalization with flare and unlikely to extubate if she gets intubated. New order placed as patient never made the appointment after last visit.       2. Precapillary pulmonary hypertension  Receiving ilioprost and sildenafil per PH clinic with symptomatic improvement.     3. Dysphagia    4. Chronic hypoxic respiratory failure  Needing 8-10 LPM with activity  - Continue Portable home O2     5. Osteopenia  Noted on CT chest, Dexa scan confirms with t-score -1.3 of left femoral neck.       Return in 6 months

## 2023-06-02 NOTE — LETTER
6/2/2023         RE: Patty Soliman  [address]  [place] [facility] [address]        Dear Colleague,    Thank you for referring your patient, Patty Soliman, to the [facility] FOR LUNG SCIENCE AND [facility] [place]. Please see a copy of my visit note below.    [facility] Lung Disease [facility]    Reason for Visit  Patty Soliman is a 72 year old year old female who is being seen for shortness of breath.   HPI from previous visit   Patient is 72 years old female with past history outlined below presents today to establish care with us in the ILD clinic.  She had progressive exertional dyspnea over the last several years but does not seem to be much worse in the last 6 months.  Back in August 2020, CTA was negative for pulmonary embolism, ILD described as probable UIP pattern was observed.  She has been seen by our pulmonary hypertension clinic for severe PH by echocardiography, RVSP 60+ with structural and functional impairment of the RV.  She underwent right heart cath and pulmonary hypertension was confirmed with negative vasoreactivity testing. Her cardiac index was also noticed to be reduced. She was recently started on ilioprost and sildenafil, with some symptomatic improvement. Cardiology was not convinced that the degree of ILD explains her pulmonary hypertension, which I do agree with based on the CT scan from August 2022.  However, the most recent CT scan from today shows significant progression in ILD.  Her pattern is inconsistent with UIP to me and there is significant mosaicism and signs of small airway disease suggestive of hypersensitivity pneumonitis.  She did not have a trial of steroids before.  Neither does she have an inhaler or nebulizer treatments attempted.  She does have some down products like a jacket and a comforter, but no other triggers for HP by history.  She denies connective tissue disease symptoms with the exception to dysphagia which seems  "to be early phase and has not been worked up before.  She does not have Raynaud's phenomena, arthralgias, muscle stiffness, oral ulcers, epistaxis, sinus symptoms, skin rash, skin ulcers, or hematuria.  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    Updates from 2/1/23  Patient returns today for follow up. She continues to have dyspnea at rest, and still using oxygen at 8-10 LPM for several months. She is accompanied by her daughter. We re-increased prednisone to 25 mg daily from 20 given worsening dyspnea.     Updates from today 6/2/23  Patient returns today for follow-up.  She continues to require continuous home oxygen, and does have desaturations to the low 80s at times.  She is able to ambulate to the bathroom by herself.  She has been very reluctant to see palliative care and has not made the appointment yet.  She is using portable liquid oxygen, and also follows over instructions for CellCept 1000 mg in the morning and 15 mg in the evening.  She continues to have some stomach irritation, and we reduced the CellCept from 3000 mg daily, we prescribed prednisone 10 mg daily which she continues to take.  She is also having some diarrhea that she thinks is related to the CellCept.  She does not have cough or sputum production.  She has no symptoms to suggest exacerbation or pneumonia.    Vitals: BP (!) 151/79   Pulse 92   Resp 17   Ht 1.575 m (5' 2\")   Wt 60.3 kg (133 lb)   SpO2 95%   BMI 24.33 kg/m      Exam:   GENERAL APPEARANCE: Well developed, well nourished, alert, and in no apparent distress.  RESP: good air flow throughout.  No crackles. No rhonchi. No wheezes.  CV: Normal S1, S2, regular rhythm, normal rate. No murmur.  No LE edema.   ABD: Soft, lax, nontender.  MS: extremities normal. No clubbing. No cyanosis.  SKIN: no rash on limited exam.  NEURO: Mentation intact, speech normal, normal gait and stance.  PSYCH: mentation appears normal. and affect " normal/bright.    Results:  RHC 8/19/22  RA 9/16/9 mmHg  RV 65/11 mmHg  PA 64/20/37 mmHg  CWP 21/22/13 mmHg  CO (Terrance) 1.77 L/min  CI (Terrance) 1.11 L/min/m2  CO (TD) 2.45 L/min  CI (TD) 1.54 L/min/m2   mmHg   SVR 4,338 dynes (by measured Terrance)  PVR 13.56 GRAFF (by measured Terrance)  PVR 9.8 (by TD)     No significant response to vasodilator therapy.     PFTs 6/2/23          Interpretation:   Isolated severe reduction in DLCO in keeping with pulmonary vascular disease initially and mild degree of restriction by TLC 2/2023.     Chest imaging:  Personally reviewed CT chest images from today 2/1 and compared to 11/2022  Agreed with radiology report:                                                             IMPRESSION: Minimal if any improvement in the subpleural fibrosis in  the right lower lobe with no other change in the interval otherwise.  Diffuse atherosclerosis. Severe mid thoracic dextrocurvature.  Unchanged left adrenal gland hyperplasia.         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 Her ILD seems to be progressive hypersensitivity pneumonitis pattern, based on mosaicism on CT and aitrapping on exhalation. Her exposure is down which she removed from her environment. She is a lifetime nonsmoker which is associated with slight increase in HP incidence. I believe her disease is transforming to chronic type as her TLC is now reflecting restriction. She also has significant pulmonary hypertension that is contributing largely to her oxygen needs. Complete autoimmune serologies have been negative as well as HP panels. We tried a prednisone taper in 2022 but unfortunately no subjective or objective improvement is seen, however, it is possible that it stabilized the disease. Prednisone currently at 10 mg with MMF 1000 qAM and 1500 at bedtime, down from 1500 BID, due to  stomach irritation and reflux symptoms, which continue to occur but more tolerable. Esophogram was normal. MMF ramped up to 1500 BID.   Plan:   - Flovent, 220 mcg, increased to two puffs BID  - Spacer provided and instructions explained on how to use with flovent   - Continue MMF monitoring labs, all within normal today   - Change MMF to myfortic, 540 mg BID for two weeks then increase to 720 mg BID if tolerable  - Follow up with Palliative care referral, as anticipate prolonged hospitalization with flare and unlikely to extubate if she gets intubated. New order placed as patient never made the appointment after last visit.       Precapillary pulmonary hypertension  Receiving ilioprost and sildenafil per PH clinic with symptomatic improvement.     3. Dysphagia    4. Chronic hypoxic respiratory failure  Needing 8-10 LPM with activity  - Continue Portable home O2     5. Osteopenia  Noted on CT chest, Dexa scan confirms with t-score -1.3 of left femoral neck.       Return in 6 months             Again, thank you for allowing me to participate in the care of your patient.        Sincerely,        Shannan Rodriguez MD

## 2023-06-02 NOTE — NURSING NOTE
Discussed medication change with pt: pt will discontinue cellcept due to GI side effects and start mycophenolate sodium (myfortic) if it is covered by insurance. Informed pt and her  about using [employer]; everbill about $45/month. Pt agreed this would be feasible for her if insurance does not cover med.     Labs are stable today for pt to start mycophenolate sodium (myfortic) per Dr. Rodriguez. Explained dosing, side effects and routine lab monitoring will send detailed MyC message to pt with all the details for future reference. Encouraged pt to call the nurse line [phone] if she has any side effects, questions or concerns.     Standing lab orders placed for mycophenolate sodium (myfortic): CBCPD, CMP in one month, then Q3-6mo. Pt will have labs completed with Q6mo visit and at [facility] in [place] (fax: [phone]) in between clinic visits.     Message sent to Regino to fax lab orders to [facility], palliative referral, and scheduling future appointments (4mo follow up with Jennifer with [facility] & lab appt) since pt was unable to wait to schedule appts after clinic visit. Pt reminder set for 1 mo lab check and 2 week check to see if pt eduard Myfortic. Per Dr. Rodriguez, if pt is tolerating Myfortic 540mg BID after 2 weeks, then okay to increase dose to 720mg BID.      Staff message sent to [facility] in case PA needed.     Pt expressed understanding/agreed to plan.     Audra Richardson, RN, BSN  ILD Nurse   [phone]

## 2023-06-02 NOTE — PATIENT INSTRUCTIONS
Will try to change cellcept to mycophenolic acid and see if it is covered with your plan, hopefully this will cause less stomach side effects.     Palliative care referral has been re-ordered for you.       Please call our direct ILD nurses line for questions and concerns: [phone]    For scheduling, please call: [phone]

## 2023-06-05 DIAGNOSIS — J84.9 ILD (INTERSTITIAL LUNG DISEASE) (H): ICD-10-CM

## 2023-06-05 LAB
DLCOCOR-%PRED-PRE: 35 %
DLCOCOR-PRE: 6.24 ML/MIN/MMHG
DLCOUNC-%PRED-PRE: 32 %
DLCOUNC-PRE: 5.77 ML/MIN/MMHG
DLCOUNC-PRED: 17.7 ML/MIN/MMHG
ERV-%PRED-PRE: 69 %
ERV-PRE: 0.43 L
ERV-PRED: 0.62 L
EXPTIME-PRE: 5.71 SEC
FEF2575-%PRED-PRE: 131 %
FEF2575-PRE: 2.04 L/SEC
FEF2575-PRED: 1.54 L/SEC
FEFMAX-%PRED-PRE: 120 %
FEFMAX-PRE: 5.97 L/SEC
FEFMAX-PRED: 4.94 L/SEC
FEV1-%PRED-PRE: 88 %
FEV1-PRE: 1.6 L
FEV1FEV6-PRE: 88 %
FEV1FEV6-PRED: 78 %
FEV1FVC-PRE: 88 %
FEV1FVC-PRED: 79 %
FEV1SVC-PRE: 97 %
FEV1SVC-PRED: 69 %
FIFMAX-PRE: 4.43 L/SEC
FVC-%PRED-PRE: 79 %
FVC-PRE: 1.82 L
FVC-PRED: 2.3 L
IC-%PRED-PRE: 63 %
IC-PRE: 1.22 L
IC-PRED: 1.93 L
VA-%PRED-PRE: 60 %
VA-PRE: 2.55 L
VC-%PRED-PRE: 63 %
VC-PRE: 1.65 L
VC-PRED: 2.61 L

## 2023-06-05 RX ORDER — MYCOPHENOLIC ACID 180 MG/1
540 TABLET, DELAYED RELEASE ORAL 2 TIMES DAILY
Qty: 180 TABLET | Refills: 0 | Status: SHIPPED | OUTPATIENT
Start: 2023-06-05 | End: 2023-06-05

## 2023-06-05 RX ORDER — MYCOPHENOLIC ACID 180 MG/1
540 TABLET, DELAYED RELEASE ORAL 2 TIMES DAILY
Qty: 180 TABLET | Refills: 0 | Status: SHIPPED | OUTPATIENT
Start: 2023-06-05 | End: 2023-06-06

## 2023-06-06 DIAGNOSIS — J84.9 ILD (INTERSTITIAL LUNG DISEASE) (H): ICD-10-CM

## 2023-06-06 RX ORDER — MYCOPHENOLIC ACID 180 MG/1
540 TABLET, DELAYED RELEASE ORAL 2 TIMES DAILY
Qty: 180 TABLET | Refills: 0 | Status: SHIPPED | OUTPATIENT
Start: 2023-06-06 | End: 2023-06-06

## 2023-06-06 RX ORDER — MYCOPHENOLIC ACID 180 MG/1
TABLET, DELAYED RELEASE ORAL
Qty: 180 TABLET | Refills: 0 | Status: SHIPPED | OUTPATIENT
Start: 2023-06-06 | End: 2023-06-08

## 2023-06-06 RX ORDER — MYCOPHENOLIC ACID 180 MG/1
180 TABLET, DELAYED RELEASE ORAL 2 TIMES DAILY
Qty: 60 TABLET | Refills: 4 | Status: SHIPPED | OUTPATIENT
Start: 2023-06-06 | End: 2023-06-07

## 2023-06-06 NOTE — TELEPHONE ENCOUNTER
Routing refill to [facility] pharmacy for use of GoodRxcoupon with lowest cost.   Pt aware Medicare does not cover this medication for diagnosis applied.     Shi Rome, RN, BSN  ILD Nurse Care Coordinator  (P) [phone]

## 2023-06-06 NOTE — TELEPHONE ENCOUNTER
Spoke with patient; will plan to route medication to [facility] for use of GoodRx coupon (due to insurance not covering applicable diagnosis, ILD).     Unable to complete request in current encounter as pharmacy linked/locked to [facility]. Will refill in new encounter with route to [facility].     Shi Rome, RN, BSN  ILD Nurse Care Coordinator  (P) [phone]

## 2023-06-07 ENCOUNTER — TELEPHONE (OUTPATIENT)
Dept: PULMONOLOGY | Facility: CLINIC | Age: 73
End: 2023-06-07
Payer: MEDICARE

## 2023-06-07 DIAGNOSIS — J67.9 HYPERSENSITIVITY PNEUMONITIS (H): ICD-10-CM

## 2023-06-07 DIAGNOSIS — J84.9 ILD (INTERSTITIAL LUNG DISEASE) (H): ICD-10-CM

## 2023-06-07 DIAGNOSIS — J67.9 HYPERSENSITIVITY PNEUMONIA (H): Primary | ICD-10-CM

## 2023-06-07 RX ORDER — MYCOPHENOLIC ACID 180 MG/1
180 TABLET, DELAYED RELEASE ORAL 2 TIMES DAILY
Qty: 60 TABLET | Refills: 4 | Status: SHIPPED | OUTPATIENT
Start: 2023-06-07 | End: 2023-06-08

## 2023-06-07 NOTE — TELEPHONE ENCOUNTER
Pt called in requesting a call back to discuss medication. RN left a voicemail for pt to return call to clinic. Will wait for call back. RN updated dx code per Dr. Rodriguez to see if covered.   Vandana Edward RN BSN

## 2023-06-08 ENCOUNTER — MYC MEDICAL ADVICE (OUTPATIENT)
Dept: PULMONOLOGY | Facility: CLINIC | Age: 73
End: 2023-06-08
Payer: MEDICARE

## 2023-06-08 DIAGNOSIS — Z79.2 PROPHYLACTIC ANTIBIOTIC: ICD-10-CM

## 2023-06-08 RX ORDER — MYCOPHENOLIC ACID 180 MG/1
TABLET, DELAYED RELEASE ORAL
Qty: 180 TABLET | Refills: 2 | OUTPATIENT
Start: 2023-06-08

## 2023-06-08 RX ORDER — MYCOPHENOLIC ACID 180 MG/1
TABLET, DELAYED RELEASE ORAL
Qty: 180 TABLET | Refills: 0 | Status: SHIPPED | OUTPATIENT
Start: 2023-06-08 | End: 2023-07-06

## 2023-06-08 NOTE — TELEPHONE ENCOUNTER
Spoke with patient after speaking with [facility]- insurance it would be $134.98 per month. If using good Rx $102.67.  After research, [facility] should be around $58 a month. Pt stated if this is true,she would able to afford this. RN sent Rx with good Rx information to [facility].Pt will reach out if any concerns. Dr. Rodriguez updated.   Vandana Edward RN BSN

## 2023-06-09 RX ORDER — DAPSONE 100 MG/1
TABLET ORAL
Qty: 30 TABLET | Refills: 1 | Status: SHIPPED | OUTPATIENT
Start: 2023-06-09 | End: 2023-07-03

## 2023-06-09 NOTE — TELEPHONE ENCOUNTER
Pt contacted to inform she was quoted $44 from [facility]; hopes to initiate medication tomorrow (6/10/23) if  from pharmacy uneventful.     Shi Rome, RN, BSN  ILD Nurse Care Coordinator  (P) [phone]

## 2023-06-13 DIAGNOSIS — J45.40 MODERATE PERSISTENT REACTIVE AIRWAY DISEASE WITHOUT COMPLICATION: ICD-10-CM

## 2023-06-13 RX ORDER — FLUTICASONE PROPIONATE 220 UG/1
2 AEROSOL, METERED RESPIRATORY (INHALATION) 2 TIMES DAILY
Qty: 12 G | Refills: 11 | Status: SHIPPED | OUTPATIENT
Start: 2023-06-13 | End: 2024-03-22

## 2023-06-13 NOTE — TELEPHONE ENCOUNTER
Patient called to follow up about Flovent refill signature discrepancy, did not note 2 puffs BID. Will update Rx and route to pharmacy for future fills.     Per Office Visit on 6/2/23 with Dr. Rodriguez:  - Flovent, 220 mcg, increased to two puffs BID    Shi Rome, RN, BSN  ILD Nurse Care Coordinator  (P) [phone]

## 2023-06-21 DIAGNOSIS — J84.9 ILD (INTERSTITIAL LUNG DISEASE) (H): ICD-10-CM

## 2023-06-22 ENCOUNTER — TELEPHONE (OUTPATIENT)
Dept: PULMONOLOGY | Facility: CLINIC | Age: 73
End: 2023-06-22
Payer: MEDICARE

## 2023-06-22 RX ORDER — PREDNISONE 5 MG/1
TABLET ORAL
Qty: 450 TABLET | OUTPATIENT
Start: 2023-06-22

## 2023-06-22 NOTE — TELEPHONE ENCOUNTER
"Pt is taking Prednisone 10mg daily and is tolerating being on lower dose of Cellcept. Pt states \"feeling worlds better\". Encouraged pt to call if any questions or concerns.   Vandana Edward RN BSN   "

## 2023-06-23 ENCOUNTER — TELEPHONE (OUTPATIENT)
Dept: PULMONOLOGY | Facility: CLINIC | Age: 73
End: 2023-06-23
Payer: MEDICARE

## 2023-06-23 NOTE — TELEPHONE ENCOUNTER
Spoke to pt, she reports feeling much better on Cellcept, tolerating 1000mg QAM, and 500mg QPM. Pt has ILD clinic contact info and will reach out with any questions/concerns.     Audra Richardson RN, BSN  ILD Nurse   [phone]      ----- Message from Vandana Edward RN sent at 6/19/2023 11:59 AM CDT -----  Regarding: RE: 2 wk check-up to see if pt eduard Myfortic.  Check in with pt to see how tolerating switching back to Cellcept.   ----- Message -----  From: Vandana Edward RN  Sent: 6/16/2023  12:54 PM CDT  To: Audra Richardson RN; #  Subject: RE: 2 wk check-up to see if pt eduard Myfortic.     Left message for pt and also sent mychart msg. Will follow up next week if I don't hear back.   Candy       ----- Message -----  From: Audra Richarsdon RN  Sent: 6/16/2023  12:00 AM CDT  To: Interstitial Lung Disease Clinic Nurses-  Subject: 2 wk check-up to see if pt eduard Myfortic.         Jennifer saw pt in clinic 6/2/23.  Plan was for pt to discontinue Cellcept due to GI SE's and start mycophenolate sodium (myfortic).     Call pt to see how she is tolerating Myfortic. Any GI SE's?    Per Dr. Rodriguez, if pt is tolerating Myfortic 540mg BID after 2 weeks, then okay to increase dose to 720mg BID.

## 2023-07-02 DIAGNOSIS — Z79.2 PROPHYLACTIC ANTIBIOTIC: ICD-10-CM

## 2023-07-03 RX ORDER — DAPSONE 100 MG/1
TABLET ORAL
Qty: 30 TABLET | Refills: 1 | Status: SHIPPED | OUTPATIENT
Start: 2023-07-03 | End: 2023-08-01

## 2023-07-14 DIAGNOSIS — R06.09 DOE (DYSPNEA ON EXERTION): ICD-10-CM

## 2023-07-14 DIAGNOSIS — I27.20 PULMONARY HTN (H): Primary | ICD-10-CM

## 2023-07-17 ENCOUNTER — TELEPHONE (OUTPATIENT)
Dept: PULMONOLOGY | Facility: CLINIC | Age: 73
End: 2023-07-17

## 2023-07-17 NOTE — TELEPHONE ENCOUNTER
Pt left message asking for call back regarding medication questions. RN left message for pt to return call with direct nurse line.   Vandana Edward RN BSN

## 2023-07-17 NOTE — TELEPHONE ENCOUNTER
Pt returned call to clinic. She is taking Cellcept 1000 in am and 500 pm. She is very fatigued. However, she is very nervous to make any changes as she feels like she will have yet another set back and does not want that. She wanted to know what Dr. Rodriguez thinks. She is seeing Cards on 24th with a 6 minute walk. Her follow up with ILD is in October. Pt is trying to get out to walk with tanks. Message sent to MD to advise.   Vandana Edward, RN BSN

## 2023-07-18 ENCOUNTER — TELEPHONE (OUTPATIENT)
Dept: PULMONOLOGY | Facility: CLINIC | Age: 73
End: 2023-07-18
Payer: MEDICARE

## 2023-07-18 DIAGNOSIS — J84.9 ILD (INTERSTITIAL LUNG DISEASE) (H): Primary | ICD-10-CM

## 2023-07-18 RX ORDER — MYCOPHENOLATE MOFETIL 500 MG/1
1000 TABLET ORAL 2 TIMES DAILY
Qty: 120 TABLET | Refills: 3 | Status: SHIPPED | OUTPATIENT
Start: 2023-07-18 | End: 2023-11-13

## 2023-07-18 NOTE — TELEPHONE ENCOUNTER
Spoke to pt, relayed Dr. Rodriguez's message below. Pt expressed understanding/agreed to plan. Per pt, she is tolerating the fatigue. The fatigue is not her main concern. She wants to know if her current Cellcept dose (1000mg qAM, 500mg qPM) is the most effective dose to keep her lung disease stable. She is willing to take 1000mg BID if Dr. Rodriguez feels that is a more effective dose.     Audra Richardson, RN, BSN  ILD Nurse   [phone]'      ----- Message from Shannan Rodriguez MD sent at 7/17/2023  3:52 PM CDT -----  Regarding: RE: update/plan of care  Ideally 1000 BID would be an effective dose and with her current dose I am expecting some effect at least. For fatigue, make sure you are hydrating well since it is summer time and also use sport drinks whenever outside or activity that would trigger sweating. Hopefully rehab will improve fatigue.    AK  ----- Message -----  From: Vandana Edward RN  Sent: 7/17/2023  12:18 PM CDT  To: Shannan Rodriguez MD  Subject: update/plan of care                              Pt called clinic-  She is taking Cellcept 1000 in am and 500 pm. She is very fatigued. However, she is very nervous to make any changes as she feels like she will have yet another set back and does not want that. She wants to know if current dose is effective? She wanted to know what Dr. Rodriguez thinks. She is seeing Cardiology on July 24th with a 6 minute walk. Her follow up with ILD is in October. Pt is trying to get out to walk with tanks. Any insight appreciated.   Vandana Edward RN BSN

## 2023-07-18 NOTE — TELEPHONE ENCOUNTER
Spoke to pt and relayed Dr. Rodriguez's message below.     Shannan Rodriguez MD  P Interstitial Lung Disease Clinic Nurses-  Caller: Unspecified (Today,  9:01 AM)    Yes if she can tolerate it     AK     New rx for Cellcept 1000mg po BID sent to pt pharmacy of choice. Encouraged pt to reach out with intolerable side effects.  Pt expressed understanding/agreed to plan.     Audra Richardson, RN, BSN  ILD Nurse   [phone]

## 2023-07-23 ASSESSMENT — ENCOUNTER SYMPTOMS
DYSURIA: 0
MUSCLE WEAKNESS: 1
ARTHRALGIAS: 0
SLEEP DISTURBANCES DUE TO BREATHING: 0
PANIC: 1
DISTURBANCES IN COORDINATION: 0
COUGH: 1
STIFFNESS: 0
DOUBLE VISION: 0
VOMITING: 0
JAUNDICE: 0
NAIL CHANGES: 0
LIGHT-HEADEDNESS: 0
DIFFICULTY URINATING: 0
SKIN CHANGES: 0
RECTAL PAIN: 0
NECK PAIN: 1
SEIZURES: 0
LOSS OF CONSCIOUSNESS: 0
DEPRESSION: 0
HEMATURIA: 0
BLOOD IN STOOL: 0
POOR WOUND HEALING: 0
COUGH DISTURBING SLEEP: 0
HEARTBURN: 1
SPUTUM PRODUCTION: 0
CONSTIPATION: 0
BLOATING: 0
FLANK PAIN: 0
ORTHOPNEA: 0
EXERCISE INTOLERANCE: 1
DECREASED CONCENTRATION: 1
WEAKNESS: 1
SHORTNESS OF BREATH: 1
DYSPNEA ON EXERTION: 1
MUSCLE CRAMPS: 1
NERVOUS/ANXIOUS: 1
PARALYSIS: 0
HEADACHES: 0
MEMORY LOSS: 0
HEMOPTYSIS: 0
SPEECH CHANGE: 0
LEG PAIN: 0
EYE IRRITATION: 1
DIZZINESS: 0
POSTURAL DYSPNEA: 0
HYPERTENSION: 0
TREMORS: 1
PALPITATIONS: 1
DIARRHEA: 1
WHEEZING: 0
MYALGIAS: 1
ABDOMINAL PAIN: 0
HYPOTENSION: 0
EYE REDNESS: 1
SNORES LOUDLY: 0
SYNCOPE: 0
NUMBNESS: 1
TINGLING: 0
JOINT SWELLING: 0
NAUSEA: 0
EYE PAIN: 0
INSOMNIA: 0
BOWEL INCONTINENCE: 0
EYE WATERING: 1

## 2023-07-24 ENCOUNTER — OFFICE VISIT (OUTPATIENT)
Dept: CARDIOLOGY | Facility: CLINIC | Age: 73
End: 2023-07-24
Attending: INTERNAL MEDICINE
Payer: MEDICARE

## 2023-07-24 ENCOUNTER — LAB (OUTPATIENT)
Dept: LAB | Facility: CLINIC | Age: 73
End: 2023-07-24
Payer: MEDICARE

## 2023-07-24 VITALS
HEART RATE: 97 BPM | DIASTOLIC BLOOD PRESSURE: 77 MMHG | WEIGHT: 138.9 LBS | BODY MASS INDEX: 25.41 KG/M2 | OXYGEN SATURATION: 94 % | SYSTOLIC BLOOD PRESSURE: 136 MMHG

## 2023-07-24 DIAGNOSIS — R06.09 DOE (DYSPNEA ON EXERTION): ICD-10-CM

## 2023-07-24 DIAGNOSIS — J84.9 ILD (INTERSTITIAL LUNG DISEASE) (H): ICD-10-CM

## 2023-07-24 DIAGNOSIS — I27.20 PULMONARY HTN (H): ICD-10-CM

## 2023-07-24 DIAGNOSIS — I27.20 PULMONARY HTN (H): Primary | ICD-10-CM

## 2023-07-24 LAB
6 MIN WALK (FT): 540 FT
6 MIN WALK (M): 165 M
ALBUMIN SERPL BCG-MCNC: 4.1 G/DL (ref 3.5–5.2)
ALP SERPL-CCNC: 127 U/L (ref 35–104)
ALT SERPL W P-5'-P-CCNC: 36 U/L (ref 0–50)
ANION GAP SERPL CALCULATED.3IONS-SCNC: 9 MMOL/L (ref 7–15)
AST SERPL W P-5'-P-CCNC: 30 U/L (ref 0–45)
BASOPHILS # BLD AUTO: 0.1 10E3/UL (ref 0–0.2)
BASOPHILS NFR BLD AUTO: 0 %
BILIRUB SERPL-MCNC: 0.9 MG/DL
BUN SERPL-MCNC: 20 MG/DL (ref 8–23)
CALCIUM SERPL-MCNC: 10.1 MG/DL (ref 8.8–10.2)
CHLORIDE SERPL-SCNC: 102 MMOL/L (ref 98–107)
CREAT SERPL-MCNC: 0.83 MG/DL (ref 0.51–0.95)
DEPRECATED HCO3 PLAS-SCNC: 27 MMOL/L (ref 22–29)
EOSINOPHIL # BLD AUTO: 0.1 10E3/UL (ref 0–0.7)
EOSINOPHIL NFR BLD AUTO: 0 %
ERYTHROCYTE [DISTWIDTH] IN BLOOD BY AUTOMATED COUNT: 13.5 % (ref 10–15)
GFR SERPL CREATININE-BSD FRML MDRD: 73 ML/MIN/1.73M2
GLUCOSE SERPL-MCNC: 182 MG/DL (ref 70–99)
HCT VFR BLD AUTO: 38.1 % (ref 35–47)
HGB BLD-MCNC: 12 G/DL (ref 11.7–15.7)
IMM GRANULOCYTES # BLD: 0.2 10E3/UL
IMM GRANULOCYTES NFR BLD: 1 %
LYMPHOCYTES # BLD AUTO: 1.3 10E3/UL (ref 0.8–5.3)
LYMPHOCYTES NFR BLD AUTO: 8 %
MCH RBC QN AUTO: 31.1 PG (ref 26.5–33)
MCHC RBC AUTO-ENTMCNC: 31.5 G/DL (ref 31.5–36.5)
MCV RBC AUTO: 99 FL (ref 78–100)
MONOCYTES # BLD AUTO: 1 10E3/UL (ref 0–1.3)
MONOCYTES NFR BLD AUTO: 6 %
NEUTROPHILS # BLD AUTO: 13.4 10E3/UL (ref 1.6–8.3)
NEUTROPHILS NFR BLD AUTO: 85 %
NRBC # BLD AUTO: 0 10E3/UL
NRBC BLD AUTO-RTO: 0 /100
NT-PROBNP SERPL-MCNC: 2302 PG/ML (ref 0–900)
PLATELET # BLD AUTO: 208 10E3/UL (ref 150–450)
POTASSIUM SERPL-SCNC: 4.4 MMOL/L (ref 3.4–5.3)
PROT SERPL-MCNC: 6.4 G/DL (ref 6.4–8.3)
RBC # BLD AUTO: 3.86 10E6/UL (ref 3.8–5.2)
SODIUM SERPL-SCNC: 138 MMOL/L (ref 136–145)
WBC # BLD AUTO: 16 10E3/UL (ref 4–11)

## 2023-07-24 PROCEDURE — G0463 HOSPITAL OUTPT CLINIC VISIT: HCPCS | Performed by: INTERNAL MEDICINE

## 2023-07-24 PROCEDURE — 36415 COLL VENOUS BLD VENIPUNCTURE: CPT | Performed by: PATHOLOGY

## 2023-07-24 PROCEDURE — 80053 COMPREHEN METABOLIC PANEL: CPT | Performed by: PATHOLOGY

## 2023-07-24 PROCEDURE — 99215 OFFICE O/P EST HI 40 MIN: CPT | Performed by: INTERNAL MEDICINE

## 2023-07-24 PROCEDURE — 85025 COMPLETE CBC W/AUTO DIFF WBC: CPT | Performed by: PATHOLOGY

## 2023-07-24 PROCEDURE — 83880 ASSAY OF NATRIURETIC PEPTIDE: CPT | Performed by: PATHOLOGY

## 2023-07-24 PROCEDURE — 94618 PULMONARY STRESS TESTING: CPT | Performed by: INTERNAL MEDICINE

## 2023-07-24 ASSESSMENT — PAIN SCALES - GENERAL: PAINLEVEL: NO PAIN (0)

## 2023-07-24 NOTE — PROGRESS NOTES
07/24/2023    Dear Dr. Wise,    We had the pleasure of seeing Ms. Soliman in our pulmonary hypertension clinic at [facility].  As you know, she is a 73-year-old female with pulmonary hypertension secondary to interstitial lung disease, likely hypersensitivity pneumonitis.  She is currently on inhaled treprostinil DPI 64 mcg 4 times a day and sildenafil 20 mg 3 times a day.  She returns today for follow-up.    Overall, she has not noticed any significant deterioration in her exertional shortness of breath.  She requires 8 to 10 L at rest and sometimes up to 15 L during exertion to maintain her saturation.  She is able to do her activities of daily living slowly.  She lives independently.  I would currently characterize as functional class III.  She has not had any exertional chest pain or chest pressure.  No exertional presyncope or syncope.  She has not had any worsening lower extremity swelling.  She takes her furosemide only every other day.  No recent hospitalization or ER visit.  She is compliant with her inhaled treprostinil DPI 4 times a day and sildenafil 20 mg 3 times a day.  She is tolerating this without any side effects.    She is being followed by Dr. Penn with the ILD clinic.  She has hypersensitive pneumonitis for which she is on tapering dose of prednisone and mycophenolate.    PMH:  1.  Type 2 diabetes mellitus  2.  Hypertension  3.  Interstitial lung disease  4.  Pulmonary hypertension secondary to interstitial lung disease    Current medication   Current Outpatient Medications   Medication Sig    aspirin 325 MG tablet [ASPIRIN 325 MG TABLET] daily.    atorvastatin (LIPITOR) 10 MG tablet [ATORVASTATIN (LIPITOR) 10 MG TABLET] daily.    cholecalciferol, vitamin D3, 1,000 unit tablet [CHOLECALCIFEROL, VITAMIN D3, 1,000 UNIT TABLET] Take 2,000 Units by mouth daily.    coenzyme Q10 200 mg capsule [COENZYME Q10 200 MG CAPSULE] Take 200 mg by mouth daily.    dapsone (ACZONE) 100 MG  tablet TAKE 1 TABLET BY MOUTH EVERY DAY    dorzolamide-timolol (COSOPT) 22.3-6.8 mg/mL ophthalmic solution [DORZOLAMIDE-TIMOLOL (COSOPT) 22.3-6.8 MG/ML OPHTHALMIC SOLUTION] 1 gtt    fluticasone (FLOVENT HFA) 220 MCG/ACT inhaler Inhale 2 puffs into the lungs 2 times daily    furosemide (LASIX) 20 MG tablet Take 1 tablet (20 mg) by mouth daily    latanoprost (XALATAN) 0.005 % ophthalmic solution [LATANOPROST (XALATAN) 0.005 % OPHTHALMIC SOLUTION] Administer 1 drop to both eyes daily.     metFORMIN (GLUCOPHAGE) 500 MG tablet [METFORMIN (GLUCOPHAGE) 500 MG TABLET] Take 1,000 mg by mouth daily with breakfast.     metoprolol tartrate (LOPRESSOR) 25 MG tablet [METOPROLOL TARTRATE (LOPRESSOR) 25 MG TABLET] Take 25 mg by mouth daily.     mycophenolate (GENERIC EQUIVALENT) 500 MG tablet Take 2 tablets (1,000 mg) by mouth 2 times daily    omeprazole (PRILOSEC) 20 MG capsule [OMEPRAZOLE (PRILOSEC) 20 MG CAPSULE] Take 20 mg by mouth daily before breakfast.     potassium chloride ER (KLOR-CON M) 20 MEQ CR tablet Take 2 tablets (40 mEq) by mouth 2 times daily    predniSONE (DELTASONE) 5 MG tablet Take 5 tablets (25 mg) by mouth daily for 14 days, THEN 4 tablets (20 mg) daily for 14 days, THEN 3 tablets (15 mg) daily for 14 days, THEN 2 tablets (10 mg) daily for 30 days, THEN 1 tablet (5 mg) daily for 30 days, THEN 0.5 tablets (2.5 mg) daily for 5 days.    sildenafil (REVATIO) 20 MG tablet Take 1 tablet (20 mg) by mouth 3 times daily    Treprostinil 64 MCG POWD Inhale 64 mcg into the lungs 4 times daily     No current facility-administered medications for this visit.     ROS:   Constitutional: No fever, chills, or sweats. No weight gain/loss  ENT: No visual disturbance, ear ache, epistaxis, sore throat  Allergies/Immunologic: Negative   Respiratory: She does have a dry cough on and off.  Cardiovascular: As per HPI  GI: No nausea, vomiting, hematemesis, melena, or hematochezia   : No urinary frequency, dysuria, or  hematuria  Integument: Negative  Psychiatric: Negative   Neuro: Negative  Endocrinology: Negative   Musculoskeletal: Negative    EXAM:   /77 (BP Location: Right arm, Patient Position: Chair, Cuff Size: Adult Regular)   Pulse 97   Wt 63 kg (138 lb 14.4 oz)   SpO2 94%   BMI 25.41 kg/m    She was awake, alert, oriented x3.  She was comfortable.  She was in no apparent distress.  She had no pallor, cyanosis or jaundice.  Neck exam revealed no jugular venous distention.  Her carotids were 1+ bilaterally.  Pulse was regular and rhythm.  Cardiac auscultation revealed normal S1 loud P2 no murmur rub or gallop.  Auscultation of the lungs revealed normal vesicular sounds bilaterally and bilateral end inspiratory crepitations.  Her abdomen was soft with normal also no tenderness no rigidity no guarding.  She had no focal neurological deficit.  Her extremities showed no edema.    Labs:    Recent Results (from the past 168 hour(s))   Comprehensive metabolic panel    Collection Time: 07/24/23 12:01 PM   Result Value Ref Range    Sodium 138 136 - 145 mmol/L    Potassium 4.4 3.4 - 5.3 mmol/L    Chloride 102 98 - 107 mmol/L    Carbon Dioxide (CO2) 27 22 - 29 mmol/L    Anion Gap 9 7 - 15 mmol/L    Urea Nitrogen 20.0 8.0 - 23.0 mg/dL    Creatinine 0.83 0.51 - 0.95 mg/dL    Calcium 10.1 8.8 - 10.2 mg/dL    Glucose 182 (H) 70 - 99 mg/dL    Alkaline Phosphatase 127 (H) 35 - 104 U/L    AST 30 0 - 45 U/L    ALT 36 0 - 50 U/L    Protein Total 6.4 6.4 - 8.3 g/dL    Albumin 4.1 3.5 - 5.2 g/dL    Bilirubin Total 0.9 <=1.2 mg/dL    GFR Estimate 73 >60 mL/min/1.73m2   N terminal pro BNP outpatient    Collection Time: 07/24/23 12:01 PM   Result Value Ref Range    N Terminal Pro BNP Outpatient 2,302 (H) 0 - 900 pg/mL   CBC with platelets and differential    Collection Time: 07/24/23 12:01 PM   Result Value Ref Range    WBC Count 16.0 (H) 4.0 - 11.0 10e3/uL    RBC Count 3.86 3.80 - 5.20 10e6/uL    Hemoglobin 12.0 11.7 - 15.7 g/dL     Hematocrit 38.1 35.0 - 47.0 %    MCV 99 78 - 100 fL    MCH 31.1 26.5 - 33.0 pg    MCHC 31.5 31.5 - 36.5 g/dL    RDW 13.5 10.0 - 15.0 %    Platelet Count 208 150 - 450 10e3/uL    % Neutrophils 85 %    % Lymphocytes 8 %    % Monocytes 6 %    % Eosinophils 0 %    % Basophils 0 %    % Immature Granulocytes 1 %    NRBCs per 100 WBC 0 <1 /100    Absolute Neutrophils 13.4 (H) 1.6 - 8.3 10e3/uL    Absolute Lymphocytes 1.3 0.8 - 5.3 10e3/uL    Absolute Monocytes 1.0 0.0 - 1.3 10e3/uL    Absolute Eosinophils 0.1 0.0 - 0.7 10e3/uL    Absolute Basophils 0.1 0.0 - 0.2 10e3/uL    Absolute Immature Granulocytes 0.2 <=0.4 10e3/uL    Absolute NRBCs 0.0 10e3/uL        Echocardiogram (03/2023)  Global and regional left ventricular function is normal with an EF of 55-60%.  RVEDv 104.8%.RVESv 73.1ml. RV EF 30.2%.  RVFAC 25.2%. RVLS s -8.9%. RVLSfw -16.2%.  Right ventricular systolic pressure is 43mmHg above the right atrial pressure.  IVC diameter <2.1 cm collapsing >50% with sniff suggests a normal RA pressure  of 3 mmHg.  RV size,function ,PA pressure and TR all improved.    RHC (03/2023)  Right Heart Catheterization:  /80/112 mmHg   BPM    RA 8/10/7 mmHg  RV 90/7 mmHg  PA 90/29 (49) mmHg  PCW 11/11/9 mmHg  Terrance CO 3.71 L/min Normal = 4.0-8.0 L/min  Terrance CI 2.32 L/min/m2 Normal = 2.5-4.0 L/min/m2  TD CO 3.57 L/min Normal = 4.0-8.0 L/min  TD CI 2.23 L/min/m2 Normal = 2.5-4.0 L/min/m2  PA sat 58%   Hgb 11 g/dL   PVR 10.8 Woods units  SVR 1056.1 dynes-sec/cm5          Latest Ref Rng & Units 6/2/2023     8:44 AM   PFT   FVC L 1.82    FEV1 L 1.60    FVC% % 79    FEV1% % 88        6MWT (11/2022)  On her 6-minute walk test she walked 152 m only.  She required 15 L of supplemental oxygen to maintain a saturation of 85%.    Assessment and Plan:   In summary, Ms. Soliman is a very delightful 73-year-old female with pulmonary hypertension due to interstitial lung disease who returns today for follow-up.  She is currently on inhaled  treprostinil DPI 64 mcg 4 times a day and sildenafil 20 mg 3 times a day.    1. Pulmonary arterial hypertension out of proportion to interstitial lung disease:     She has been reasonably stable in the last 3 months.  She continues to remain functional class III weight with a very high oxygen requirement but has not had any further progression.  Her NT proBNP is elevated but significantly better when compared to last year.  Her repeat heart catheterization showed improvement in her mean PA pressure, PVR and cardiac index.  Her repeat echocardiogram also showed improvement in her right ventricular size and function.    She is on maximum tolerated therapy for pulm hypertension secondary to interstitial lung disease.  I have recommended her to continue inhaled treprostinil 64 mcg DPI 4 times a day, sildenafil 20 mg 3 times a day, and furosemide 20 mg every other day, and supplemental oxygen 8 to 10 L at rest and 10 to 15 L with exertion.    2.  Interstitial lung disease - This is managed by Dr. Rodriguez.  She is on tapering dose of prednisone and mycophenolate.    3. SVT - on low dose metoprolol.    I have recommended her to return to see my colleague Janet Duke in 3 months via video visit.  We will repeat labs at that time.  She will return to see me in person in 6 months with labs, echocardiogram, and 6-minute walk test.    It was a pleasure meeting Ms. Soliman in our pulmonary hypertension clinic continues to [facility].  We thank you for involving us in her care.    Total time today was 42 minutes reviewing notes, imaging, labs, patient visit, orders and documentation       Sincerely,  Daniel Sanchez MD   [facility]  Heart Failure, Transplant, and Mechanical Circulatory Support Cardiology   Cardiovascular Division  [facility]   [phone]

## 2023-07-24 NOTE — NURSING NOTE
"Plan as patient understands:  Follow up with Janet Duke PA-C virtually in 3 months.  Labs locally prior    ========================  Reviewed Med list  Completed AVS  Follow-up orders placed  Marked chart \"Ready for Checkout\"  Gave patient copy of AVS  Sent msg to [facility] for follow up   Patient verbalized understanding, agreed with plan and denied any further questions. Saray Angel RN on 7/24/2023 at 1:49 PM    "

## 2023-07-24 NOTE — NURSING NOTE
Chief Complaint   Patient presents with    Follow Up     Return Pulmonary     Vitals were taken and medications reconciled.    Michael Dutton, EMT  1:08 PM

## 2023-07-24 NOTE — PATIENT INSTRUCTIONS
Medication Changes:   -None    Patient Instructions:  1. Continue staying active and eat a heart healthy diet.    2. Please keep current list of medications with you at all times.    3. Remember to weigh yourself daily after voiding and before you consume any food or beverages and log the numbers.  If you have gained 2 pounds overnight or 5 pounds in a week contact us immediately for medication adjustments or further instructions.    4. **Please call us immediately if you have any syncope (fainting or passing out), chest pain, edema (swelling or weight gain), or decline in your functional status (general decline in how you are feeling).    5. Patients on Remodulin (treprostinil) or Veletri (epoprostenol): Please make sure that you have your backup pump and supplies with you at all times, your mixing instructions, and contact information for your specialty pharmacy.    Follow up Appointment Information:  -3 month follow up with Janet Duke PA-C virtually with labs locally prior  -Pulmonary Rehab (use 10-15L during exercise)   If you have not heard from the scheduling office within 2 business days, please call [phone]  .    Results:  Component      Latest Ref Rn 7/24/2023  12:01 PM   WBC      4.0 - 11.0 10e3/uL 16.0 (H)    RBC Count      3.80 - 5.20 10e6/uL 3.86    Hemoglobin      11.7 - 15.7 g/dL 12.0    Hematocrit      35.0 - 47.0 % 38.1    MCV      78 - 100 fL 99    MCH      26.5 - 33.0 pg 31.1    MCHC      31.5 - 36.5 g/dL 31.5    RDW      10.0 - 15.0 % 13.5    Platelet Count      150 - 450 10e3/uL 208    % Neutrophils      % 85    % Lymphocytes      % 8    % Monocytes      % 6    % Eosinophils      % 0    % Basophils      % 0    % Immature Granulocytes      % 1    NRBCs per 100 WBC      <1 /100 0    Absolute Neutrophils      1.6 - 8.3 10e3/uL 13.4 (H)    Absolute Lymphocytes      0.8 - 5.3 10e3/uL 1.3    Absolute Monocytes      0.0 - 1.3 10e3/uL 1.0    Absolute Eosinophils      0.0 - 0.7 10e3/uL 0.1     Absolute Basophils      0.0 - 0.2 10e3/uL 0.1    Absolute Immature Granulocytes      <=0.4 10e3/uL 0.2    Absolute NRBCs      10e3/uL 0.0    Sodium      136 - 145 mmol/L 138    Potassium      3.4 - 5.3 mmol/L 4.4    Chloride      98 - 107 mmol/L 102    Carbon Dioxide (CO2)      22 - 29 mmol/L 27    Anion Gap      7 - 15 mmol/L 9    Urea Nitrogen      8.0 - 23.0 mg/dL 20.0    Creatinine      0.51 - 0.95 mg/dL 0.83    Calcium      8.8 - 10.2 mg/dL 10.1    Glucose      70 - 99 mg/dL 182 (H)    Alkaline Phosphatase      35 - 104 U/L 127 (H)    AST      0 - 45 U/L 30    ALT      0 - 50 U/L 36    Protein Total      6.4 - 8.3 g/dL 6.4    Albumin      3.5 - 5.2 g/dL 4.1    Bilirubin Total      <=1.2 mg/dL 0.9    GFR Estimate      >60 mL/min/1.73m2 73    N-Terminal Pro Bnp      0 - 900 pg/mL 2,302 (H)       Legend:  (H) High  We are located on the third floor of [facility] on the [facility].  Our address is     [address] [place] [address] [place] [address] [place] [address]      Thank you for allowing us to be a part of your care here at the [facility]    If you have questions or concerns please contact us at:    Kelli Hager RN, BSN      Nurse Coordinator         Pulmonary Hypertension       [facility]     (Phone)[phone]         KENNETH Hernandez   (Prior Authorizations)   ()  Clinic   Clinic   Pulmonary Hypertension   Pulmonary Hypertension  [facility]  (P)[phone]    (P) [phone]  (F) [phone]          ** Please note that you will NOT receive a reminder call regarding your scheduled testing, reminder calls are for provider appointments only.  If you are scheduled for testing within the [facility] system you may receive a call regarding pre-registration for  billing purposes only.**     Support Group:  Pulmonary Hypertension Association  Https://www.phassociation.org/  **Look at the Events Tab** They even have Support Groups that you can call into    [facility] PH Support Group  Second Saturday of the Month from 1-3 PM   Location: [address] [place] [id number]  Leader: Reina French  Phone: [phone]  Email: [email or web address]     Great Videos about Pulmonary Hypertension!!  Scan ME!    Website: [email or web address]

## 2023-07-24 NOTE — LETTER
7/24/2023      RE: Patty Soliman  [address]  [facility] [address]       Dear Colleague,    Thank you for the opportunity to participate in the care of your patient, Patty Soliman, at the [facility] [place] [facility]. Please see a copy of my visit note below.    07/24/2023    Dear Dr. Wise,    We had the pleasure of seeing Ms. Soliman in our pulmonary hypertension clinic at [facility].  As you know, she is a 73-year-old female with pulmonary hypertension secondary to interstitial lung disease, likely hypersensitivity pneumonitis.  She is currently on inhaled treprostinil DPI 64 mcg 4 times a day and sildenafil 20 mg 3 times a day.  She returns today for follow-up.    Overall, she has not noticed any significant deterioration in her exertional shortness of breath.  She requires 8 to 10 L at rest and sometimes up to 15 L during exertion to maintain her saturation.  She is able to do her activities of daily living slowly.  She lives independently.  I would currently characterize as functional class III.  She has not had any exertional chest pain or chest pressure.  No exertional presyncope or syncope.  She has not had any worsening lower extremity swelling.  She takes her furosemide only every other day.  No recent hospitalization or ER visit.  She is compliant with her inhaled treprostinil DPI 4 times a day and sildenafil 20 mg 3 times a day.  She is tolerating this without any side effects.    She is being followed by Dr. Penn with the ILD clinic.  She has hypersensitive pneumonitis for which she is on tapering dose of prednisone and mycophenolate.    PMH:  1.  Type 2 diabetes mellitus  2.  Hypertension  3.  Interstitial lung disease  4.  Pulmonary hypertension secondary to interstitial lung disease    Current medication   Current Outpatient Medications   Medication Sig     aspirin 325 MG tablet [ASPIRIN 325 MG TABLET]  daily.     atorvastatin (LIPITOR) 10 MG tablet [ATORVASTATIN (LIPITOR) 10 MG TABLET] daily.     cholecalciferol, vitamin D3, 1,000 unit tablet [CHOLECALCIFEROL, VITAMIN D3, 1,000 UNIT TABLET] Take 2,000 Units by mouth daily.     coenzyme Q10 200 mg capsule [COENZYME Q10 200 MG CAPSULE] Take 200 mg by mouth daily.     dapsone (ACZONE) 100 MG tablet TAKE 1 TABLET BY MOUTH EVERY DAY     dorzolamide-timolol (COSOPT) 22.3-6.8 mg/mL ophthalmic solution [DORZOLAMIDE-TIMOLOL (COSOPT) 22.3-6.8 MG/ML OPHTHALMIC SOLUTION] 1 gtt     fluticasone (FLOVENT HFA) 220 MCG/ACT inhaler Inhale 2 puffs into the lungs 2 times daily     furosemide (LASIX) 20 MG tablet Take 1 tablet (20 mg) by mouth daily     latanoprost (XALATAN) 0.005 % ophthalmic solution [LATANOPROST (XALATAN) 0.005 % OPHTHALMIC SOLUTION] Administer 1 drop to both eyes daily.      metFORMIN (GLUCOPHAGE) 500 MG tablet [METFORMIN (GLUCOPHAGE) 500 MG TABLET] Take 1,000 mg by mouth daily with breakfast.      metoprolol tartrate (LOPRESSOR) 25 MG tablet [METOPROLOL TARTRATE (LOPRESSOR) 25 MG TABLET] Take 25 mg by mouth daily.      mycophenolate (GENERIC EQUIVALENT) 500 MG tablet Take 2 tablets (1,000 mg) by mouth 2 times daily     omeprazole (PRILOSEC) 20 MG capsule [OMEPRAZOLE (PRILOSEC) 20 MG CAPSULE] Take 20 mg by mouth daily before breakfast.      potassium chloride ER (KLOR-CON M) 20 MEQ CR tablet Take 2 tablets (40 mEq) by mouth 2 times daily     predniSONE (DELTASONE) 5 MG tablet Take 5 tablets (25 mg) by mouth daily for 14 days, THEN 4 tablets (20 mg) daily for 14 days, THEN 3 tablets (15 mg) daily for 14 days, THEN 2 tablets (10 mg) daily for 30 days, THEN 1 tablet (5 mg) daily for 30 days, THEN 0.5 tablets (2.5 mg) daily for 5 days.     sildenafil (REVATIO) 20 MG tablet Take 1 tablet (20 mg) by mouth 3 times daily     Treprostinil 64 MCG POWD Inhale 64 mcg into the lungs 4 times daily     No current facility-administered medications for this visit.     ROS:    Constitutional: No fever, chills, or sweats. No weight gain/loss  ENT: No visual disturbance, ear ache, epistaxis, sore throat  Allergies/Immunologic: Negative   Respiratory: She does have a dry cough on and off.  Cardiovascular: As per HPI  GI: No nausea, vomiting, hematemesis, melena, or hematochezia   : No urinary frequency, dysuria, or hematuria  Integument: Negative  Psychiatric: Negative   Neuro: Negative  Endocrinology: Negative   Musculoskeletal: Negative    EXAM:   /77 (BP Location: Right arm, Patient Position: Chair, Cuff Size: Adult Regular)   Pulse 97   Wt 63 kg (138 lb 14.4 oz)   SpO2 94%   BMI 25.41 kg/m    She was awake, alert, oriented x3.  She was comfortable.  She was in no apparent distress.  She had no pallor, cyanosis or jaundice.  Neck exam revealed no jugular venous distention.  Her carotids were 1+ bilaterally.  Pulse was regular and rhythm.  Cardiac auscultation revealed normal S1 loud P2 no murmur rub or gallop.  Auscultation of the lungs revealed normal vesicular sounds bilaterally and bilateral end inspiratory crepitations.  Her abdomen was soft with normal also no tenderness no rigidity no guarding.  She had no focal neurological deficit.  Her extremities showed no edema.    Labs:    Recent Results (from the past 168 hour(s))   Comprehensive metabolic panel    Collection Time: 07/24/23 12:01 PM   Result Value Ref Range    Sodium 138 136 - 145 mmol/L    Potassium 4.4 3.4 - 5.3 mmol/L    Chloride 102 98 - 107 mmol/L    Carbon Dioxide (CO2) 27 22 - 29 mmol/L    Anion Gap 9 7 - 15 mmol/L    Urea Nitrogen 20.0 8.0 - 23.0 mg/dL    Creatinine 0.83 0.51 - 0.95 mg/dL    Calcium 10.1 8.8 - 10.2 mg/dL    Glucose 182 (H) 70 - 99 mg/dL    Alkaline Phosphatase 127 (H) 35 - 104 U/L    AST 30 0 - 45 U/L    ALT 36 0 - 50 U/L    Protein Total 6.4 6.4 - 8.3 g/dL    Albumin 4.1 3.5 - 5.2 g/dL    Bilirubin Total 0.9 <=1.2 mg/dL    GFR Estimate 73 >60 mL/min/1.73m2   N terminal pro BNP  outpatient    Collection Time: 07/24/23 12:01 PM   Result Value Ref Range    N Terminal Pro BNP Outpatient 2,302 (H) 0 - 900 pg/mL   CBC with platelets and differential    Collection Time: 07/24/23 12:01 PM   Result Value Ref Range    WBC Count 16.0 (H) 4.0 - 11.0 10e3/uL    RBC Count 3.86 3.80 - 5.20 10e6/uL    Hemoglobin 12.0 11.7 - 15.7 g/dL    Hematocrit 38.1 35.0 - 47.0 %    MCV 99 78 - 100 fL    MCH 31.1 26.5 - 33.0 pg    MCHC 31.5 31.5 - 36.5 g/dL    RDW 13.5 10.0 - 15.0 %    Platelet Count 208 150 - 450 10e3/uL    % Neutrophils 85 %    % Lymphocytes 8 %    % Monocytes 6 %    % Eosinophils 0 %    % Basophils 0 %    % Immature Granulocytes 1 %    NRBCs per 100 WBC 0 <1 /100    Absolute Neutrophils 13.4 (H) 1.6 - 8.3 10e3/uL    Absolute Lymphocytes 1.3 0.8 - 5.3 10e3/uL    Absolute Monocytes 1.0 0.0 - 1.3 10e3/uL    Absolute Eosinophils 0.1 0.0 - 0.7 10e3/uL    Absolute Basophils 0.1 0.0 - 0.2 10e3/uL    Absolute Immature Granulocytes 0.2 <=0.4 10e3/uL    Absolute NRBCs 0.0 10e3/uL        Echocardiogram (03/2023)  Global and regional left ventricular function is normal with an EF of 55-60%.  RVEDv 104.8%.RVESv 73.1ml. RV EF 30.2%.  RVFAC 25.2%. RVLS s -8.9%. RVLSfw -16.2%.  Right ventricular systolic pressure is 43mmHg above the right atrial pressure.  IVC diameter <2.1 cm collapsing >50% with sniff suggests a normal RA pressure  of 3 mmHg.  RV size,function ,PA pressure and TR all improved.    [facility] (03/2023)  Right Heart Catheterization:  /80/112 mmHg   BPM    RA 8/10/7 mmHg  RV 90/7 mmHg  PA 90/29 (49) mmHg  PCW 11/11/9 mmHg  Terrance CO 3.71 L/min Normal = 4.0-8.0 L/min  Terrance CI 2.32 L/min/m2 Normal = 2.5-4.0 L/min/m2  TD CO 3.57 L/min Normal = 4.0-8.0 L/min  TD CI 2.23 L/min/m2 Normal = 2.5-4.0 L/min/m2  PA sat 58%   Hgb 11 g/dL   PVR 10.8 Woods units  SVR 1056.1 dynes-sec/cm5          Latest Ref Rng & Units 6/2/2023     8:44 AM   PFT   FVC L 1.82    FEV1 L 1.60    FVC% % 79    FEV1% % 88        6MWT  (11/2022)  On her 6-minute walk test she walked 152 m only.  She required 15 L of supplemental oxygen to maintain a saturation of 85%.    Assessment and Plan:   In summary, Ms. Soliman is a very delightful 73-year-old female with pulmonary hypertension due to interstitial lung disease who returns today for follow-up.  She is currently on inhaled treprostinil DPI 64 mcg 4 times a day and sildenafil 20 mg 3 times a day.    1. Pulmonary arterial hypertension out of proportion to interstitial lung disease:     She has been reasonably stable in the last 3 months.  She continues to remain functional class III weight with a very high oxygen requirement but has not had any further progression.  Her NT proBNP is elevated but significantly better when compared to last year.  Her repeat heart catheterization showed improvement in her mean PA pressure, PVR and cardiac index.  Her repeat echocardiogram also showed improvement in her right ventricular size and function.    She is on maximum tolerated therapy for pulm hypertension secondary to interstitial lung disease.  I have recommended her to continue inhaled treprostinil 64 mcg DPI 4 times a day, sildenafil 20 mg 3 times a day, and furosemide 20 mg every other day, and supplemental oxygen 8 to 10 L at rest and 10 to 15 L with exertion.    2.  Interstitial lung disease - This is managed by Dr. Rodriguez.  She is on tapering dose of prednisone and mycophenolate.    3. SVT - on low dose metoprolol.    I have recommended her to return to see my colleague aJnet Duke in 3 months via video visit.  We will repeat labs at that time.  She will return to see me in person in 6 months with labs, echocardiogram, and 6-minute walk test.    It was a pleasure meeting Ms. Soliman in our pulmonary hypertension clinic continues to [facility].  We thank you for involving us in her care.    Total time today was 42 minutes reviewing notes, imaging, labs, patient visit, orders and  documentation       Sincerely,  Daniel Sanchez MD   [facility]  Heart Failure, Transplant, and Mechanical Circulatory Support Cardiology   Cardiovascular Division  [facility]   [phone]

## 2023-07-25 LAB — FIO2-PRE: 45 %

## 2023-07-29 DIAGNOSIS — Z79.2 PROPHYLACTIC ANTIBIOTIC: ICD-10-CM

## 2023-08-01 RX ORDER — DAPSONE 100 MG/1
TABLET ORAL
Qty: 90 TABLET | Refills: 1 | Status: SHIPPED | OUTPATIENT
Start: 2023-08-01 | End: 2024-01-25

## 2023-08-29 ENCOUNTER — DOCUMENTATION ONLY (OUTPATIENT)
Dept: OTHER | Facility: CLINIC | Age: 73
End: 2023-08-29
Payer: MEDICARE

## 2023-08-29 ENCOUNTER — OFFICE VISIT (OUTPATIENT)
Dept: RADIATION ONCOLOGY | Facility: HOSPITAL | Age: 73
End: 2023-08-29
Attending: FAMILY MEDICINE
Payer: MEDICARE

## 2023-08-29 VITALS
TEMPERATURE: 98.6 F | SYSTOLIC BLOOD PRESSURE: 116 MMHG | OXYGEN SATURATION: 96 % | DIASTOLIC BLOOD PRESSURE: 54 MMHG | HEART RATE: 90 BPM | RESPIRATION RATE: 18 BRPM

## 2023-08-29 DIAGNOSIS — Z71.89 GOALS OF CARE, COUNSELING/DISCUSSION: ICD-10-CM

## 2023-08-29 DIAGNOSIS — J84.9 ILD (INTERSTITIAL LUNG DISEASE) (H): ICD-10-CM

## 2023-08-29 DIAGNOSIS — Z51.5 PALLIATIVE CARE PATIENT: Primary | ICD-10-CM

## 2023-08-29 PROCEDURE — 99205 OFFICE O/P NEW HI 60 MIN: CPT | Performed by: FAMILY MEDICINE

## 2023-08-29 PROCEDURE — 99497 ADVNCD CARE PLAN 30 MIN: CPT | Performed by: FAMILY MEDICINE

## 2023-08-29 PROCEDURE — G0463 HOSPITAL OUTPT CLINIC VISIT: HCPCS | Performed by: FAMILY MEDICINE

## 2023-08-29 NOTE — PATIENT INSTRUCTIONS
"It was good to see you today, Patty and Summer.    Here are the things we talked about:    Please think about a trial of morphine to help with your shortness of breath.    We talked about starting to look at completing a healthcare directive.  I recommend you check out- [email or web address]     They have several tools and some advice about getting started.  Under the \"How do I document my choices\" section, I am a fan of the full length healthcare directive (for adults with serious illness) because I think it has a good combination of the medical questions that are important as well as the social and personal questions that allow healthcare providers to get to know you as a person if you are unable to speak for yourself.  You do not have to complete every question on the document.  I generally recommend patients start with 3 or 4 questions on the goals page and work from there.     There is also a Short Form, which primarily serves to designate health care agents. These are people who can speak on your behalf about your healthcare wishes/goals if you are not able to speak for yourself.    Sleep Hygine plan:   1. Regular bedtime and rise time  2. Avoid napping --especially naps lasting longer than 1 hour and naps late in the day.   3. Limit caffeine --avoid caffeine after lunch.   4. Exercise   5. Keep the sleep environment quiet and dark.  Noiseand light exposure during the night can disrupt sleep. White noise or ear plugs are often recommended to reduce noise. Using blackout shades or an eye mask is commonly recommended to reduce light.   6. NO television ortechnology near bedtime      Let's get back together in the new year or sooner, if needed.       How to get a hold of us:  For non-urgent matters, MyChart works best.    For more urgent matters, or if you prefer not to use MyChart, call our clinic nurse coordinator Vandana Bryson RN at " [phone]    We have an on-call number for evenings and weekends. Please call this only if you are having uncontrolled symptoms or serious side effects from your medicines: [phone].     For refills, please give us a week (5 working days) notice. We don't always have providers available everyday to do refills. If you call the day you run out of your medicine, we may not be able to refill it in time, so call 5 days in advance!    Thanh Frances MD MS FAAFP CAQHPM  [facility]  Office [phone]  Fax [phone]

## 2023-08-29 NOTE — PROGRESS NOTES
"Oncology Rooming Note    August 29, 2023 9:39 AM   Patty Soliman is a 73 year old female who presents for:    Chief Complaint   Patient presents with    Palliative     Consult     Initial Vitals: /54   Pulse 90   Temp 98.6  F (37  C)   Resp 18   SpO2 96%  Estimated body mass index is 25.41 kg/m  as calculated from the following:    Height as of 6/2/23: 1.575 m (5' 2\").    Weight as of 7/24/23: 63 kg (138 lb 14.4 oz). There is no height or weight on file to calculate BSA.  Data Unavailable Comment: Data Unavailable   No LMP recorded. Patient is postmenopausal.  Allergies reviewed: Yes  Medications reviewed: Yes    Medications: Medication refills not needed today.  Pharmacy name entered into Quick2LA[facility]CH:    [id number] - [facility], [place] - [address] [id number] - [place], [place] - [address] [facility], [place], [place] - [address] [place] [address]    Clinical concerns: Goals of Care conversation. They brought an Advanced Directive to discuss and niece would like a POLST as well.   Dr. Frances was notified.      Marielos Barnes RN      Palliative Care Outpatient Clinic Consultation Note    Patient:  Patty Soliman    Chief Complaint:   Patty Soliman 73 year old female who is presenting to the palliative medicine clinic today at the request of Dr. Bosch for a palliative care consultation secondary to ILD.   The patient's primary care provider is:  Meg Kahn.     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  O2 dependent 10 l at rest and up to 15 lpm with activity.  Lives alone and has cleaning help; o/w independent in ADLs.    Distressing Symptom/s: dyspnea much worse with exertion; can drop O2 sats into 70's and 80's with exertion with quick recovery.  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)  Living Situation: single level town home alone  Relationships:  to Johnnie for 28 years until his death on 2014  Children: none  Actual/Potential Caregiver(s): marge Wheeler  Support System: neighbors, Summer and another niece; her brother and nephew  Occupation: worked at [employer] for 36 years; finished as a   Hobbies: now not much due to illness; previously liked to travel [place] to [place]; riding motorcycle trikes;   [place] a good friend, telling it like it is, whether someone wants to hear it or not'  Substance Use/History of misuse: none  Financial Concerns: none  Spiritual Background: raised a Confucianism and in her heart feels she is one but she is not a member of a [employer] since her 's death  Spiritual Concerns/Needs: none    Social History     Tobacco Use    Smoking status: Never    Smokeless tobacco: Never   Substance Use Topics    Alcohol use: Yes     Comment: Alcoholic Drinks/day: rarely    Drug use: No       Family History  No family history on file.  Patient's Involvement with Prior History of Serious Illness in Family:   at home of cancer with hospice    Advance Care Planning:  Advance Directive:    in process; perhaps living will has been done.  Where is written copy located: home  Health Care Agent Contact Information: marge Wheeler  POLST:   completed today  CODE STATUS: DNAR/DNI    Allergies   Allergen Reactions    Escitalopram Oxalate [Escitalopram] Unknown     Other reaction(s): fatigue    Sulfa (Sulfonamide Antibiotics) [Sulfa Antibiotics] Unknown    Sulfamethoxazole-Trimethoprim [Sulfamethoxazole-Trimethoprim] Unknown     Other reaction(s): Unknown     Current Outpatient Medications   Medication Sig Dispense Refill     aspirin 325 MG tablet [ASPIRIN 325 MG TABLET] daily.      atorvastatin (LIPITOR) 10 MG tablet [ATORVASTATIN (LIPITOR) 10 MG TABLET] daily.      cholecalciferol, vitamin D3, 1,000 unit tablet [CHOLECALCIFEROL, VITAMIN D3, 1,000 UNIT TABLET] Take 2,000 Units by mouth daily.      coenzyme Q10 200 mg capsule [COENZYME Q10 200 MG CAPSULE] Take 200 mg by mouth daily.      dapsone (ACZONE) 100 MG tablet TAKE 1 TABLET BY MOUTH EVERY DAY 90 tablet 1    fluticasone (FLOVENT HFA) 220 MCG/ACT inhaler Inhale 2 puffs into the lungs 2 times daily 12 g 11    furosemide (LASIX) 20 MG tablet Take 1 tablet (20 mg) by mouth daily 90 tablet 3    metFORMIN (GLUCOPHAGE) 500 MG tablet Take 1,000 mg by mouth daily (with dinner) HS      metoprolol tartrate (LOPRESSOR) 25 MG tablet [METOPROLOL TARTRATE (LOPRESSOR) 25 MG TABLET] Take 25 mg by mouth daily.       mycophenolate (GENERIC EQUIVALENT) 500 MG tablet Take 2 tablets (1,000 mg) by mouth 2 times daily 120 tablet 3    omeprazole (PRILOSEC) 20 MG capsule [OMEPRAZOLE (PRILOSEC) 20 MG CAPSULE] Take 20 mg by mouth daily before breakfast.       potassium chloride ER (KLOR-CON M) 20 MEQ CR tablet Take 2 tablets (40 mEq) by mouth 2 times daily 180 tablet 3    sildenafil (REVATIO) 20 MG tablet Take 1 tablet (20 mg) by mouth 3 times daily 90 tablet 11    Treprostinil 64 MCG POWD Inhale 64 mcg into the lungs 4 times daily      dorzolamide-timolol (COSOPT) 22.3-6.8 mg/mL ophthalmic solution [DORZOLAMIDE-TIMOLOL (COSOPT) 22.3-6.8 MG/ML OPHTHALMIC SOLUTION] 1 gtt (Patient not taking: Reported on 8/29/2023)       Past Medical History:   Diagnosis Date    Cancer (H)     Diabetes mellitus (H)     Type 2 Diabetic    Hypertension      Past Surgical History:   Procedure Laterality Date    CV RIGHT HEART CATH MEASUREMENTS RECORDED N/A 8/19/2022    Procedure: Heart Cath Right Heart Cath;  Surgeon: Daniel Sanchez MD;  Location:  HEART CARDIAC CATH LAB    CV RIGHT HEART CATH MEASUREMENTS RECORDED N/A  3/15/2023    Procedure: Heart Cath Right Heart Cath;  Surgeon: Bernardo Suarez MD;  Location:  HEART CARDIAC CATH LAB    OH MASTECTOMY,PARTIAL,  WITH AXILLARY LYMPHADENECTOMY Right 4/18/2018    Procedure: Right Lumpectomy after Wire Localization; [place] Lymph Node Biopsy;  Surgeon: Katie Sahu MD;  Location: [facility];  Service: General    TUBAL LIGATION         REVIEW OF SYSTEMS:   ROS: 10 point ROS neg other than the symptoms noted above in the HPI and here:  Palliative Symptom Review (0=no symptom/no concern, 1=mild, 2=moderate, 3=severe):      Pain: 0      Fatigue: 0      Nausea: 0      Constipation: 0      Diarrhea: 1-2      Depressive Symptoms: 0      Anxiety: 1      Drowsiness: 0      Poor Appetite: spotty      Shortness of Breath: 1-2      Insomnia: had been good until some recent med changes      Overall (0 good/no concerns, 3 very poor):  1-2    GENERAL APPEARANCE: healthy, alert and no distress; neatly groomed  EYES: Eyes grossly normal to inspection, PERRLA, conjunctivae and sclerae without injection or discharge, EOM intact   RESP:  no increased work of breathing; speaks in brief sentences--conversationally dyspneic;   MS: No musculoskeletal defects are noted  SKIN: No suspicious lesions or rashes, hydration status appears adequate with normal skin turgor   PSYCH: Alert and oriented x3; speech- coherent , normal rate and volume; able to articulate logical thoughts, able to abstract reason, no tangential thoughts, no hallucinations or delusions, mentation appears normal, Mood is euthymic. Affect is appropriate for this mood state and bright. Thought content is free of suicidal ideation, hallucinations, and delusions.  Eye contact is good during conversation.       Data Reviewed:  LABS: 7/24/2023 Cr 0.83; albumin 4.1; Hgb 12    Results:  RHC 8/19/22  RA 9/16/9 mmHg  RV 65/11 mmHg  PA 64/20/37 mmHg  CWP 21/22/13 mmHg  CO (Terrance) 1.77 L/min  CI (Terrance) 1.11 L/min/m2  CO (TD) 2.45 L/min  CI (TD)  1.54 L/min/m2   mmHg   SVR 4,338 dynes (by measured Terrance)  PVR 13.56 GRAFF (by measured Terrance)  PVR 9.8 (by TD)      No significant response to vasodilator therapy.      PFTs 6/2/23            Interpretation:   Isolated severe reduction in DLCO in keeping with pulmonary vascular disease initially and mild degree of restriction by TLC 2/2023.        Impressions:  Palliative Performance Score:  (100% normal, 0% death):  50%  Decision Making Capacity:  very present  PDMP review:  yes, no concerns    ILD/HP  BOYER  Anxiety      GOALS OF CARE:  PATTY HAS DECIDED SHE WOULD LIKE A PEACEFUL DEATH WHEN IT COMES AND SO DOES NOT WANT CPR OR INTUBATION/MECHANICAL VENTILATION.  I wrote orders to that effect and completed a POLST form. She isn't sure about artificial nutrition.  She is open to future hospitalizations and, in general, wants care to continue, if it appears she is making progress towards getting healthier.  She thinks she would want other advanced care like IV fluids or antibiotics, but it will be situational.  She promised to continue speaking with her HCA to outline what is important and what she worries about.  She will complete a new ACP. She wants to die at home, if at all possible, and wants paid caregivers attending to her so her family does not have to provide nursing cares.     Advance Care Planning Discussion 8/29/2023. I, Thanh Frances MD met with Patient and their family today at the clinic to discuss Advance Care Planning. Patty Soliman does have decisional capacity and was present for this discussion.  Those present were informed of the voluntary nature of this discussion and wished to proceed.  The discussion included:  see immediate paragraph above . This discussion began at 0935  and ended at 1005 for a total of 30 minutes.     Recommendations & Counseling:  I offered Patty a trial of morphine 2 mg po TID and she declined it.  She will consider it.    I don't think guaifenesin has much to offer  her.    I encouraged her to get covid boosters and seasonal influenza vaccines this fall.    I requested follow up in 4 months; sooner prn and to request PC consult if hospitalized.    I completed a POLST and forwarded a copy to the [facility] office and sent the original home with her.    Counseling: All of the above was explained to the patient in lay language. The patient has verbalized a clear understanding of the discussion, asked appropriate questions, which have been answered to patient's apparent satisfaction. The patient is in agreement with the above plan.    82 minutes spent on the date of the encounter doing chart review, history and exam, patient education & counseling, documentation and other activities as noted above.  30 of the 82 minutes were spent in the ACP discussion as documented above.    Thanh Frances MD MS FAAFP CAQHPM  [facility]  Office [phone]  Fax [phone]

## 2023-08-29 NOTE — LETTER
"    8/29/2023         RE: Patty Soliman  [address]  [facility] [address]        Dear Colleague,    Thank you for referring your patient, Patty Soliman, to the [facility] RADIATION ONCOLOGY [place]. Please see a copy of my visit note below.    Oncology Rooming Note    August 29, 2023 9:39 AM   Patty Soliman is a 73 year old female who presents for:    Chief Complaint   Patient presents with     Palliative     Consult     Initial Vitals: /54   Pulse 90   Temp 98.6  F (37  C)   Resp 18   SpO2 96%  Estimated body mass index is 25.41 kg/m  as calculated from the following:    Height as of 6/2/23: 1.575 m (5' 2\").    Weight as of 7/24/23: 63 kg (138 lb 14.4 oz). There is no height or weight on file to calculate BSA.  Data Unavailable Comment: Data Unavailable   No LMP recorded. Patient is postmenopausal.  Allergies reviewed: Yes  Medications reviewed: Yes    Medications: Medication refills not needed today.  Pharmacy name entered into [employer]:    [id number] - [facility], [place] - [address] [id number] - [place], [place] - [address] - [place] - [facility]    Clinical concerns: Goals of Care conversation. They brought an Advanced Directive to discuss and niece would like a POLST as well.   Dr. Frances was notified.      Marielos Barnes RN      Palliative Care Outpatient Clinic Consultation Note    Patient:  Patty Soliman    Chief Complaint:   Patty Soliman 73 year old female who is presenting to the palliative medicine clinic today at the request of Dr. Bosch for a palliative care consultation secondary to ILD.   The patient's primary care provider is:  Meg Kahn.     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  O2 dependent 10 l at rest and up to 15 lpm with activity.  Lives alone and has cleaning help; o/w independent in ADLs.    Distressing Symptom/s: dyspnea " much worse with exertion; can drop O2 sats into 70's and 80's with exertion with quick recovery.  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)  Living Situation: single level town home alone  Relationships:  to Johnnie for 28 years until his death on 2014  Children: none  Actual/Potential Caregiver(s): marge Wheeler  Support System: neighbors, Summer and another bradlyece; her brother and nephew  Occupation: worked at [employer] for 36 years; finished as a   Hobbies: now not much due to illness; previously liked to travel [place] to [place]; riding motorcycle trikes;   [place] being a good friend, telling it like it is, whether someone wants to hear it or not'  Substance Use/History of misuse: none  Financial Concerns: none  Spiritual Background: raised a Islam and in her heart feels she is one but she is not a member of a [employer] since her 's death  Spiritual Concerns/Needs: none    Social History     Tobacco Use     Smoking status: Never     Smokeless tobacco: Never   Substance Use Topics     Alcohol use: Yes     Comment: Alcoholic Drinks/day: rarely     Drug use: No       Family History  No family history on file.  Patient's Involvement with Prior History of Serious Illness in Family:   at home of cancer with hospice    Advance Care Planning:  Advance Directive:    in process; perhaps living will has been done.  Where is written copy located: home  Health Care Agent Contact Information: marge Wheeler  POLST:   completed today  CODE STATUS: DNAR/DNI    Allergies   Allergen Reactions     Escitalopram Oxalate [Escitalopram] Unknown      Other reaction(s): fatigue     Sulfa (Sulfonamide Antibiotics) [Sulfa Antibiotics] Unknown     Sulfamethoxazole-Trimethoprim [Sulfamethoxazole-Trimethoprim] Unknown     Other reaction(s): Unknown     Current Outpatient Medications   Medication Sig Dispense Refill     aspirin 325 MG tablet [ASPIRIN 325 MG TABLET] daily.       atorvastatin (LIPITOR) 10 MG tablet [ATORVASTATIN (LIPITOR) 10 MG TABLET] daily.       cholecalciferol, vitamin D3, 1,000 unit tablet [CHOLECALCIFEROL, VITAMIN D3, 1,000 UNIT TABLET] Take 2,000 Units by mouth daily.       coenzyme Q10 200 mg capsule [COENZYME Q10 200 MG CAPSULE] Take 200 mg by mouth daily.       dapsone (ACZONE) 100 MG tablet TAKE 1 TABLET BY MOUTH EVERY DAY 90 tablet 1     fluticasone (FLOVENT HFA) 220 MCG/ACT inhaler Inhale 2 puffs into the lungs 2 times daily 12 g 11     furosemide (LASIX) 20 MG tablet Take 1 tablet (20 mg) by mouth daily 90 tablet 3     metFORMIN (GLUCOPHAGE) 500 MG tablet Take 1,000 mg by mouth daily (with dinner) HS       metoprolol tartrate (LOPRESSOR) 25 MG tablet [METOPROLOL TARTRATE (LOPRESSOR) 25 MG TABLET] Take 25 mg by mouth daily.        mycophenolate (GENERIC EQUIVALENT) 500 MG tablet Take 2 tablets (1,000 mg) by mouth 2 times daily 120 tablet 3     omeprazole (PRILOSEC) 20 MG capsule [OMEPRAZOLE (PRILOSEC) 20 MG CAPSULE] Take 20 mg by mouth daily before breakfast.        potassium chloride ER (KLOR-CON M) 20 MEQ CR tablet Take 2 tablets (40 mEq) by mouth 2 times daily 180 tablet 3     sildenafil (REVATIO) 20 MG tablet Take 1 tablet (20 mg) by mouth 3 times daily 90 tablet 11     Treprostinil 64 MCG POWD Inhale 64 mcg into the lungs 4 times daily       dorzolamide-timolol (COSOPT) 22.3-6.8 mg/mL ophthalmic solution [DORZOLAMIDE-TIMOLOL (COSOPT) 22.3-6.8 MG/ML OPHTHALMIC SOLUTION] 1 gtt (Patient not taking: Reported on 8/29/2023)       Past Medical History:   Diagnosis Date     Cancer (H)      Diabetes mellitus (H)     Type 2 Diabetic      Hypertension      Past Surgical History:   Procedure Laterality Date     CV RIGHT HEART CATH MEASUREMENTS RECORDED N/A 8/19/2022    Procedure: Heart Cath Right Heart Cath;  Surgeon: Daniel Sanchez MD;  Location:  HEART CARDIAC CATH LAB     CV RIGHT HEART CATH MEASUREMENTS RECORDED N/A 3/15/2023    Procedure: Heart Cath Right Heart Cath;  Surgeon: Bernardo Suarez MD;  Location:  HEART CARDIAC CATH LAB     SD MASTECTOMY,PARTIAL,  WITH AXILLARY LYMPHADENECTOMY Right 4/18/2018    Procedure: Right Lumpectomy after Wire Localization; [place] Lymph Node Biopsy;  Surgeon: Katie Sahu MD;  Location: [facility];  Service: General     TUBAL LIGATION         REVIEW OF SYSTEMS:   ROS: 10 point ROS neg other than the symptoms noted above in the HPI and here:  Palliative Symptom Review (0=no symptom/no concern, 1=mild, 2=moderate, 3=severe):      Pain: 0      Fatigue: 0      Nausea: 0      Constipation: 0      Diarrhea: 1-2      Depressive Symptoms: 0      Anxiety: 1      Drowsiness: 0      Poor Appetite: spotty      Shortness of Breath: 1-2      Insomnia: had been good until some recent med changes      Overall (0 good/no concerns, 3 very poor):  1-2    GENERAL APPEARANCE: healthy, alert and no distress; neatly groomed  EYES: Eyes grossly normal to inspection, PERRLA, conjunctivae and sclerae without injection or discharge, EOM intact   RESP:  no increased work of breathing; speaks in brief sentences--conversationally dyspneic;   MS: No musculoskeletal defects are noted  SKIN: No suspicious lesions or rashes, hydration status appears adequate with normal skin turgor   PSYCH: Alert and oriented x3; speech- coherent , normal rate and volume; able to articulate logical thoughts, able to abstract reason, no tangential thoughts, no hallucinations or delusions, mentation appears normal, Mood is euthymic. Affect is appropriate for this mood state and bright. Thought content is free of suicidal ideation,  hallucinations, and delusions.  Eye contact is good during conversation.       Data Reviewed:  LABS: 7/24/2023 Cr 0.83; albumin 4.1; Hgb 12    Results:  RHC 8/19/22  RA 9/16/9 mmHg  RV 65/11 mmHg  PA 64/20/37 mmHg  CWP 21/22/13 mmHg  CO (Terrance) 1.77 L/min  CI (Terrance) 1.11 L/min/m2  CO (TD) 2.45 L/min  CI (TD) 1.54 L/min/m2   mmHg   SVR 4,338 dynes (by measured Terrance)  PVR 13.56 GRAFF (by measured Terrance)  PVR 9.8 (by TD)      No significant response to vasodilator therapy.      PFTs 6/2/23            Interpretation:   Isolated severe reduction in DLCO in keeping with pulmonary vascular disease initially and mild degree of restriction by TLC 2/2023.        Impressions:  Palliative Performance Score:  (100% normal, 0% death):  50%  Decision Making Capacity:  very present  PDMP review:  yes, no concerns    ILD/HP  BOYER  Anxiety      GOALS OF CARE:  PATTY HAS DECIDED SHE WOULD LIKE A PEACEFUL DEATH WHEN IT COMES AND SO DOES NOT WANT CPR OR INTUBATION/MECHANICAL VENTILATION.  I wrote orders to that effect and completed a POLST form. She isn't sure about artificial nutrition.  She is open to future hospitalizations and, in general, wants care to continue, if it appears she is making progress towards getting healthier.  She thinks she would want other advanced care like IV fluids or antibiotics, but it will be situational.  She promised to continue speaking with her HCA to outline what is important and what she worries about.  She will complete a new ACP. She wants to die at home, if at all possible, and wants paid caregivers attending to her so her family does not have to provide nursing cares.     Advance Care Planning Discussion 8/29/2023. IThanh MD met with Patient and their family today at the clinic to discuss Advance Care Planning. aPtty Soliman does have decisional capacity and was present for this discussion.  Those present were informed of the voluntary nature of this discussion and wished to proceed.   The discussion included:  see immediate paragraph above . This discussion began at 0935  and ended at 1005 for a total of 30 minutes.     Recommendations & Counseling:  I offered Patty a trial of morphine 2 mg po TID and she declined it.  She will consider it.    I don't think guaifenesin has much to offer her.    I encouraged her to get covid boosters and seasonal influenza vaccines this fall.    I requested follow up in 4 months; sooner prn and to request PC consult if hospitalized.    I completed a POLST and forwarded a copy to the [employer] office and sent the original home with her.    Counseling: All of the above was explained to the patient in lay language. The patient has verbalized a clear understanding of the discussion, asked appropriate questions, which have been answered to patient's apparent satisfaction. The patient is in agreement with the above plan.    82 minutes spent on the date of the encounter doing chart review, history and exam, patient education & counseling, documentation and other activities as noted above.  30 of the 82 minutes were spent in the ACP discussion as documented above.    Thanh Frances MD MS FAAFP CAQHPM  [facility] Palliative Care Service  Office [phone]  Fax [phone]                Again, thank you for allowing me to participate in the care of your patient.        Sincerely,        Thanh Frances MD

## 2023-09-08 DIAGNOSIS — J67.9 HYPERSENSITIVITY PNEUMONIA (H): ICD-10-CM

## 2023-09-08 DIAGNOSIS — J84.9 ILD (INTERSTITIAL LUNG DISEASE) (H): Primary | ICD-10-CM

## 2023-09-08 RX ORDER — PREDNISONE 10 MG/1
10 TABLET ORAL DAILY
Status: CANCELLED | OUTPATIENT
Start: 2023-09-08

## 2023-09-08 RX ORDER — PREDNISONE 10 MG/1
10 TABLET ORAL DAILY
Qty: 30 TABLET | Refills: 3 | Status: SHIPPED | OUTPATIENT
Start: 2023-09-08 | End: 2024-02-26

## 2023-09-08 RX ORDER — PREDNISONE 10 MG/1
10 TABLET ORAL DAILY
Qty: 30 TABLET | Refills: 3 | OUTPATIENT
Start: 2023-09-08

## 2023-09-08 NOTE — TELEPHONE ENCOUNTER
Spoke to pt, verified prednisone dose of 10mg daily. New rx sent to same pharmacy as last pred rx.    Audra Richardson, RN, BSN  ILD Nurse   [phone]

## 2023-09-16 DIAGNOSIS — R60.9 EDEMA: ICD-10-CM

## 2023-09-20 ENCOUNTER — MYC MEDICAL ADVICE (OUTPATIENT)
Dept: PULMONOLOGY | Facility: CLINIC | Age: 73
End: 2023-09-20
Payer: MEDICARE

## 2023-09-20 RX ORDER — FUROSEMIDE 20 MG
20 TABLET ORAL DAILY
Qty: 90 TABLET | Refills: 3 | Status: SHIPPED | OUTPATIENT
Start: 2023-09-20 | End: 2023-10-31

## 2023-09-20 NOTE — TELEPHONE ENCOUNTER
Last Clinic Visit: 7/24/2023  [facility]

## 2023-10-14 ENCOUNTER — HEALTH MAINTENANCE LETTER (OUTPATIENT)
Age: 73
End: 2023-10-14

## 2023-10-27 ENCOUNTER — LAB (OUTPATIENT)
Dept: LAB | Facility: CLINIC | Age: 73
End: 2023-10-27
Attending: INTERNAL MEDICINE
Payer: MEDICARE

## 2023-10-27 ENCOUNTER — TELEPHONE (OUTPATIENT)
Dept: PULMONOLOGY | Facility: CLINIC | Age: 73
End: 2023-10-27

## 2023-10-27 ENCOUNTER — MYC MEDICAL ADVICE (OUTPATIENT)
Dept: PULMONOLOGY | Facility: CLINIC | Age: 73
End: 2023-10-27

## 2023-10-27 ENCOUNTER — OFFICE VISIT (OUTPATIENT)
Dept: PULMONOLOGY | Facility: CLINIC | Age: 73
End: 2023-10-27
Attending: INTERNAL MEDICINE
Payer: MEDICARE

## 2023-10-27 VITALS — OXYGEN SATURATION: 94 % | SYSTOLIC BLOOD PRESSURE: 156 MMHG | DIASTOLIC BLOOD PRESSURE: 78 MMHG | HEART RATE: 80 BPM

## 2023-10-27 DIAGNOSIS — N17.9 AKI (ACUTE KIDNEY INJURY) (H): ICD-10-CM

## 2023-10-27 DIAGNOSIS — I27.20 PULMONARY HTN (H): ICD-10-CM

## 2023-10-27 DIAGNOSIS — J84.9 ILD (INTERSTITIAL LUNG DISEASE) (H): ICD-10-CM

## 2023-10-27 DIAGNOSIS — Z23 NEED FOR VACCINATION: Primary | ICD-10-CM

## 2023-10-27 DIAGNOSIS — J84.9 ILD (INTERSTITIAL LUNG DISEASE) (H): Primary | ICD-10-CM

## 2023-10-27 DIAGNOSIS — R06.09 DOE (DYSPNEA ON EXERTION): ICD-10-CM

## 2023-10-27 LAB
ALBUMIN SERPL BCG-MCNC: 4.1 G/DL (ref 3.5–5.2)
ALP SERPL-CCNC: 183 U/L (ref 35–104)
ALT SERPL W P-5'-P-CCNC: 27 U/L (ref 0–50)
ANION GAP SERPL CALCULATED.3IONS-SCNC: 10 MMOL/L (ref 7–15)
AST SERPL W P-5'-P-CCNC: 28 U/L (ref 0–45)
BASOPHILS # BLD AUTO: 0.1 10E3/UL (ref 0–0.2)
BASOPHILS NFR BLD AUTO: 0 %
BILIRUB SERPL-MCNC: 1 MG/DL
BUN SERPL-MCNC: 15.5 MG/DL (ref 8–23)
CALCIUM SERPL-MCNC: 9.6 MG/DL (ref 8.8–10.2)
CHLORIDE SERPL-SCNC: 101 MMOL/L (ref 98–107)
CREAT SERPL-MCNC: 0.99 MG/DL (ref 0.51–0.95)
CYSTATIN C (ROCHE): 1.5 MG/L (ref 0.6–1)
DEPRECATED HCO3 PLAS-SCNC: 30 MMOL/L (ref 22–29)
EGFRCR SERPLBLD CKD-EPI 2021: 59 ML/MIN/1.73M2
EOSINOPHIL # BLD AUTO: 0.2 10E3/UL (ref 0–0.7)
EOSINOPHIL NFR BLD AUTO: 1 %
ERYTHROCYTE [DISTWIDTH] IN BLOOD BY AUTOMATED COUNT: 14.2 % (ref 10–15)
GFR SERPL CREATININE-BSD FRML MDRD: 40 ML/MIN/1.73M2
GLUCOSE SERPL-MCNC: 194 MG/DL (ref 70–99)
HCT VFR BLD AUTO: 38.4 % (ref 35–47)
HGB BLD-MCNC: 12.1 G/DL (ref 11.7–15.7)
IMM GRANULOCYTES # BLD: 0.2 10E3/UL
IMM GRANULOCYTES NFR BLD: 2 %
LYMPHOCYTES # BLD AUTO: 2.5 10E3/UL (ref 0.8–5.3)
LYMPHOCYTES NFR BLD AUTO: 18 %
MCH RBC QN AUTO: 31.4 PG (ref 26.5–33)
MCHC RBC AUTO-ENTMCNC: 31.5 G/DL (ref 31.5–36.5)
MCV RBC AUTO: 100 FL (ref 78–100)
MONOCYTES # BLD AUTO: 1.5 10E3/UL (ref 0–1.3)
MONOCYTES NFR BLD AUTO: 11 %
NEUTROPHILS # BLD AUTO: 9.5 10E3/UL (ref 1.6–8.3)
NEUTROPHILS NFR BLD AUTO: 68 %
NRBC # BLD AUTO: 0 10E3/UL
NRBC BLD AUTO-RTO: 0 /100
PLATELET # BLD AUTO: 185 10E3/UL (ref 150–450)
POTASSIUM SERPL-SCNC: 3.8 MMOL/L (ref 3.4–5.3)
PROT SERPL-MCNC: 6.4 G/DL (ref 6.4–8.3)
RBC # BLD AUTO: 3.85 10E6/UL (ref 3.8–5.2)
SODIUM SERPL-SCNC: 141 MMOL/L (ref 135–145)
WBC # BLD AUTO: 14 10E3/UL (ref 4–11)

## 2023-10-27 PROCEDURE — 250N000011 HC RX IP 250 OP 636: Performed by: INTERNAL MEDICINE

## 2023-10-27 PROCEDURE — 82610 CYSTATIN C: CPT | Performed by: INTERNAL MEDICINE

## 2023-10-27 PROCEDURE — 90662 IIV NO PRSV INCREASED AG IM: CPT | Performed by: INTERNAL MEDICINE

## 2023-10-27 PROCEDURE — 85025 COMPLETE CBC W/AUTO DIFF WBC: CPT | Performed by: PATHOLOGY

## 2023-10-27 PROCEDURE — 99215 OFFICE O/P EST HI 40 MIN: CPT | Mod: 25 | Performed by: INTERNAL MEDICINE

## 2023-10-27 PROCEDURE — 99000 SPECIMEN HANDLING OFFICE-LAB: CPT | Performed by: PATHOLOGY

## 2023-10-27 PROCEDURE — G0463 HOSPITAL OUTPT CLINIC VISIT: HCPCS | Mod: 25 | Performed by: INTERNAL MEDICINE

## 2023-10-27 PROCEDURE — G0008 ADMIN INFLUENZA VIRUS VAC: HCPCS | Performed by: INTERNAL MEDICINE

## 2023-10-27 PROCEDURE — 94729 DIFFUSING CAPACITY: CPT | Performed by: INTERNAL MEDICINE

## 2023-10-27 PROCEDURE — 94375 RESPIRATORY FLOW VOLUME LOOP: CPT | Performed by: INTERNAL MEDICINE

## 2023-10-27 PROCEDURE — 80053 COMPREHEN METABOLIC PANEL: CPT | Performed by: PATHOLOGY

## 2023-10-27 PROCEDURE — 36415 COLL VENOUS BLD VENIPUNCTURE: CPT | Performed by: PATHOLOGY

## 2023-10-27 PROCEDURE — 94726 PLETHYSMOGRAPHY LUNG VOLUMES: CPT | Performed by: INTERNAL MEDICINE

## 2023-10-27 PROCEDURE — 83880 ASSAY OF NATRIURETIC PEPTIDE: CPT | Performed by: PATHOLOGY

## 2023-10-27 RX ADMIN — INFLUENZA A VIRUS A/VICTORIA/4897/2022 IVR-238 (H1N1) ANTIGEN (FORMALDEHYDE INACTIVATED), INFLUENZA A VIRUS A/DARWIN/9/2021 SAN-010 (H3N2) ANTIGEN (FORMALDEHYDE INACTIVATED), INFLUENZA B VIRUS B/PHUKET/3073/2013 ANTIGEN (FORMALDEHYDE INACTIVATED), AND INFLUENZA B VIRUS B/MICHIGAN/01/2021 ANTIGEN (FORMALDEHYDE INACTIVATED) 0.7 ML: 60; 60; 60; 60 INJECTION, SUSPENSION INTRAMUSCULAR at 10:37

## 2023-10-27 ASSESSMENT — PAIN SCALES - GENERAL: PAINLEVEL: NO PAIN (0)

## 2023-10-27 NOTE — TELEPHONE ENCOUNTER
----- Message from Shannan Rodriguez MD sent at 10/27/2023  3:51 PM CDT -----  CORDELL by cystatin c, we already discussed and repeating labs locally in two weeks per our visit from today. Can you kindly call to hold MMF until she gets her blood rechecked?

## 2023-10-27 NOTE — PATIENT INSTRUCTIONS
Blood work in two weeks in local lab     Reducing Flovent back to one puff, twice a day, with a spacer     Follow up with palliative care     Video for pulmonary rehab:  Please follow the instructions on this video to the best you can, repeat all exercises 4 times per week.     [email or web address]    Please call our direct ILD nurses line for questions and concerns: [phone]    For scheduling, please call: [phone]

## 2023-10-27 NOTE — LETTER
10/27/2023         RE: Patty Soliman  [address]  [facility] [address]        Dear Colleague,    Thank you for referring your patient, Patty Soliman, to the [facility] FOR LUNG SCIENCE AND [facility] [place]. Please see a copy of my visit note below.    [facility] Interstitial Lung Disease Clinic    Reason for Visit  Patty Soliman is a 72 year old year old female who is being seen for shortness of breath.   HPI from previous visit   Patient is 72 years old female with past history outlined below presents today to establish care with us in the ILD clinic.  She had progressive exertional dyspnea over the last several years but does not seem to be much worse in the last 6 months.  Back in August 2020, CTA was negative for pulmonary embolism, ILD described as probable UIP pattern was observed.  She has been seen by our pulmonary hypertension clinic for severe PH by echocardiography, RVSP 60+ with structural and functional impairment of the RV.  She underwent right heart cath and pulmonary hypertension was confirmed with negative vasoreactivity testing. Her cardiac index was also noticed to be reduced. She was recently started on ilioprost and sildenafil, with some symptomatic improvement. Cardiology was not convinced that the degree of ILD explains her pulmonary hypertension, which I do agree with based on the CT scan from August 2022.  However, the most recent CT scan from today shows significant progression in ILD.  Her pattern is inconsistent with UIP to me and there is significant mosaicism and signs of small airway disease suggestive of hypersensitivity pneumonitis.  She did not have a trial of steroids before.  Neither does she have an inhaler or nebulizer treatments attempted.  She does have some down products like a jacket and a comforter, but no other triggers for HP by history.  She denies connective tissue disease symptoms with the exception to dysphagia which  seems to be early phase and has not been worked up before.  She does not have Raynaud's phenomena, arthralgias, muscle stiffness, oral ulcers, epistaxis, sinus symptoms, skin rash, skin ulcers, or hematuria.  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    Updates from 2/1/23  Patient returns today for follow up. She continues to have dyspnea at rest, and still using oxygen at 8-10 LPM for several months. She is accompanied by her daughter. We re-increased prednisone to 25 mg daily from 20 given worsening dyspnea.     Updates from 6/2/23  Patient returns today for follow-up.  She continues to require continuous home oxygen, and does have desaturations to the low 80s at times.  She is able to ambulate to the bathroom by herself.  She has been very reluctant to see palliative care and has not made the appointment yet.  She is using portable liquid oxygen, and also follows over instructions for CellCept 1000 mg in the morning and 15 mg in the evening.  She continues to have some stomach irritation, and we reduced the CellCept from 3000 mg daily, we prescribed prednisone 10 mg daily which she continues to take.  She is also having some diarrhea that she thinks is related to the CellCept.  She does not have cough or sputum production.  She has no symptoms to suggest exacerbation or pneumonia.    Updates from today 10/27/23  Patty returns for follow up. She is now keeping her O2 at 10 LPM as she always desaturates on 8 LPM if she does any activity, then she is lagging behind on correcting her oxygenation when she increases it to 10 LPM. She had an episode of tachycardia to 160 with desaturation to 70s% recently after spending sometime outside the house while receiving 10 LPM. She has already seen palliative care and signed a POLST indicated DNR/DNI status. She is still not introduced to hospice idea.     Vitals: BP (!) 156/78 (BP Location: Right arm, Patient Position:  Sitting)   Pulse 80   SpO2 94%     Exam:   GENERAL APPEARANCE: Well developed, well nourished, alert, and in no apparent distress.  RESP: Reduced flow throughout.  No crackles. No rhonchi. No wheezes.  CV: Normal S1, S2, regular rhythm, normal rate. No murmur.   MS: extremities normal. No clubbing. No cyanosis.  SKIN: no rash on limited exam.  NEURO: Mentation intact, speech normal, normal gait and stance.  PSYCH: mentation appears normal. and affect normal/bright.    Results:  RHC 8/19/22  RA 9/16/9 mmHg  RV 65/11 mmHg  PA 64/20/37 mmHg  CWP 21/22/13 mmHg  CO (Terrance) 1.77 L/min  CI (Terrance) 1.11 L/min/m2  CO (TD) 2.45 L/min  CI (TD) 1.54 L/min/m2   mmHg   SVR 4,338 dynes (by measured Terrance)  PVR 13.56 GRAFF (by measured Terrance)  PVR 9.8 (by TD)     No significant response to vasodilator therapy.     PFTs 10/27/23    My interpretation: progression of severe intra-thoracic restriction.           Interpretation:   Isolated severe reduction in DLCO in keeping with pulmonary vascular disease initially and mild degree of restriction by TLC 2/2023.     Chest imaging:  Personally reviewed CT chest images from today 2/1 and compared to 11/2022  Agreed with radiology report:                                                             IMPRESSION: Minimal if any improvement in the subpleural fibrosis in  the right lower lobe with no other change in the interval otherwise.  Diffuse atherosclerosis. Severe mid thoracic dextrocurvature.  Unchanged left adrenal gland hyperplasia.         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 Her ILD seems to be progressive hypersensitivity pneumonitis pattern, based on mosaicism on CT and aitrapping on exhalation. Her exposure is down which she removed from her environment after we discussed in our first visit. She is a lifetime nonsmoker which  is associated with slight increase in HP incidence. Her disease has progressed to chronic type as her TLC is reflecting restriction. She also has significant pulmonary hypertension that is contributing largely to her oxygen needs. Autoimmune serologies have been negative as well as HP panels. We tried a prednisone taper in 2022 but unfortunately no subjective or objective improvement is seen, however, it is possible that it stabilized the disease. Prednisone currently at 10 mg with MMF 1000 BID down from 1500 BID, due to stomach irritation and reflux symptoms, and were worse when we tried mycohenolic acid so she is back on MMF. Esophogram was normal.  Plan:   - Reduce flovent back to one puff BID due to cough induced by inhaler   - Spacer re-provided and instructions explained on how to use with flovent   - Continue MMF monitoring labs, alk phose mildly elevated today so we will recheck it in 2 weeks   - Follow up with Palliative care referral, she has already signed POLST for DNR/DNI, likely appropriate for home with hospice in the next few months      Precapillary pulmonary hypertension  Receiving ilioprost and sildenafil per PH clinic with symptomatic improvement.     3. Dysphagia    4. Chronic hypoxic respiratory failure  Needing 10 LPM with activity  - Continue Portable home O2     5. Osteopenia  Noted on CT chest, Dexa scan confirms with t-score -1.3 of left femoral neck.       Return in 3 months over video    Total time spent on the day of the encounter was 45 minutes     Sincerely,        Shannan Rodriguez MD

## 2023-10-27 NOTE — PROGRESS NOTES
[facility] Interstitial Lung Disease Clinic    Reason for Visit  Patty Soliman is a 72 year old year old female who is being seen for shortness of breath.   HPI from previous visit   Patient is 72 years old female with past history outlined below presents today to establish care with us in the ILD clinic.  She had progressive exertional dyspnea over the last several years but does not seem to be much worse in the last 6 months.  Back in August 2020, CTA was negative for pulmonary embolism, ILD described as probable UIP pattern was observed.  She has been seen by our pulmonary hypertension clinic for severe PH by echocardiography, RVSP 60+ with structural and functional impairment of the RV.  She underwent right heart cath and pulmonary hypertension was confirmed with negative vasoreactivity testing. Her cardiac index was also noticed to be reduced. She was recently started on ilioprost and sildenafil, with some symptomatic improvement. Cardiology was not convinced that the degree of ILD explains her pulmonary hypertension, which I do agree with based on the CT scan from August 2022.  However, the most recent CT scan from today shows significant progression in ILD.  Her pattern is inconsistent with UIP to me and there is significant mosaicism and signs of small airway disease suggestive of hypersensitivity pneumonitis.  She did not have a trial of steroids before.  Neither does she have an inhaler or nebulizer treatments attempted.  She does have some down products like a jacket and a comforter, but no other triggers for HP by history.  She denies connective tissue disease symptoms with the exception to dysphagia which seems to be early phase and has not been worked up before.  She does not have Raynaud's phenomena, arthralgias, muscle stiffness, oral ulcers, epistaxis, sinus symptoms, skin rash, skin ulcers, or hematuria.  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    Updates from 2/1/23  Patient returns today for follow up. She continues to have dyspnea at rest, and still using oxygen at 8-10 LPM for several months. She is accompanied by her daughter. We re-increased prednisone to 25 mg daily from 20 given worsening dyspnea.     Updates from 6/2/23  Patient returns today for follow-up.  She continues to require continuous home oxygen, and does have desaturations to the low 80s at times.  She is able to ambulate to the bathroom by herself.  She has been very reluctant to see palliative care and has not made the appointment yet.  She is using portable liquid oxygen, and also follows over instructions for CellCept 1000 mg in the morning and 15 mg in the evening.  She continues to have some stomach irritation, and we reduced the CellCept from 3000 mg daily, we prescribed prednisone 10 mg daily which she continues to take.  She is also having some diarrhea that she thinks is related to the CellCept.  She does not have cough or sputum production.  She has no symptoms to suggest exacerbation or pneumonia.    Updates from today 10/27/23  Patty returns for follow up. She is now keeping her O2 at 10 LPM as she always desaturates on 8 LPM if she does any activity, then she is lagging behind on correcting her oxygenation when she increases it to 10 LPM. She had an episode of tachycardia to 160 with desaturation to 70s% recently after spending sometime outside the house while receiving 10 LPM. She has already seen palliative care and signed a POLST indicated DNR/DNI status. She is still not introduced to hospice idea.     Vitals: BP (!) 156/78 (BP Location: Right arm, Patient Position: Sitting)   Pulse 80   SpO2 94%     Exam:   GENERAL APPEARANCE: Well developed, well nourished, alert, and in no apparent distress.  RESP: Reduced flow throughout.  No crackles. No rhonchi. No wheezes.  CV: Normal S1, S2, regular rhythm, normal rate. No murmur.   MS: extremities  normal. No clubbing. No cyanosis.  SKIN: no rash on limited exam.  NEURO: Mentation intact, speech normal, normal gait and stance.  PSYCH: mentation appears normal. and affect normal/bright.    Results:  RHC 8/19/22  RA 9/16/9 mmHg  RV 65/11 mmHg  PA 64/20/37 mmHg  CWP 21/22/13 mmHg  CO (Terrance) 1.77 L/min  CI (Terrance) 1.11 L/min/m2  CO (TD) 2.45 L/min  CI (TD) 1.54 L/min/m2   mmHg   SVR 4,338 dynes (by measured Trerance)  PVR 13.56 GRAFF (by measured Terrance)  PVR 9.8 (by TD)     No significant response to vasodilator therapy.     PFTs 10/27/23    My interpretation: progression of severe intra-thoracic restriction.           Interpretation:   Isolated severe reduction in DLCO in keeping with pulmonary vascular disease initially and mild degree of restriction by TLC 2/2023.     Chest imaging:  Personally reviewed CT chest images from today 2/1 and compared to 11/2022  Agreed with radiology report:                                                             IMPRESSION: Minimal if any improvement in the subpleural fibrosis in  the right lower lobe with no other change in the interval otherwise.  Diffuse atherosclerosis. Severe mid thoracic dextrocurvature.  Unchanged left adrenal gland hyperplasia.         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 Her ILD seems to be progressive hypersensitivity pneumonitis pattern, based on mosaicism on CT and aitrapping on exhalation. Her exposure is down which she removed from her environment after we discussed in our first visit. She is a lifetime nonsmoker which is associated with slight increase in HP incidence. Her disease has progressed to chronic type as her TLC is reflecting restriction. She also has significant pulmonary hypertension that is contributing largely to her oxygen needs. Autoimmune serologies have been negative as well as  HP panels. We tried a prednisone taper in 2022 but unfortunately no subjective or objective improvement is seen, however, it is possible that it stabilized the disease. Prednisone currently at 10 mg with MMF 1000 BID down from 1500 BID, due to stomach irritation and reflux symptoms, and were worse when we tried mycohenolic acid so she is back on MMF. Esophogram was normal.  Plan:   - Reduce flovent back to one puff BID due to cough induced by inhaler   - Spacer re-provided and instructions explained on how to use with flovent   - Continue MMF monitoring labs, alk phose mildly elevated today so we will recheck it in 2 weeks   - Follow up with Palliative care referral, she has already signed POLST for DNR/DNI, likely appropriate for home with hospice in the next few months      Precapillary pulmonary hypertension  Receiving ilioprost and sildenafil per PH clinic with symptomatic improvement.     3. Dysphagia    4. Chronic hypoxic respiratory failure  Needing 10 LPM with activity  - Continue Portable home O2     5. Osteopenia  Noted on CT chest, Dexa scan confirms with t-score -1.3 of left femoral neck.       Return in 3 months over video    Total time spent on the day of the encounter was 45 minutes

## 2023-10-27 NOTE — NURSING NOTE
Chief Complaint   Patient presents with    Interstitial Lung Disease (ILD)     ILD follow up      Vitals were taken and medications were reconciled.     Laura CALVOA  10:27 AM

## 2023-10-27 NOTE — TELEPHONE ENCOUNTER
CMP order placed, and staff message sent to Regino to fax to [facility] in [place]. MyC update sent to pt.  Pt reminder set to check results.     Audra Richardson RN, BSN  ILD Nurse   [phone]      ----- Message from Shannan Rodriguez MD sent at 10/27/2023 10:41 AM CDT -----  Pt needs CMP in [facility] in [place] in two weeks to recheck abnormal labs seen today, can you kindly arrange the appointment?    AK

## 2023-10-30 LAB
DLCOUNC-%PRED-PRE: 27 %
DLCOUNC-PRE: 4.88 ML/MIN/MMHG
DLCOUNC-PRED: 17.65 ML/MIN/MMHG
ERV-%PRED-PRE: 48 %
ERV-PRE: 0.41 L
ERV-PRED: 0.84 L
EXPTIME-PRE: 3.57 SEC
FEF2575-%PRED-PRE: 129 %
FEF2575-PRE: 1.97 L/SEC
FEF2575-PRED: 1.52 L/SEC
FEFMAX-%PRED-PRE: 127 %
FEFMAX-PRE: 6.19 L/SEC
FEFMAX-PRED: 4.87 L/SEC
FEV1-%PRED-PRE: 86 %
FEV1-PRE: 1.54 L
FEV1FEV6-PRE: 89 %
FEV1FEV6-PRED: 78 %
FEV1FVC-PRE: 89 %
FEV1FVC-PRED: 79 %
FEV1SVC-PRE: 96 %
FEV1SVC-PRED: 63 %
FIFMAX-PRE: 4.39 L/SEC
FRCPLETH-%PRED-PRE: 65 %
FRCPLETH-PRE: 1.72 L
FRCPLETH-PRED: 2.6 L
FVC-%PRED-PRE: 76 %
FVC-PRE: 1.74 L
FVC-PRED: 2.28 L
IC-%PRED-PRE: 70 %
IC-PRE: 1.19 L
IC-PRED: 1.68 L
RVPLETH-%PRED-PRE: 63 %
RVPLETH-PRE: 1.31 L
RVPLETH-PRED: 2.06 L
TLCPLETH-%PRED-PRE: 63 %
TLCPLETH-PRE: 2.91 L
TLCPLETH-PRED: 4.6 L
VA-%PRED-PRE: 59 %
VA-PRE: 2.48 L
VC-%PRED-PRE: 57 %
VC-PRE: 1.6 L
VC-PRED: 2.81 L

## 2023-10-31 ENCOUNTER — VIRTUAL VISIT (OUTPATIENT)
Dept: CARDIOLOGY | Facility: CLINIC | Age: 73
End: 2023-10-31
Attending: PHYSICIAN ASSISTANT
Payer: MEDICARE

## 2023-10-31 VITALS — WEIGHT: 140 LBS | HEIGHT: 62 IN | BODY MASS INDEX: 25.76 KG/M2

## 2023-10-31 DIAGNOSIS — R60.9 EDEMA, UNSPECIFIED TYPE: ICD-10-CM

## 2023-10-31 DIAGNOSIS — J84.9 ILD (INTERSTITIAL LUNG DISEASE) (H): ICD-10-CM

## 2023-10-31 DIAGNOSIS — R06.09 DOE (DYSPNEA ON EXERTION): ICD-10-CM

## 2023-10-31 DIAGNOSIS — I27.20 PULMONARY HTN (H): ICD-10-CM

## 2023-10-31 LAB — NT-PROBNP SERPL-MCNC: 2330 PG/ML (ref 0–900)

## 2023-10-31 PROCEDURE — 99214 OFFICE O/P EST MOD 30 MIN: CPT | Mod: 95 | Performed by: PHYSICIAN ASSISTANT

## 2023-10-31 RX ORDER — FUROSEMIDE 20 MG
20 TABLET ORAL DAILY
COMMUNITY
Start: 2023-10-31

## 2023-10-31 ASSESSMENT — PAIN SCALES - GENERAL: PAINLEVEL: MILD PAIN (2)

## 2023-10-31 NOTE — NURSING NOTE
Patient confirms medications and allergies are accurate via patients echeck in completion, and or denies any changes since last reviewed/verified.     Is the patient currently in the state of [place]? YES    Visit mode:VIDEO    If the visit is dropped, the patient can be reconnected by: VIDEO VISIT: Text to cell phone:   Telephone Information:   Mobile [phone]       Will anyone else be joining the visit? NO  (If patient encounters technical issues they should call [phone]    How would you like to obtain your AVS? MyChart    Are changes needed to the allergy or medication list? No    Reason for visit: RECHECK    Suzanna CORNELL

## 2023-10-31 NOTE — LETTER
"10/31/2023      RE: Patty Soliman  [address]  [facility] [address]       Dear Colleague,    Thank you for the opportunity to participate in the care of your patient, Patty Soliman, at the [facility] [place] [facility]. Please see a copy of my visit note below.          CARDIOLOGY PH CLINIC VIDEO VISIT    Date of video visit: 10/31/23      Patty Soliman is a 73 year old female who is being evaluated via a billable video visit.      Ht 1.575 m (5' 2\")   Wt 63.5 kg (140 lb)   BMI 25.61 kg/m        MEDICATIONS:  Current Outpatient Medications   Medication Sig Dispense Refill    aspirin 325 MG tablet [ASPIRIN 325 MG TABLET] daily.      atorvastatin (LIPITOR) 10 MG tablet [ATORVASTATIN (LIPITOR) 10 MG TABLET] daily.      cholecalciferol, vitamin D3, 1,000 unit tablet [CHOLECALCIFEROL, VITAMIN D3, 1,000 UNIT TABLET] Take 2,000 Units by mouth daily.      coenzyme Q10 200 mg capsule [COENZYME Q10 200 MG CAPSULE] Take 200 mg by mouth daily.      dapsone (ACZONE) 100 MG tablet TAKE 1 TABLET BY MOUTH EVERY DAY 90 tablet 1    dorzolamide-timolol (COSOPT) 22.3-6.8 mg/mL ophthalmic solution [DORZOLAMIDE-TIMOLOL (COSOPT) 22.3-6.8 MG/ML OPHTHALMIC SOLUTION] 1 gtt (Patient not taking: Reported on 8/29/2023)      fluticasone (FLOVENT HFA) 220 MCG/ACT inhaler Inhale 2 puffs into the lungs 2 times daily 12 g 11    furosemide (LASIX) 20 MG tablet Take 1 tablet (20 mg) by mouth daily 90 tablet 3    metFORMIN (GLUCOPHAGE) 500 MG tablet Take 1,000 mg by mouth daily (with dinner) HS      metoprolol tartrate (LOPRESSOR) 25 MG tablet [METOPROLOL TARTRATE (LOPRESSOR) 25 MG TABLET] Take 25 mg by mouth daily.       mycophenolate (GENERIC EQUIVALENT) 500 MG tablet Take 2 tablets (1,000 mg) by mouth 2 times daily 120 tablet 3    omeprazole (PRILOSEC) 20 MG capsule [OMEPRAZOLE (PRILOSEC) 20 MG CAPSULE] Take 20 mg by mouth daily before breakfast.       potassium chloride ER " (KLOR-CON M) 20 MEQ CR tablet Take 2 tablets (40 mEq) by mouth 2 times daily 180 tablet 3    predniSONE (DELTASONE) 10 MG tablet Take 1 tablet (10 mg) by mouth daily 30 tablet 3    sildenafil (REVATIO) 20 MG tablet Take 1 tablet (20 mg) by mouth 3 times daily 90 tablet 11    Treprostinil 64 MCG POWD Inhale 64 mcg into the lungs 4 times daily         Primary PH cardiologist: Dr. Sanchez        HPI:  Ms. Soliman is a pleasant 73 year old female with a PMHx including DM, HTN, dyslipidemia, CKD, and ILD. She also has pulmonary hypertension in the setting of her ILD, and is maintained on Tyvaso and sildenafil.    She was seen most recently in July at which she sounds to have been at her baseline and no changes were made.    Today, I am meeting back virtually with Patty. She tells me that things are about the same in regard to her breathing. However, she notes continued palpitations at times. She might feel a bit more SOB with this but denies dizziness or presyncope when this occurs. She is using her Lasix only PRN (which sounds like only a few times per month) as she has not had any swelling recently.       Most recent labs reviewed as below.      CURRENT PULMONARY HYPERTENSION REGIMEN:     PAH Rx: Tyvaso 64mcg DPI QID, sildenafil 20mg TID     Diuretics: Lasix 20mg PRN      Oxygen: 10L NC      Anticoagulation: None     Pulm: Dr. Rodriguez         Assessment/Plan:     1. Pulmonary hypertension.              --Ms. Soliman has moderate to severe PAH with RV dysfunction. She is on Tyvaso DPI 64mcg QID, along with sildenafil. Clinically, she sounds to be stable from a cardiopulmonary standpoint and last echo from March of this year showed improvement in RV function and degree of TR.               --As today is a virtual visit I cannot fully assess her volume status. However, she reports no recent edema and is using her Lasix only PRN. Renal function appears acceptable (though I note her MMF is being temporarily held per pulm). Will  also add on NT-proBNP for trending purposes.               --Continue to follow in the ILD clinic. She is on immunosuppression and appears to be steroid dependent. She remains on supplemental oxygen as above as well.     2. Palpitations.   --She has a hx of SVT and is maintained on low dose metoprolol. It is unclear whether her palpitations are more than usual. I offered a repeat Ziopatch, but asked her to call should these worsen.       Follow up plan: Return in 3 months to see Dr. Sanchez with repeat labs and 6MWT. I asked the patient to call sooner with any new concerns.         Testing/labs:    Most recent labs:      Latest Reference Range & Units 10/27/23 09:20   Sodium 135 - 145 mmol/L 141   Potassium 3.4 - 5.3 mmol/L 3.8   Chloride 98 - 107 mmol/L 101   Carbon Dioxide (CO2) 22 - 29 mmol/L 30 (H)   Urea Nitrogen 8.0 - 23.0 mg/dL 15.5   Creatinine 0.51 - 0.95 mg/dL 0.99 (H)   GFR Estimate >60 mL/min/1.73m2 59 (L)   Cystatin C 0.6 - 1.0 mg/L 1.5 (H)   GFR Calculated with Cystatin C >=60 mL/min/1.73m2 40 (L)   Calcium 8.8 - 10.2 mg/dL 9.6   Anion Gap 7 - 15 mmol/L 10   Albumin 3.5 - 5.2 g/dL 4.1   Protein Total 6.4 - 8.3 g/dL 6.4   Alkaline Phosphatase 35 - 104 U/L 183 (H)   ALT 0 - 50 U/L 27   AST 0 - 45 U/L 28   Bilirubin Total <=1.2 mg/dL 1.0   Glucose 70 - 99 mg/dL 194 (H)   WBC 4.0 - 11.0 10e3/uL 14.0 (H)   Hemoglobin 11.7 - 15.7 g/dL 12.1   Hematocrit 35.0 - 47.0 % 38.4   Platelet Count 150 - 450 10e3/uL 185   RBC Count 3.80 - 5.20 10e6/uL 3.85   MCV 78 - 100 fL 100   (H): Data is abnormally high  (L): Data is abnormally low      Other most recent pertinent testing:    [facility] 3/15/2023  Hemodynamics    Right Heart Catheterization:  /80/112 mmHg   BPM    RA 8/10/7 mmHg  RV 90/7 mmHg  PA 90/29/49 mmHg  PCW 11/11/9 mmHg  Terrance CO 3.71 L/min Normal = 4.0-8.0 L/min  Terrance CI 2.32 L/min/m2 Normal = 2.5-4.0 L/min/m2  TD CO 3.57 L/min Normal = 4.0-8.0 L/min  TD CI 2.23 L/min/m2 Normal = 2.5-4.0  L/min/m2  PA sat 58%   Hgb 11 g/dL   PVR 10.8 Woods units  SVR 1056.1 dynes-sec/cm5   Right sided filling pressures are normal. Left sided filling pressures are normal. Severely elevated pulmonary artery hypertension. Left ventricular filling pressures are normal. Normal cardiac output level.     Echo 3/15/23  Interpretation Summary  Global and regional left ventricular function is normal with an EF of 55-60%.  RVEDv 104.8%.RVESv 73.1ml. RV EF 30.2%.  RVFAC 25.2%. RVLS s -8.9%. RVLSfw -16.2%.  Right ventricular systolic pressure is 43mmHg above the right atrial pressure.  IVC diameter <2.1 cm collapsing >50% with sniff suggests a normal RA pressure  of 3 mmHg.  RV size,function ,PA pressure and TR all improved.      NYHA Functional Class:  3      Video-Visit Details    Type of service:  Video Visit    Video Start Time: 1344  Video End Time: 1359    An additional 15 minutes was spent today performing chart and history review, pre and post visit documentation, patient education, and care coordination.      Originating Location (pt. Location): Home    Distant Location (provider location):  On-site    Platform used for Video Visit: Kobe Duke PA-C  [facility] Heart  Pager [phone]

## 2023-10-31 NOTE — PROGRESS NOTES
"      CARDIOLOGY PH CLINIC VIDEO VISIT    Date of video visit: 10/31/23      Patty Soliman is a 73 year old female who is being evaluated via a billable video visit.      Ht 1.575 m (5' 2\")   Wt 63.5 kg (140 lb)   BMI 25.61 kg/m        MEDICATIONS:  Current Outpatient Medications   Medication Sig Dispense Refill    aspirin 325 MG tablet [ASPIRIN 325 MG TABLET] daily.      atorvastatin (LIPITOR) 10 MG tablet [ATORVASTATIN (LIPITOR) 10 MG TABLET] daily.      cholecalciferol, vitamin D3, 1,000 unit tablet [CHOLECALCIFEROL, VITAMIN D3, 1,000 UNIT TABLET] Take 2,000 Units by mouth daily.      coenzyme Q10 200 mg capsule [COENZYME Q10 200 MG CAPSULE] Take 200 mg by mouth daily.      dapsone (ACZONE) 100 MG tablet TAKE 1 TABLET BY MOUTH EVERY DAY 90 tablet 1    dorzolamide-timolol (COSOPT) 22.3-6.8 mg/mL ophthalmic solution [DORZOLAMIDE-TIMOLOL (COSOPT) 22.3-6.8 MG/ML OPHTHALMIC SOLUTION] 1 gtt (Patient not taking: Reported on 8/29/2023)      fluticasone (FLOVENT HFA) 220 MCG/ACT inhaler Inhale 2 puffs into the lungs 2 times daily 12 g 11    furosemide (LASIX) 20 MG tablet Take 1 tablet (20 mg) by mouth daily 90 tablet 3    metFORMIN (GLUCOPHAGE) 500 MG tablet Take 1,000 mg by mouth daily (with dinner) HS      metoprolol tartrate (LOPRESSOR) 25 MG tablet [METOPROLOL TARTRATE (LOPRESSOR) 25 MG TABLET] Take 25 mg by mouth daily.       mycophenolate (GENERIC EQUIVALENT) 500 MG tablet Take 2 tablets (1,000 mg) by mouth 2 times daily 120 tablet 3    omeprazole (PRILOSEC) 20 MG capsule [OMEPRAZOLE (PRILOSEC) 20 MG CAPSULE] Take 20 mg by mouth daily before breakfast.       potassium chloride ER (KLOR-CON M) 20 MEQ CR tablet Take 2 tablets (40 mEq) by mouth 2 times daily 180 tablet 3    predniSONE (DELTASONE) 10 MG tablet Take 1 tablet (10 mg) by mouth daily 30 tablet 3    sildenafil (REVATIO) 20 MG tablet Take 1 tablet (20 mg) by mouth 3 times daily 90 tablet 11    Treprostinil 64 MCG POWD Inhale 64 mcg into the lungs 4 times " daily         Primary PH cardiologist: Dr. Sanchez        HPI:  Ms. Soliman is a pleasant 73 year old female with a PMHx including DM, HTN, dyslipidemia, CKD, and ILD. She also has pulmonary hypertension in the setting of her ILD, and is maintained on Tyvaso and sildenafil.    She was seen most recently in July at which she sounds to have been at her baseline and no changes were made.    Today, I am meeting back virtually with Patty. She tells me that things are about the same in regard to her breathing. However, she notes continued palpitations at times. She might feel a bit more SOB with this but denies dizziness or presyncope when this occurs. She is using her Lasix only PRN (which sounds like only a few times per month) as she has not had any swelling recently.       Most recent labs reviewed as below.      CURRENT PULMONARY HYPERTENSION REGIMEN:     PAH Rx: Tyvaso 64mcg DPI QID, sildenafil 20mg TID     Diuretics: Lasix 20mg PRN      Oxygen: 10L NC      Anticoagulation: None     Pulm: Dr. Rodriguez         Assessment/Plan:     1. Pulmonary hypertension.              --Ms. Soliman has moderate to severe PAH with RV dysfunction. She is on Tyvaso DPI 64mcg QID, along with sildenafil. Clinically, she sounds to be stable from a cardiopulmonary standpoint and last echo from March of this year showed improvement in RV function and degree of TR.               --As today is a virtual visit I cannot fully assess her volume status. However, she reports no recent edema and is using her Lasix only PRN. Renal function appears acceptable (though I note her MMF is being temporarily held per pulm). Will also add on NT-proBNP for trending purposes.               --Continue to follow in the ILD clinic. She is on immunosuppression and appears to be steroid dependent. She remains on supplemental oxygen as above as well.     2. Palpitations.   --She has a hx of SVT and is maintained on low dose metoprolol. It is unclear whether her  palpitations are more than usual. I offered a repeat Ziopatch, but asked her to call should these worsen.       Follow up plan: Return in 3 months to see Dr. Sanchez with repeat labs and 6MWT. I asked the patient to call sooner with any new concerns.         Testing/labs:    Most recent labs:      Latest Reference Range & Units 10/27/23 09:20   Sodium 135 - 145 mmol/L 141   Potassium 3.4 - 5.3 mmol/L 3.8   Chloride 98 - 107 mmol/L 101   Carbon Dioxide (CO2) 22 - 29 mmol/L 30 (H)   Urea Nitrogen 8.0 - 23.0 mg/dL 15.5   Creatinine 0.51 - 0.95 mg/dL 0.99 (H)   GFR Estimate >60 mL/min/1.73m2 59 (L)   Cystatin C 0.6 - 1.0 mg/L 1.5 (H)   GFR Calculated with Cystatin C >=60 mL/min/1.73m2 40 (L)   Calcium 8.8 - 10.2 mg/dL 9.6   Anion Gap 7 - 15 mmol/L 10   Albumin 3.5 - 5.2 g/dL 4.1   Protein Total 6.4 - 8.3 g/dL 6.4   Alkaline Phosphatase 35 - 104 U/L 183 (H)   ALT 0 - 50 U/L 27   AST 0 - 45 U/L 28   Bilirubin Total <=1.2 mg/dL 1.0   Glucose 70 - 99 mg/dL 194 (H)   WBC 4.0 - 11.0 10e3/uL 14.0 (H)   Hemoglobin 11.7 - 15.7 g/dL 12.1   Hematocrit 35.0 - 47.0 % 38.4   Platelet Count 150 - 450 10e3/uL 185   RBC Count 3.80 - 5.20 10e6/uL 3.85   MCV 78 - 100 fL 100   (H): Data is abnormally high  (L): Data is abnormally low      Other most recent pertinent testing:    [facility] 3/15/2023  Hemodynamics    Right Heart Catheterization:  /80/112 mmHg   BPM    RA 8/10/7 mmHg  RV 90/7 mmHg  PA 90/29/49 mmHg  PCW 11/11/9 mmHg  Terrance CO 3.71 L/min Normal = 4.0-8.0 L/min  Terrance CI 2.32 L/min/m2 Normal = 2.5-4.0 L/min/m2  TD CO 3.57 L/min Normal = 4.0-8.0 L/min  TD CI 2.23 L/min/m2 Normal = 2.5-4.0 L/min/m2  PA sat 58%   Hgb 11 g/dL   PVR 10.8 Woods units  SVR 1056.1 dynes-sec/cm5   Right sided filling pressures are normal. Left sided filling pressures are normal. Severely elevated pulmonary artery hypertension. Left ventricular filling pressures are normal. Normal cardiac output level.     Echo 3/15/23  Interpretation  Summary  Global and regional left ventricular function is normal with an EF of 55-60%.  RVEDv 104.8%.RVESv 73.1ml. RV EF 30.2%.  RVFAC 25.2%. RVLS s -8.9%. RVLSfw -16.2%.  Right ventricular systolic pressure is 43mmHg above the right atrial pressure.  IVC diameter <2.1 cm collapsing >50% with sniff suggests a normal RA pressure  of 3 mmHg.  RV size,function ,PA pressure and TR all improved.      NYHA Functional Class:  3      Video-Visit Details    Type of service:  Video Visit    Video Start Time: 1344  Video End Time: 1359    An additional 15 minutes was spent today performing chart and history review, pre and post visit documentation, patient education, and care coordination.      Originating Location (pt. Location): Home    Distant Location (provider location):  On-site    Platform used for Video Visit: Kobe Duke PA-C  [facility] Heart  Pager [phone]

## 2023-11-01 ENCOUNTER — TELEPHONE (OUTPATIENT)
Dept: PULMONOLOGY | Facility: CLINIC | Age: 73
End: 2023-11-01
Payer: MEDICARE

## 2023-11-01 NOTE — NURSING NOTE
"Orders placed and patient marked \"ready for checkout.\" Tiana Whitmore RN on 11/1/2023 at 8:27 AM    "

## 2023-11-01 NOTE — PATIENT INSTRUCTIONS
You were seen today in the Pulmonary Hypertension Clinic at the [facility].     Cardiology Provider you saw during your visit:    Janet Duke    Medication Changes:  None    Results:   Component      Latest Ref Rng 10/27/2023  9:20 AM   WBC      4.0 - 11.0 10e3/uL 14.0 (H)    RBC Count      3.80 - 5.20 10e6/uL 3.85    Hemoglobin      11.7 - 15.7 g/dL 12.1    Hematocrit      35.0 - 47.0 % 38.4    MCV      78 - 100 fL 100    MCH      26.5 - 33.0 pg 31.4    MCHC      31.5 - 36.5 g/dL 31.5    RDW      10.0 - 15.0 % 14.2    Platelet Count      150 - 450 10e3/uL 185    % Neutrophils      % 68    % Lymphocytes      % 18    % Monocytes      % 11    % Eosinophils      % 1    % Basophils      % 0    % Immature Granulocytes      % 2    NRBCs per 100 WBC      <1 /100 0    Absolute Neutrophils      1.6 - 8.3 10e3/uL 9.5 (H)    Absolute Lymphocytes      0.8 - 5.3 10e3/uL 2.5    Absolute Monocytes      0.0 - 1.3 10e3/uL 1.5 (H)    Absolute Eosinophils      0.0 - 0.7 10e3/uL 0.2    Absolute Basophils      0.0 - 0.2 10e3/uL 0.1    Absolute Immature Granulocytes      <=0.4 10e3/uL 0.2    Absolute NRBCs      10e3/uL 0.0    Sodium      135 - 145 mmol/L 141    Potassium      3.4 - 5.3 mmol/L 3.8    Carbon Dioxide (CO2)      22 - 29 mmol/L 30 (H)    Anion Gap      7 - 15 mmol/L 10    Urea Nitrogen      8.0 - 23.0 mg/dL 15.5    Creatinine      0.51 - 0.95 mg/dL 0.99 (H)    GFR Estimate      >60 mL/min/1.73m2 59 (L)    Calcium      8.8 - 10.2 mg/dL 9.6    Chloride      98 - 107 mmol/L 101    Glucose      70 - 99 mg/dL 194 (H)    Alkaline Phosphatase      35 - 104 U/L 183 (H)    AST      0 - 45 U/L 28    ALT      0 - 50 U/L 27    Protein Total      6.4 - 8.3 g/dL 6.4    Albumin      3.5 - 5.2 g/dL 4.1    Bilirubin Total      <=1.2 mg/dL 1.0    Cystatin C      0.6 - 1.0 mg/L 1.5 (H)    GFR Calculated with Cystatin C      >=60 mL/min/1.73m2 40 (L)    N-Terminal Pro Bnp      0 - 900 pg/mL 2,330 (H)       Legend:  (H) High  (L)  Low      Follow-up:   - Return to clinic in January to see Dr. Sanchez, with labs and 6 minute walk test prior    Please call us immediately if you have any syncope (fainting or passing out), chest pain, edema (swelling or weight gain), or decline in your functional status (general decline in how you are feeling).    If you have emergent concerns after hours or on the weekend, please call our on-call Cardiologist at [phone], option [phone]. For emergencies call [phone].     Thank you for allowing us to be a part of your care here at the [facility]    If you have questions or concerns please contact us at:    Kelli Hager RN (P: [phone])    Nurse Coordinator       Pulmonary Hypertension     [facility]         KENNETH Hernandez   (Prior Authorizations)    ()  Clinic   Clinic   Pulmonary Hypertension   Pulmonary Hypertension  [facility]  [facility]  (P)[phone]    (P) [phone]  (F) [phone]

## 2023-11-01 NOTE — TELEPHONE ENCOUNTER
Spoke to [facility] in [place], they did not receive CMP (10/27/23) lab order via fax. Staff message sent to Regino to fax to [phone]. Updated pt via phone.     Audra Richardson, RN, BSN  ILD Nurse   [phone]      ----- Message from Vandana Edward RN sent at 10/31/2023  4:02 PM CDT -----  Regarding: lab orders needed and to be sent  Pt left message that she needs lab orders sent. She is trying to make lab apt.     Esanx-[phone]

## 2023-11-01 NOTE — TELEPHONE ENCOUNTER
Spoke to [facility] in [place]. They have not received the lab order via fax that Regino faxed today. The previous person I talked to provided the medical records fax [phone] rather than the clinic fax. Staff message sent to Regino to re-fax to the clinic at [phone].    Audra Richardson RN, BSN  ILD Nurse   [phone]

## 2023-11-02 NOTE — TELEPHONE ENCOUNTER
Spoke to [facility] in [place]. They did receive the CMP lab order yesterday and are waiting for MD approval then they will call the pt to schedule a lab appt. Updated pt via phone, she will call Gonzalo if she does not hear from them by next week and will let us know date/time of her lab appt.     Audra Richardson, RN, BSN  ILD Nurse   [phone]

## 2023-11-11 DIAGNOSIS — J84.9 ILD (INTERSTITIAL LUNG DISEASE) (H): ICD-10-CM

## 2023-11-13 ENCOUNTER — LAB REQUISITION (OUTPATIENT)
Dept: LAB | Facility: CLINIC | Age: 73
End: 2023-11-13
Payer: MEDICARE

## 2023-11-13 DIAGNOSIS — I27.20 PULMONARY HYPERTENSION, UNSPECIFIED (H): ICD-10-CM

## 2023-11-13 LAB
ALBUMIN SERPL BCG-MCNC: 3.8 G/DL (ref 3.5–5.2)
ALP SERPL-CCNC: 113 U/L (ref 35–104)
ALT SERPL W P-5'-P-CCNC: 35 U/L (ref 0–50)
ANION GAP SERPL CALCULATED.3IONS-SCNC: 14 MMOL/L (ref 7–15)
AST SERPL W P-5'-P-CCNC: 31 U/L (ref 0–45)
BILIRUB SERPL-MCNC: 0.7 MG/DL
BUN SERPL-MCNC: 17.7 MG/DL (ref 8–23)
CALCIUM SERPL-MCNC: 9.6 MG/DL (ref 8.8–10.2)
CHLORIDE SERPL-SCNC: 102 MMOL/L (ref 98–107)
CREAT SERPL-MCNC: 0.99 MG/DL (ref 0.51–0.95)
DEPRECATED HCO3 PLAS-SCNC: 24 MMOL/L (ref 22–29)
EGFRCR SERPLBLD CKD-EPI 2021: 59 ML/MIN/1.73M2
GLUCOSE SERPL-MCNC: 245 MG/DL (ref 70–99)
POTASSIUM SERPL-SCNC: 3.7 MMOL/L (ref 3.4–5.3)
PROT SERPL-MCNC: 6.1 G/DL (ref 6.4–8.3)
SODIUM SERPL-SCNC: 140 MMOL/L (ref 135–145)

## 2023-11-13 PROCEDURE — 80053 COMPREHEN METABOLIC PANEL: CPT | Mod: ORL | Performed by: FAMILY MEDICINE

## 2023-11-13 RX ORDER — MYCOPHENOLATE MOFETIL 500 MG/1
1000 TABLET ORAL 2 TIMES DAILY
Qty: 360 TABLET | Refills: 1 | Status: SHIPPED | OUTPATIENT
Start: 2023-11-13 | End: 2024-03-22

## 2023-11-14 ENCOUNTER — TELEPHONE (OUTPATIENT)
Dept: CARDIOLOGY | Facility: CLINIC | Age: 73
End: 2023-11-14
Payer: MEDICARE

## 2023-11-14 NOTE — TELEPHONE ENCOUNTER
11/14 [facility] to schedule 6mwt, labs and follow up with Daniel eric sent - pm

## 2023-11-16 DIAGNOSIS — C50.411 MALIGNANT NEOPLASM OF UPPER-OUTER QUADRANT OF RIGHT BREAST IN FEMALE, ESTROGEN RECEPTOR POSITIVE (H): Primary | ICD-10-CM

## 2023-11-16 DIAGNOSIS — Z17.0 MALIGNANT NEOPLASM OF UPPER-OUTER QUADRANT OF RIGHT BREAST IN FEMALE, ESTROGEN RECEPTOR POSITIVE (H): Primary | ICD-10-CM

## 2023-11-16 RX ORDER — MORPHINE SULFATE 10 MG/5ML
2 SOLUTION ORAL 3 TIMES DAILY PRN
Qty: 90 ML | Refills: 0 | Status: SHIPPED | OUTPATIENT
Start: 2023-11-16 | End: 2024-04-23

## 2023-11-16 NOTE — TELEPHONE ENCOUNTER
Received MyChart message from pt requesting an order for morphine. This was discussed during her visit with Dr. Frances in August and she's ready to try it.    Last office visit: 8/29/23  Scheduled for follow up 1/30/24     Will route request to MD for review.

## 2023-11-17 ENCOUNTER — MYC MEDICAL ADVICE (OUTPATIENT)
Dept: PULMONOLOGY | Facility: CLINIC | Age: 73
End: 2023-11-17
Payer: MEDICARE

## 2023-11-17 DIAGNOSIS — J84.9 ILD (INTERSTITIAL LUNG DISEASE) (H): Primary | ICD-10-CM

## 2023-12-06 ENCOUNTER — TELEPHONE (OUTPATIENT)
Dept: PULMONOLOGY | Facility: CLINIC | Age: 73
End: 2023-12-06
Payer: MEDICARE

## 2023-12-06 NOTE — TELEPHONE ENCOUNTER
Patient Contacted for the patient to call back and schedule the following:    Appointment type: [facility]  Provider: kathy  Return date: 03/22/24  Specialty phone number: [phone]  Additional appointment(s) needed: fpft  Additonal Notes: n/a

## 2023-12-19 ENCOUNTER — MYC MEDICAL ADVICE (OUTPATIENT)
Dept: PULMONOLOGY | Facility: CLINIC | Age: 73
End: 2023-12-19
Payer: MEDICARE

## 2023-12-19 DIAGNOSIS — J84.9 ILD (INTERSTITIAL LUNG DISEASE) (H): Primary | ICD-10-CM

## 2023-12-20 NOTE — TELEPHONE ENCOUNTER
New standing orders placed for lab monitoring  Orders faxed to patients preferred lab    Sabi Sams RN

## 2023-12-23 ENCOUNTER — HEALTH MAINTENANCE LETTER (OUTPATIENT)
Age: 73
End: 2023-12-23

## 2023-12-27 ENCOUNTER — LAB (OUTPATIENT)
Dept: LAB | Facility: CLINIC | Age: 73
End: 2023-12-27
Payer: MEDICARE

## 2023-12-27 DIAGNOSIS — J84.9 ILD (INTERSTITIAL LUNG DISEASE) (H): ICD-10-CM

## 2023-12-27 LAB
ALBUMIN SERPL BCG-MCNC: 4.3 G/DL (ref 3.5–5.2)
ALP SERPL-CCNC: 102 U/L (ref 40–150)
ALT SERPL W P-5'-P-CCNC: 26 U/L (ref 0–50)
ANION GAP SERPL CALCULATED.3IONS-SCNC: 16 MMOL/L (ref 7–15)
AST SERPL W P-5'-P-CCNC: 26 U/L (ref 0–45)
BASOPHILS # BLD AUTO: 0 10E3/UL (ref 0–0.2)
BASOPHILS NFR BLD AUTO: 0 %
BILIRUB SERPL-MCNC: 1 MG/DL
BUN SERPL-MCNC: 22.2 MG/DL (ref 8–23)
CALCIUM SERPL-MCNC: 9.6 MG/DL (ref 8.8–10.2)
CHLORIDE SERPL-SCNC: 97 MMOL/L (ref 98–107)
CREAT SERPL-MCNC: 1 MG/DL (ref 0.51–0.95)
DEPRECATED HCO3 PLAS-SCNC: 26 MMOL/L (ref 22–29)
EGFRCR SERPLBLD CKD-EPI 2021: 58 ML/MIN/1.73M2
EOSINOPHIL # BLD AUTO: 0 10E3/UL (ref 0–0.7)
EOSINOPHIL NFR BLD AUTO: 0 %
ERYTHROCYTE [DISTWIDTH] IN BLOOD BY AUTOMATED COUNT: 13.8 % (ref 10–15)
GLUCOSE SERPL-MCNC: 311 MG/DL (ref 70–99)
HCT VFR BLD AUTO: 39.2 % (ref 35–47)
HGB BLD-MCNC: 12.7 G/DL (ref 11.7–15.7)
IMM GRANULOCYTES # BLD: 0.1 10E3/UL
IMM GRANULOCYTES NFR BLD: 1 %
LYMPHOCYTES # BLD AUTO: 1.5 10E3/UL (ref 0.8–5.3)
LYMPHOCYTES NFR BLD AUTO: 10 %
MCH RBC QN AUTO: 32.2 PG (ref 26.5–33)
MCHC RBC AUTO-ENTMCNC: 32.4 G/DL (ref 31.5–36.5)
MCV RBC AUTO: 99 FL (ref 78–100)
MONOCYTES # BLD AUTO: 0.8 10E3/UL (ref 0–1.3)
MONOCYTES NFR BLD AUTO: 5 %
NEUTROPHILS # BLD AUTO: 12.5 10E3/UL (ref 1.6–8.3)
NEUTROPHILS NFR BLD AUTO: 84 %
PLATELET # BLD AUTO: 220 10E3/UL (ref 150–450)
POTASSIUM SERPL-SCNC: 4.2 MMOL/L (ref 3.4–5.3)
PROT SERPL-MCNC: 6.7 G/DL (ref 6.4–8.3)
RBC # BLD AUTO: 3.95 10E6/UL (ref 3.8–5.2)
SODIUM SERPL-SCNC: 139 MMOL/L (ref 135–145)
WBC # BLD AUTO: 15 10E3/UL (ref 4–11)

## 2023-12-27 PROCEDURE — 85025 COMPLETE CBC W/AUTO DIFF WBC: CPT

## 2023-12-27 PROCEDURE — 36415 COLL VENOUS BLD VENIPUNCTURE: CPT

## 2023-12-27 PROCEDURE — 80053 COMPREHEN METABOLIC PANEL: CPT

## 2024-01-19 ENCOUNTER — OFFICE VISIT (OUTPATIENT)
Dept: PULMONOLOGY | Facility: CLINIC | Age: 74
End: 2024-01-19
Attending: PHYSICIAN ASSISTANT
Payer: MEDICARE

## 2024-01-19 ENCOUNTER — OFFICE VISIT (OUTPATIENT)
Dept: CARDIOLOGY | Facility: CLINIC | Age: 74
End: 2024-01-19
Attending: PHYSICIAN ASSISTANT
Payer: MEDICARE

## 2024-01-19 ENCOUNTER — LAB (OUTPATIENT)
Dept: LAB | Facility: CLINIC | Age: 74
End: 2024-01-19
Payer: MEDICARE

## 2024-01-19 VITALS
HEART RATE: 99 BPM | DIASTOLIC BLOOD PRESSURE: 71 MMHG | BODY MASS INDEX: 26.16 KG/M2 | OXYGEN SATURATION: 97 % | SYSTOLIC BLOOD PRESSURE: 135 MMHG | WEIGHT: 143 LBS

## 2024-01-19 DIAGNOSIS — I27.20 PULMONARY HTN (H): ICD-10-CM

## 2024-01-19 DIAGNOSIS — R06.09 DOE (DYSPNEA ON EXERTION): ICD-10-CM

## 2024-01-19 DIAGNOSIS — J84.9 ILD (INTERSTITIAL LUNG DISEASE) (H): ICD-10-CM

## 2024-01-19 DIAGNOSIS — I50.810 RIGHT HEART FAILURE, NYHA CLASS 3 (H): Primary | ICD-10-CM

## 2024-01-19 LAB
6 MIN WALK (FT): 720 FT
6 MIN WALK (M): 219 M
ALBUMIN SERPL BCG-MCNC: 4.2 G/DL (ref 3.5–5.2)
ALP SERPL-CCNC: 107 U/L (ref 40–150)
ALT SERPL W P-5'-P-CCNC: 24 U/L (ref 0–50)
ANION GAP SERPL CALCULATED.3IONS-SCNC: 11 MMOL/L (ref 7–15)
AST SERPL W P-5'-P-CCNC: 22 U/L (ref 0–45)
BASOPHILS # BLD AUTO: 0.1 10E3/UL (ref 0–0.2)
BASOPHILS NFR BLD AUTO: 0 %
BILIRUB SERPL-MCNC: 0.9 MG/DL
BUN SERPL-MCNC: 16.7 MG/DL (ref 8–23)
CALCIUM SERPL-MCNC: 9.9 MG/DL (ref 8.8–10.2)
CHLORIDE SERPL-SCNC: 98 MMOL/L (ref 98–107)
CREAT SERPL-MCNC: 0.93 MG/DL (ref 0.51–0.95)
DEPRECATED HCO3 PLAS-SCNC: 31 MMOL/L (ref 22–29)
EGFRCR SERPLBLD CKD-EPI 2021: 63 ML/MIN/1.73M2
EOSINOPHIL # BLD AUTO: 0.2 10E3/UL (ref 0–0.7)
EOSINOPHIL NFR BLD AUTO: 1 %
ERYTHROCYTE [DISTWIDTH] IN BLOOD BY AUTOMATED COUNT: 13.2 % (ref 10–15)
GLUCOSE SERPL-MCNC: 202 MG/DL (ref 70–99)
HCT VFR BLD AUTO: 40.4 % (ref 35–47)
HGB BLD-MCNC: 13.1 G/DL (ref 11.7–15.7)
IMM GRANULOCYTES # BLD: 0.2 10E3/UL
IMM GRANULOCYTES NFR BLD: 1 %
LYMPHOCYTES # BLD AUTO: 1.8 10E3/UL (ref 0.8–5.3)
LYMPHOCYTES NFR BLD AUTO: 12 %
MCH RBC QN AUTO: 31.2 PG (ref 26.5–33)
MCHC RBC AUTO-ENTMCNC: 32.4 G/DL (ref 31.5–36.5)
MCV RBC AUTO: 96 FL (ref 78–100)
MONOCYTES # BLD AUTO: 1.2 10E3/UL (ref 0–1.3)
MONOCYTES NFR BLD AUTO: 8 %
NEUTROPHILS # BLD AUTO: 11.4 10E3/UL (ref 1.6–8.3)
NEUTROPHILS NFR BLD AUTO: 78 %
NRBC # BLD AUTO: 0 10E3/UL
NRBC BLD AUTO-RTO: 0 /100
PLATELET # BLD AUTO: 240 10E3/UL (ref 150–450)
POTASSIUM SERPL-SCNC: 3.9 MMOL/L (ref 3.4–5.3)
PROT SERPL-MCNC: 6.7 G/DL (ref 6.4–8.3)
RBC # BLD AUTO: 4.2 10E6/UL (ref 3.8–5.2)
SODIUM SERPL-SCNC: 140 MMOL/L (ref 135–145)
WBC # BLD AUTO: 14.7 10E3/UL (ref 4–11)

## 2024-01-19 PROCEDURE — 85025 COMPLETE CBC W/AUTO DIFF WBC: CPT | Performed by: PATHOLOGY

## 2024-01-19 PROCEDURE — 80053 COMPREHEN METABOLIC PANEL: CPT | Performed by: PATHOLOGY

## 2024-01-19 PROCEDURE — 36415 COLL VENOUS BLD VENIPUNCTURE: CPT | Performed by: PATHOLOGY

## 2024-01-19 PROCEDURE — G0463 HOSPITAL OUTPT CLINIC VISIT: HCPCS | Performed by: INTERNAL MEDICINE

## 2024-01-19 PROCEDURE — 94618 PULMONARY STRESS TESTING: CPT | Performed by: INTERNAL MEDICINE

## 2024-01-19 PROCEDURE — 99215 OFFICE O/P EST HI 40 MIN: CPT | Performed by: INTERNAL MEDICINE

## 2024-01-19 ASSESSMENT — PAIN SCALES - GENERAL: PAINLEVEL: NO PAIN (0)

## 2024-01-19 NOTE — NURSING NOTE
Chief Complaint   Patient presents with    Follow Up      3MO follow-up     Vitals were taken and medications reconciled.    Hilary Vidal CNA  11:27 AM

## 2024-01-19 NOTE — PROGRESS NOTES
Patty Soliman comes into clinic today at the request of Dr. Daniel Sanchez Ordering Provider for 6 minute walk    Hugo Luo , RRT

## 2024-01-19 NOTE — PROGRESS NOTES
January 19, 2024    Dear Dr. Wise,    We had the pleasure of seeing Ms. Soliman in our pulmonary hypertension clinic at [facility].  As you know, she is a 74-year-old female with pulmonary hypertension secondary to interstitial lung disease, likely hypersensitivity pneumonitis.  She is currently on inhaled treprostinil DPI 64 mcg 4 times a day and sildenafil 20 mg 3 times a day.  She returns today for follow-up.    Overall, she is doing well and stable.  She requires 8 to 10 L at rest and sometimes up to 15 L during exertion to maintain her saturation.  She is able to do her activities of daily living slowly.  She lives independently.  I would currently characterize as functional class III.  She has not had any exertional chest pain or chest pressure.  No exertional presyncope or syncope.  She has not had any worsening lower extremity swelling.  She takes her furosemide only every other day.  No recent hospitalization or ER visit.  She is compliant with her inhaled treprostinil DPI 4 times a day and sildenafil 20 mg 3 times a day.  She is tolerating this without any side effects.    She is being followed by Dr. Rodriguez with the ILD clinic.  She has hypersensitive pneumonitis for which she is on tapering dose of prednisone and mycophenolate.    PMH:  1.  Type 2 diabetes mellitus  2.  Hypertension  3.  Interstitial lung disease  4.  Pulmonary hypertension secondary to interstitial lung disease    Current medication   Current Outpatient Medications   Medication Sig    aspirin 325 MG tablet [ASPIRIN 325 MG TABLET] daily.    atorvastatin (LIPITOR) 10 MG tablet [ATORVASTATIN (LIPITOR) 10 MG TABLET] daily.    cholecalciferol, vitamin D3, 1,000 unit tablet [CHOLECALCIFEROL, VITAMIN D3, 1,000 UNIT TABLET] Take 2,000 Units by mouth daily.    coenzyme Q10 200 mg capsule [COENZYME Q10 200 MG CAPSULE] Take 200 mg by mouth daily.    dapsone (ACZONE) 100 MG tablet TAKE 1 TABLET BY MOUTH EVERY DAY     fluticasone (FLOVENT HFA) 220 MCG/ACT inhaler Inhale 2 puffs into the lungs 2 times daily    furosemide (LASIX) 20 MG tablet Take 20 mg by mouth daily    metFORMIN (GLUCOPHAGE) 500 MG tablet Take 1,000 mg by mouth daily (with dinner) HS    metoprolol tartrate (LOPRESSOR) 25 MG tablet [METOPROLOL TARTRATE (LOPRESSOR) 25 MG TABLET] Take 25 mg by mouth daily.     morphine 10 MG/5ML solution Take 1 mL (2 mg) by mouth 3 times daily as needed (Dyspnea)    mycophenolate (GENERIC EQUIVALENT) 500 MG tablet TAKE 2 TABLETS BY MOUTH 2 TIMES DAILY    omeprazole (PRILOSEC) 20 MG capsule [OMEPRAZOLE (PRILOSEC) 20 MG CAPSULE] Take 20 mg by mouth daily before breakfast.     potassium chloride ER (KLOR-CON M) 20 MEQ CR tablet Take 2 tablets (40 mEq) by mouth 2 times daily (Patient taking differently: Take 20 mEq by mouth daily)    predniSONE (DELTASONE) 10 MG tablet Take 1 tablet (10 mg) by mouth daily    sildenafil (REVATIO) 20 MG tablet Take 1 tablet (20 mg) by mouth 3 times daily    Treprostinil 64 MCG POWD Inhale 64 mcg into the lungs 4 times daily     No current facility-administered medications for this visit.     ROS:   Constitutional: No fever, chills, or sweats. No weight gain/loss  ENT: No visual disturbance, ear ache, epistaxis, sore throat  Allergies/Immunologic: Negative   Respiratory: She does have a dry cough on and off.  Cardiovascular: As per HPI  GI: No nausea, vomiting, hematemesis, melena, or hematochezia   : No urinary frequency, dysuria, or hematuria  Integument: Negative  Psychiatric: Negative   Neuro: Negative  Endocrinology: Negative   Musculoskeletal: Negative    EXAM:   /71 (BP Location: Right arm, Patient Position: Sitting, Cuff Size: Adult Regular)   Pulse 99   Wt 64.9 kg (143 lb)   SpO2 97%   BMI 26.16 kg/m    She was awake, alert, oriented x3.  She was comfortable.  She was in no apparent distress.  She had no pallor, cyanosis or jaundice.  Neck exam revealed no jugular venous distention.   Her carotids were 1+ bilaterally.  Pulse was regular and rhythm.  Cardiac auscultation revealed normal S1 loud P2 no murmur rub or gallop.  Auscultation of the lungs revealed normal vesicular sounds bilaterally and bilateral end inspiratory crepitations.  Her abdomen was soft with normal also no tenderness no rigidity no guarding.  She had no focal neurological deficit.  Her extremities showed no edema.    Labs:    Recent Results (from the past 168 hour(s))   6 minute walk test    Collection Time: 01/19/24 12:00 AM   Result Value Ref Range    6 min walk (FT) 720 1,076 ft    6 Min Walk (M) 219 328 m   Comprehensive metabolic panel    Collection Time: 01/19/24  9:52 AM   Result Value Ref Range    Sodium 140 135 - 145 mmol/L    Potassium 3.9 3.4 - 5.3 mmol/L    Carbon Dioxide (CO2) 31 (H) 22 - 29 mmol/L    Anion Gap 11 7 - 15 mmol/L    Urea Nitrogen 16.7 8.0 - 23.0 mg/dL    Creatinine 0.93 0.51 - 0.95 mg/dL    GFR Estimate 63 >60 mL/min/1.73m2    Calcium 9.9 8.8 - 10.2 mg/dL    Chloride 98 98 - 107 mmol/L    Glucose 202 (H) 70 - 99 mg/dL    Alkaline Phosphatase 107 40 - 150 U/L    AST 22 0 - 45 U/L    ALT 24 0 - 50 U/L    Protein Total 6.7 6.4 - 8.3 g/dL    Albumin 4.2 3.5 - 5.2 g/dL    Bilirubin Total 0.9 <=1.2 mg/dL   CBC with platelets and differential    Collection Time: 01/19/24  9:52 AM   Result Value Ref Range    WBC Count 14.7 (H) 4.0 - 11.0 10e3/uL    RBC Count 4.20 3.80 - 5.20 10e6/uL    Hemoglobin 13.1 11.7 - 15.7 g/dL    Hematocrit 40.4 35.0 - 47.0 %    MCV 96 78 - 100 fL    MCH 31.2 26.5 - 33.0 pg    MCHC 32.4 31.5 - 36.5 g/dL    RDW 13.2 10.0 - 15.0 %    Platelet Count 240 150 - 450 10e3/uL    % Neutrophils 78 %    % Lymphocytes 12 %    % Monocytes 8 %    % Eosinophils 1 %    % Basophils 0 %    % Immature Granulocytes 1 %    NRBCs per 100 WBC 0 <1 /100    Absolute Neutrophils 11.4 (H) 1.6 - 8.3 10e3/uL    Absolute Lymphocytes 1.8 0.8 - 5.3 10e3/uL    Absolute Monocytes 1.2 0.0 - 1.3 10e3/uL    Absolute  Eosinophils 0.2 0.0 - 0.7 10e3/uL    Absolute Basophils 0.1 0.0 - 0.2 10e3/uL    Absolute Immature Granulocytes 0.2 <=0.4 10e3/uL    Absolute NRBCs 0.0 10e3/uL   General PFT Lab (Please always keep checked)    Collection Time: 01/19/24 11:13 AM   Result Value Ref Range    FIO2-Pre 60.00 %        Echocardiogram (03/2023)  Global and regional left ventricular function is normal with an EF of 55-60%.  RVEDv 104.8%.RVESv 73.1ml. RV EF 30.2%.  RVFAC 25.2%. RVLS s -8.9%. RVLSfw -16.2%.  Right ventricular systolic pressure is 43mmHg above the right atrial pressure.  IVC diameter <2.1 cm collapsing >50% with sniff suggests a normal RA pressure  of 3 mmHg.  RV size,function ,PA pressure and TR all improved.    RHC (03/2023)  Right Heart Catheterization:  /80/112 mmHg   BPM    RA 8/10/7 mmHg  RV 90/7 mmHg  PA 90/29 (49) mmHg  PCW 11/11/9 mmHg  Terrance CO 3.71 L/min Normal = 4.0-8.0 L/min  Terrance CI 2.32 L/min/m2 Normal = 2.5-4.0 L/min/m2  TD CO 3.57 L/min Normal = 4.0-8.0 L/min  TD CI 2.23 L/min/m2 Normal = 2.5-4.0 L/min/m2  PA sat 58%   Hgb 11 g/dL   PVR 10.8 Woods units  SVR 1056.1 dynes-sec/cm5          Latest Ref Rng & Units 10/27/2023     9:27 AM   PFT   FVC L 1.74    FEV1 L 1.54    FVC% % 76    FEV1% % 86        6MWT (11/2022)  On her 6-minute walk test she walked 219 meters, which is better than before.  She desaturated to 77% on 10 liters and have required 15 L of supplemental oxygen to maintain a saturation of 85% in the past.    Assessment and Plan:   In summary, Ms. Soliman is a very delightful 74-year-old female with pulmonary hypertension due to interstitial lung disease who returns today for follow-up.  She is currently on inhaled treprostinil DPI 64 mcg 4 times a day and sildenafil 20 mg 3 times a day.    1. Pulmonary arterial hypertension out of proportion to interstitial lung disease:     She has been reasonably stable in the last 3 months.  She continues to remain functional class III weight with a very  high oxygen requirement but has not had any further progression.  Her NT proBNP is elevated but significantly better when compared to last year.  Her repeat heart catheterization showed improvement in her mean PA pressure, PVR and cardiac index.  Her repeat echocardiogram also showed improvement in her right ventricular size and function.    She is on maximum tolerated therapy for pulm hypertension secondary to interstitial lung disease.  I have recommended her to continue inhaled treprostinil 64 mcg DPI 4 times a day, sildenafil 20 mg 3 times a day, and furosemide 20 mg every other day, and supplemental oxygen 8 to 10 L at rest and 10 to 15 L with exertion.    2.  Interstitial lung disease - This is managed by Dr. Rodriguez.  She is on tapering dose of prednisone and mycophenolate.    3. SVT - on low dose metoprolol.    I have recommended her to return to see my colleague Janet Duke in 3 months and 6 months with me with labs and 6MWT.  Will plan to repeat an echocardiogram this summer when she returns to see us.  Will decide on right heart catheterization based on her clinical course as this was normal to change her treatment significantly    It was a pleasure meeting Ms. Soliman in our pulmonary hypertension clinic continues to [facility].  We thank you for involving us in her care.    Total time today was 45 minutes reviewing notes, imaging, labs, patient visit, orders and documentation       Sincerely,  Daniel Sanchez MD   [facility]  Heart Failure, Transplant, and Mechanical Circulatory Support Cardiology   Cardiovascular Division  [facility]   [phone]

## 2024-01-19 NOTE — LETTER
1/19/2024      RE: Patty Soliman  [address]  [facility] [id number]       Dear Colleague,    Thank you for the opportunity to participate in the care of your patient, Patty Soliman, at the [facility] [place] at [facility]. Please see a copy of my visit note below.    January 19, 2024    Dear Dr. Wise,    We had the pleasure of seeing Ms. Soliman in our pulmonary hypertension clinic at [facility].  As you know, she is a 74-year-old female with pulmonary hypertension secondary to interstitial lung disease, likely hypersensitivity pneumonitis.  She is currently on inhaled treprostinil DPI 64 mcg 4 times a day and sildenafil 20 mg 3 times a day.  She returns today for follow-up.    Overall, she is doing well and stable.  She requires 8 to 10 L at rest and sometimes up to 15 L during exertion to maintain her saturation.  She is able to do her activities of daily living slowly.  She lives independently.  I would currently characterize as functional class III.  She has not had any exertional chest pain or chest pressure.  No exertional presyncope or syncope.  She has not had any worsening lower extremity swelling.  She takes her furosemide only every other day.  No recent hospitalization or ER visit.  She is compliant with her inhaled treprostinil DPI 4 times a day and sildenafil 20 mg 3 times a day.  She is tolerating this without any side effects.    She is being followed by Dr. Rodriguez with the ILD clinic.  She has hypersensitive pneumonitis for which she is on tapering dose of prednisone and mycophenolate.    PMH:  1.  Type 2 diabetes mellitus  2.  Hypertension  3.  Interstitial lung disease  4.  Pulmonary hypertension secondary to interstitial lung disease    Current medication   Current Outpatient Medications   Medication Sig    aspirin 325 MG tablet [ASPIRIN 325 MG TABLET] daily.    atorvastatin (LIPITOR) 10 MG tablet  [ATORVASTATIN (LIPITOR) 10 MG TABLET] daily.    cholecalciferol, vitamin D3, 1,000 unit tablet [CHOLECALCIFEROL, VITAMIN D3, 1,000 UNIT TABLET] Take 2,000 Units by mouth daily.    coenzyme Q10 200 mg capsule [COENZYME Q10 200 MG CAPSULE] Take 200 mg by mouth daily.    dapsone (ACZONE) 100 MG tablet TAKE 1 TABLET BY MOUTH EVERY DAY    fluticasone (FLOVENT HFA) 220 MCG/ACT inhaler Inhale 2 puffs into the lungs 2 times daily    furosemide (LASIX) 20 MG tablet Take 20 mg by mouth daily    metFORMIN (GLUCOPHAGE) 500 MG tablet Take 1,000 mg by mouth daily (with dinner) HS    metoprolol tartrate (LOPRESSOR) 25 MG tablet [METOPROLOL TARTRATE (LOPRESSOR) 25 MG TABLET] Take 25 mg by mouth daily.     morphine 10 MG/5ML solution Take 1 mL (2 mg) by mouth 3 times daily as needed (Dyspnea)    mycophenolate (GENERIC EQUIVALENT) 500 MG tablet TAKE 2 TABLETS BY MOUTH 2 TIMES DAILY    omeprazole (PRILOSEC) 20 MG capsule [OMEPRAZOLE (PRILOSEC) 20 MG CAPSULE] Take 20 mg by mouth daily before breakfast.     potassium chloride ER (KLOR-CON M) 20 MEQ CR tablet Take 2 tablets (40 mEq) by mouth 2 times daily (Patient taking differently: Take 20 mEq by mouth daily)    predniSONE (DELTASONE) 10 MG tablet Take 1 tablet (10 mg) by mouth daily    sildenafil (REVATIO) 20 MG tablet Take 1 tablet (20 mg) by mouth 3 times daily    Treprostinil 64 MCG POWD Inhale 64 mcg into the lungs 4 times daily     No current facility-administered medications for this visit.     ROS:   Constitutional: No fever, chills, or sweats. No weight gain/loss  ENT: No visual disturbance, ear ache, epistaxis, sore throat  Allergies/Immunologic: Negative   Respiratory: She does have a dry cough on and off.  Cardiovascular: As per HPI  GI: No nausea, vomiting, hematemesis, melena, or hematochezia   : No urinary frequency, dysuria, or hematuria  Integument: Negative  Psychiatric: Negative   Neuro: Negative  Endocrinology: Negative   Musculoskeletal: Negative    EXAM:   BP  135/71 (BP Location: Right arm, Patient Position: Sitting, Cuff Size: Adult Regular)   Pulse 99   Wt 64.9 kg (143 lb)   SpO2 97%   BMI 26.16 kg/m    She was awake, alert, oriented x3.  She was comfortable.  She was in no apparent distress.  She had no pallor, cyanosis or jaundice.  Neck exam revealed no jugular venous distention.  Her carotids were 1+ bilaterally.  Pulse was regular and rhythm.  Cardiac auscultation revealed normal S1 loud P2 no murmur rub or gallop.  Auscultation of the lungs revealed normal vesicular sounds bilaterally and bilateral end inspiratory crepitations.  Her abdomen was soft with normal also no tenderness no rigidity no guarding.  She had no focal neurological deficit.  Her extremities showed no edema.    Labs:    Recent Results (from the past 168 hour(s))   6 minute walk test    Collection Time: 01/19/24 12:00 AM   Result Value Ref Range    6 min walk (FT) 720 1,076 ft    6 Min Walk (M) 219 328 m   Comprehensive metabolic panel    Collection Time: 01/19/24  9:52 AM   Result Value Ref Range    Sodium 140 135 - 145 mmol/L    Potassium 3.9 3.4 - 5.3 mmol/L    Carbon Dioxide (CO2) 31 (H) 22 - 29 mmol/L    Anion Gap 11 7 - 15 mmol/L    Urea Nitrogen 16.7 8.0 - 23.0 mg/dL    Creatinine 0.93 0.51 - 0.95 mg/dL    GFR Estimate 63 >60 mL/min/1.73m2    Calcium 9.9 8.8 - 10.2 mg/dL    Chloride 98 98 - 107 mmol/L    Glucose 202 (H) 70 - 99 mg/dL    Alkaline Phosphatase 107 40 - 150 U/L    AST 22 0 - 45 U/L    ALT 24 0 - 50 U/L    Protein Total 6.7 6.4 - 8.3 g/dL    Albumin 4.2 3.5 - 5.2 g/dL    Bilirubin Total 0.9 <=1.2 mg/dL   CBC with platelets and differential    Collection Time: 01/19/24  9:52 AM   Result Value Ref Range    WBC Count 14.7 (H) 4.0 - 11.0 10e3/uL    RBC Count 4.20 3.80 - 5.20 10e6/uL    Hemoglobin 13.1 11.7 - 15.7 g/dL    Hematocrit 40.4 35.0 - 47.0 %    MCV 96 78 - 100 fL    MCH 31.2 26.5 - 33.0 pg    MCHC 32.4 31.5 - 36.5 g/dL    RDW 13.2 10.0 - 15.0 %    Platelet Count 240  150 - 450 10e3/uL    % Neutrophils 78 %    % Lymphocytes 12 %    % Monocytes 8 %    % Eosinophils 1 %    % Basophils 0 %    % Immature Granulocytes 1 %    NRBCs per 100 WBC 0 <1 /100    Absolute Neutrophils 11.4 (H) 1.6 - 8.3 10e3/uL    Absolute Lymphocytes 1.8 0.8 - 5.3 10e3/uL    Absolute Monocytes 1.2 0.0 - 1.3 10e3/uL    Absolute Eosinophils 0.2 0.0 - 0.7 10e3/uL    Absolute Basophils 0.1 0.0 - 0.2 10e3/uL    Absolute Immature Granulocytes 0.2 <=0.4 10e3/uL    Absolute NRBCs 0.0 10e3/uL   General PFT Lab (Please always keep checked)    Collection Time: 01/19/24 11:13 AM   Result Value Ref Range    FIO2-Pre 60.00 %        Echocardiogram (03/2023)  Global and regional left ventricular function is normal with an EF of 55-60%.  RVEDv 104.8%.RVESv 73.1ml. RV EF 30.2%.  RVFAC 25.2%. RVLS s -8.9%. RVLSfw -16.2%.  Right ventricular systolic pressure is 43mmHg above the right atrial pressure.  IVC diameter <2.1 cm collapsing >50% with sniff suggests a normal RA pressure  of 3 mmHg.  RV size,function ,PA pressure and TR all improved.    [facility] (03/2023)  Right Heart Catheterization:  /80/112 mmHg   BPM    RA 8/10/7 mmHg  RV 90/7 mmHg  PA 90/29 (49) mmHg  PCW 11/11/9 mmHg  [place]ance CO 3.71 L/min Normal = 4.0-8.0 L/min  [place] CI 2.32 L/min/m2 Normal = 2.5-4.0 L/min/m2  TD CO 3.57 L/min Normal = 4.0-8.0 L/min  TD CI 2.23 L/min/m2 Normal = 2.5-4.0 L/min/m2  PA sat 58%   Hgb 11 g/dL   PVR 10.8 Woods units  SVR 1056.1 dynes-sec/cm5          Latest Ref Rng & Units 10/27/2023     9:27 AM   PFT   FVC L 1.74    FEV1 L 1.54    FVC% % 76    FEV1% % 86        6MWT (11/2022)  On her 6-minute walk test she walked 219 meters, which is better than before.  She desaturated to 77% on 10 liters and have required 15 L of supplemental oxygen to maintain a saturation of 85% in the past.    Assessment and Plan:   In summary, Ms. Soliman is a very delightful 74-year-old female with pulmonary hypertension due to interstitial lung disease who  returns today for follow-up.  She is currently on inhaled treprostinil DPI 64 mcg 4 times a day and sildenafil 20 mg 3 times a day.    1. Pulmonary arterial hypertension out of proportion to interstitial lung disease:     She has been reasonably stable in the last 3 months.  She continues to remain functional class III weight with a very high oxygen requirement but has not had any further progression.  Her NT proBNP is elevated but significantly better when compared to last year.  Her repeat heart catheterization showed improvement in her mean PA pressure, PVR and cardiac index.  Her repeat echocardiogram also showed improvement in her right ventricular size and function.    She is on maximum tolerated therapy for pulm hypertension secondary to interstitial lung disease.  I have recommended her to continue inhaled treprostinil 64 mcg DPI 4 times a day, sildenafil 20 mg 3 times a day, and furosemide 20 mg every other day, and supplemental oxygen 8 to 10 L at rest and 10 to 15 L with exertion.    2.  Interstitial lung disease - This is managed by Dr. Rodriguez.  She is on tapering dose of prednisone and mycophenolate.    3. SVT - on low dose metoprolol.    I have recommended her to return to see my colleague Janet Duke in 3 months and 6 months with me with labs and 6MWT.  Will plan to repeat an echocardiogram this summer when she returns to see us.  Will decide on right heart catheterization based on her clinical course as this was normal to change her treatment significantly    It was a pleasure meeting Ms. Soliman in our pulmonary hypertension clinic continues to [facility].  We thank you for involving us in her care.    Total time today was 45 minutes reviewing notes, imaging, labs, patient visit, orders and documentation       Sincerely,  Daniel Sanchez MD   [facility]  Heart Failure, Transplant, and Mechanical Circulatory Support Cardiology   Cardiovascular  Division  [facility]   [phone]

## 2024-01-19 NOTE — PATIENT INSTRUCTIONS
You were seen today in the Pulmonary Hypertension Clinic at the [facility].     Cardiology Provider you saw during your visit:    Dr. Sanchez    Medication Changes:  No changes    Recommendations:       Follow-up:   Follow-up in 3 months with Janet Duke with labs and echocardiogram prior    Results:       Please call us immediately if you have any syncope (fainting or passing out), chest pain, edema (swelling or weight gain), or decline in your functional status (general decline in how you are feeling).    If you have emergent concerns after hours or on the weekend, please call our on-call Cardiologist at [phone], option [phone]. For emergencies call [phone].     Thank you for allowing us to be a part of your care here at the [facility]    If you have questions or concerns please contact us at:    Kelli Hager RN (P: [phone])    Nurse Coordinator       Pulmonary Hypertension     [facility] [place]         KENNETH Hernandez   (Prior Authorizations)    ()  Clinic   Clinic   Pulmonary Hypertension   Pulmonary Hypertension  [facility]  (P)[phone]    (P) [phone]  (F) [phone]

## 2024-01-23 LAB — FIO2-PRE: 60 %

## 2024-01-24 ENCOUNTER — LAB REQUISITION (OUTPATIENT)
Dept: LAB | Facility: CLINIC | Age: 74
End: 2024-01-24
Payer: MEDICARE

## 2024-01-24 DIAGNOSIS — Z00.00 ENCOUNTER FOR GENERAL ADULT MEDICAL EXAMINATION WITHOUT ABNORMAL FINDINGS: ICD-10-CM

## 2024-01-24 DIAGNOSIS — Z79.2 PROPHYLACTIC ANTIBIOTIC: ICD-10-CM

## 2024-01-24 DIAGNOSIS — E11.22 TYPE 2 DIABETES MELLITUS WITH DIABETIC CHRONIC KIDNEY DISEASE (H): ICD-10-CM

## 2024-01-24 LAB — HBA1C MFR BLD: 5.9 %

## 2024-01-24 PROCEDURE — 83036 HEMOGLOBIN GLYCOSYLATED A1C: CPT | Mod: ORL | Performed by: FAMILY MEDICINE

## 2024-01-24 PROCEDURE — 85027 COMPLETE CBC AUTOMATED: CPT | Mod: ORL | Performed by: FAMILY MEDICINE

## 2024-01-25 LAB
ERYTHROCYTE [DISTWIDTH] IN BLOOD BY AUTOMATED COUNT: 13.6 % (ref 10–15)
HCT VFR BLD AUTO: 38.2 % (ref 35–47)
HGB BLD-MCNC: 12.2 G/DL (ref 11.7–15.7)
MCH RBC QN AUTO: 31.3 PG (ref 26.5–33)
MCHC RBC AUTO-ENTMCNC: 31.9 G/DL (ref 31.5–36.5)
MCV RBC AUTO: 98 FL (ref 78–100)
PLATELET # BLD AUTO: 314 10E3/UL (ref 150–450)
RBC # BLD AUTO: 3.9 10E6/UL (ref 3.8–5.2)
WBC # BLD AUTO: 17.3 10E3/UL (ref 4–11)

## 2024-01-25 RX ORDER — DAPSONE 100 MG/1
TABLET ORAL
Qty: 90 TABLET | Refills: 1 | Status: SHIPPED | OUTPATIENT
Start: 2024-01-25 | End: 2024-06-28

## 2024-02-13 ENCOUNTER — VIRTUAL VISIT (OUTPATIENT)
Dept: PHARMACY | Facility: PHYSICIAN GROUP | Age: 74
End: 2024-02-13
Payer: MEDICARE

## 2024-02-13 DIAGNOSIS — J84.9 ILD (INTERSTITIAL LUNG DISEASE) (H): ICD-10-CM

## 2024-02-13 DIAGNOSIS — E11.9 TYPE 2 DIABETES MELLITUS WITHOUT COMPLICATION, WITH LONG-TERM CURRENT USE OF INSULIN (H): Primary | ICD-10-CM

## 2024-02-13 DIAGNOSIS — Z79.4 TYPE 2 DIABETES MELLITUS WITHOUT COMPLICATION, WITH LONG-TERM CURRENT USE OF INSULIN (H): Primary | ICD-10-CM

## 2024-02-13 DIAGNOSIS — I27.20 PULMONARY HYPERTENSION (H): ICD-10-CM

## 2024-02-13 DIAGNOSIS — Z78.9 TAKES DIETARY SUPPLEMENTS: ICD-10-CM

## 2024-02-13 DIAGNOSIS — H40.009 GLAUCOMA SUSPECT, UNSPECIFIED LATERALITY: ICD-10-CM

## 2024-02-13 DIAGNOSIS — E78.5 HYPERLIPIDEMIA, UNSPECIFIED HYPERLIPIDEMIA TYPE: ICD-10-CM

## 2024-02-13 DIAGNOSIS — K21.9 GASTROESOPHAGEAL REFLUX DISEASE, UNSPECIFIED WHETHER ESOPHAGITIS PRESENT: ICD-10-CM

## 2024-02-13 PROCEDURE — 99207 PR NO CHARGE LOS: CPT | Mod: 93 | Performed by: PHARMACIST

## 2024-02-13 NOTE — PROGRESS NOTES
Medication Therapy Management (MTM) Encounter    ASSESSMENT:                            Medication Adherence/Access:   No issues identified    Diabetes   A1c is significantly under goal of <8% but single glucose readings when checked with labs have been elevated. No home monitoring readings available to review. She is on chronic steroid therapy which may be elevating her glucose but not being captured by A1c. She may benefit from using continuous glucose monitor for 2 weeks to better determine state of her blood sugar before making treatment changes. Transportation is a limitation but thinks family member could help her setup with her cellphone. She may benefit from moving when she takes metformin to morning when she takes prednisone to see if that helps daytime blood sugars more.     Hypertension , Pulmonary Hypertension  Stable.     Interstitial Lung Disease:    Stable. Follow-up plan in place with pulmonology.     Hyperlipidemia   Stable. LDL is <70 mg/dL.     GERD:   Stable.     Glaucoma:    Stable.     Supplements:   Stable.     PLAN:                            Move when you take metformin to the morning when you take prednisone  Talk with your niece about helping you start using Freestyle Cherie 3 continuous monitor for 2 weeks. If she can help you I can try to mail you out a sensor from the clinic.     Follow-up: by phone 2 weeks after starting CGM    Yadira Garcia, PharmD, BCACP  Medication Therapy Management Pharmacist  [facility]  Pager: [phone]    SUBJECTIVE/OBJECTIVE:                          Patty Soliman is a 74 year old female called for an initial visit. She was referred to me from Dr. Kahn.      Reason for visit: Diabetes management.    Allergies/ADRs: Reviewed in chart  Past Medical History: Reviewed in chart  Tobacco: She reports that she has never smoked. She has never used smokeless tobacco.  Alcohol: rare    Medication Adherence/Access:   No issues reported. She has medication  "filled from specialists and primary provider. She has a pretty good routine with her medication.     Diabetes   Metformin 500 mg 2 tablets once daily in evening  Aspirin 325mg daily  Patient is not experiencing side effects. Her A1c does not match up to the high glucose readings seen when checking metabolic panel. She is on steroids. She does not check her blood sugar at home but is open to using continuous glucose monitor to help identify what her glucose is doing. She does have a smartphone but has trouble with transportation to clinic. Her niece is a physician and could probably help her with getting started on CGM if she had it.   Current diabetes symptoms: none  Diet/Exercise: she doesn't have much of an appetite and food does not taste very good for her but tries to eat, she does have 1 regular Pepsi a day she starts at noon and drinks throughout the day but no other sugar beverages     Eye exam is up to date  Foot exam: up to date  Urine Albumin: No results found for: \"UMALCR\"   Lab Results   Component Value Date    A1C 5.9 (H) 01/24/2024      Latest Reference Range & Units 11/13/23 09:41 12/27/23 14:29 01/19/24 09:52   Glucose 70 - 99 mg/dL 245 (H) 311 (H) 202 (H)   (H): Data is abnormally high    Hemoglobin   Date Value Ref Range Status   01/24/2024 12.2 11.7 - 15.7 g/dL Final   ]    Hypertension , Pulmonary Hypertension    Sildenafil 20 mg three times a day   Furosemide 20 mg daily  Tresprostinil 64 mcg inhaled four times a day   Metoprolol tartrate 25 mg once daily  Follows with pulmonary hypertension clinic at the  of . She does think the tresprostinil has made a good difference.   Patient reports no current medication side effects     BP Readings from Last 3 Encounters:   01/19/24 135/71   10/27/23 (!) 156/78   08/29/23 116/54     Pulse Readings from Last 3 Encounters:   01/19/24 99   10/27/23 80   08/29/23 90     Interstitial Lung Disease:    Mycophenolate mofetil 1000 mg morning and 500 mg " eveing  Prednisone 10 mg once daily  Dapsone 100 mg once daily  Fluticasone  mcg/act 1 puff twice a day   Morphine 10 mg/5 mL 1 mL three times a day as needed   Oxygen   Follows closely with pulmonology at [facility]. She does have GI side effects from mycophenolate but that have been able to adjust her dose and it has improved now. Appears at some point she was trailed switched to Myfortic but symptoms were worse. She also remains on daily steroid dose. She has liquid morphine for symptom control but has not tried using it. She uses spacer with inhaler.         Hyperlipidemia     Atorvastatin 10 mg daily  Patient reports no significant myalgias or other side effects.     Recent Labs   Lab Test 08/01/22  1033 03/29/22  1027   CHOL 83 116   HDL 37* 36*   LDL 19 43   TRIG 137 185*     GERD:   Omeprazole 20 mg once daily   Patient reports reflux symptoms associated with mycophenolate.   Patient feels that current regimen is effective.    Glaucoma:    Latanoprost 0.005% 1 drop once daily  No issues reported. Only on one eye drop now.       Supplements:   Co-enzyme Q10 200 mg daily  Vitamin D3 2000 units daily  Potassium 20 mEq daily  No reported issues at this time.   Potassium   Date Value Ref Range Status   01/19/2024 3.9 3.4 - 5.3 mmol/L Final   08/01/2022 3.6 3.4 - 5.3 mmol/L Final       Today's Vitals: There were no vitals taken for this visit.  ----------------      I spent 24 minutes with this patient today. All changes were made via collaborative practice agreement with Meg Kahn MD. A copy of the visit note was provided to the patient's provider(s).    A summary of these recommendations was sent via [employer].    Yadira Garcia, PharmD, BCACP  Medication Therapy Management Pharmacist  [facility]  Pager: [phone]      Telemedicine Visit Details  Type of service:  Telephone visit  Start Time:  10:34 AM  End Time:  10:59 AM     Medication Therapy Recommendations  Type 2 diabetes mellitus  without complication, with long-term current use of insulin (H)    Current Medication: metFORMIN (GLUCOPHAGE) 500 MG tablet   Rationale: Incorrect administration - Dosage too low - Effectiveness   Recommendation: Change Administration Time   Status: Accepted - no CPA Needed

## 2024-02-16 DIAGNOSIS — E87.6 HYPOKALEMIA: ICD-10-CM

## 2024-02-16 RX ORDER — LATANOPROST 50 UG/ML
1 SOLUTION/ DROPS OPHTHALMIC DAILY
COMMUNITY

## 2024-02-16 NOTE — PATIENT INSTRUCTIONS
Recommendations from today's MTM visit:                                                       Move when you take metformin to the morning when you take prednisone  Talk with your niece about helping you start using Freestyle Cherie 3 continuous monitor for 2 weeks. If she can help you I can try to mail you out a sensor from the clinic.     Follow-up: by phone 2 weeks after starting CGM    It was great speaking with you today.  I value your experience and would be very thankful for your time in providing feedback in our clinic survey. In the next few days, you may receive an email or text message from [employer] with a link to a survey related to your clinical pharmacist.    To schedule another MTM appointment, please call [phone].    My Clinical Pharmacist's contact information:                                                      Please feel free to contact me with any questions or concerns you have.      Yadira Garcia, PharmD, BCACP  Medication Therapy Management Pharmacist  [facility]  Voicemail: [phone]

## 2024-02-20 DIAGNOSIS — I27.20 PULMONARY HYPERTENSION (H): ICD-10-CM

## 2024-02-20 RX ORDER — SILDENAFIL CITRATE 20 MG/1
20 TABLET ORAL 3 TIMES DAILY
Qty: 270 TABLET | Refills: 3 | Status: SHIPPED | OUTPATIENT
Start: 2024-02-20

## 2024-02-20 RX ORDER — POTASSIUM CHLORIDE 1500 MG/1
40 TABLET, EXTENDED RELEASE ORAL 2 TIMES DAILY
Qty: 180 TABLET | Refills: 3 | Status: SHIPPED | OUTPATIENT
Start: 2024-02-20

## 2024-02-20 NOTE — TELEPHONE ENCOUNTER
1/19/2024  [facility] Daniel Casey MD  Cardiovascular Disease Right heart failure, NYHA class 3 (H     Potassium   Date Value Ref Range Status   01/19/2024 3.9 3.4 - 5.3 mmol/L Final   08/01/2022 3.6 3.4 - 5.3 mmol/L Final

## 2024-02-26 ENCOUNTER — TELEPHONE (OUTPATIENT)
Dept: PULMONOLOGY | Facility: CLINIC | Age: 74
End: 2024-02-26
Payer: MEDICARE

## 2024-02-26 DIAGNOSIS — J45.40 MODERATE PERSISTENT REACTIVE AIRWAY DISEASE WITHOUT COMPLICATION: Primary | ICD-10-CM

## 2024-02-26 DIAGNOSIS — J84.9 ILD (INTERSTITIAL LUNG DISEASE) (H): ICD-10-CM

## 2024-02-26 DIAGNOSIS — J67.9 HYPERSENSITIVITY PNEUMONIA (H): ICD-10-CM

## 2024-02-26 DIAGNOSIS — J45.40 MODERATE PERSISTENT REACTIVE AIRWAY DISEASE WITHOUT COMPLICATION: ICD-10-CM

## 2024-02-26 RX ORDER — PREDNISONE 10 MG/1
10 TABLET ORAL DAILY
Qty: 30 TABLET | Refills: 3 | Status: SHIPPED | OUTPATIENT
Start: 2024-02-26 | End: 2024-03-22 | Stop reason: DRUGHIGH

## 2024-02-26 RX ORDER — BUDESONIDE 180 UG/1
AEROSOL, POWDER RESPIRATORY (INHALATION)
Refills: 0 | OUTPATIENT
Start: 2024-02-26

## 2024-02-26 NOTE — TELEPHONE ENCOUNTER
Insurance required change:     RE: Rx Auth Request  Received: Today  Shannan Rodriguez MD  P Interstitial Lung Disease Clinic Nurses-  Caller: Unspecified (Today,  9:57 AM)  Yes, we can do arnuity 100.    Rx sent to pharmacy.   Vandana Edward, RN BSN

## 2024-03-02 ENCOUNTER — HEALTH MAINTENANCE LETTER (OUTPATIENT)
Age: 74
End: 2024-03-02

## 2024-03-22 ENCOUNTER — OFFICE VISIT (OUTPATIENT)
Dept: PULMONOLOGY | Facility: CLINIC | Age: 74
End: 2024-03-22
Attending: INTERNAL MEDICINE
Payer: MEDICARE

## 2024-03-22 ENCOUNTER — LAB (OUTPATIENT)
Dept: LAB | Facility: CLINIC | Age: 74
End: 2024-03-22
Payer: MEDICARE

## 2024-03-22 VITALS
OXYGEN SATURATION: 97 % | HEIGHT: 62 IN | BODY MASS INDEX: 25.8 KG/M2 | WEIGHT: 140.2 LBS | HEART RATE: 82 BPM | DIASTOLIC BLOOD PRESSURE: 82 MMHG | SYSTOLIC BLOOD PRESSURE: 136 MMHG

## 2024-03-22 DIAGNOSIS — J67.9 HYPERSENSITIVITY PNEUMONIA (H): ICD-10-CM

## 2024-03-22 DIAGNOSIS — I27.20 PULMONARY HTN (H): ICD-10-CM

## 2024-03-22 DIAGNOSIS — J84.9 ILD (INTERSTITIAL LUNG DISEASE) (H): ICD-10-CM

## 2024-03-22 DIAGNOSIS — I50.810 RIGHT HEART FAILURE, NYHA CLASS 3 (H): ICD-10-CM

## 2024-03-22 DIAGNOSIS — R06.09 DOE (DYSPNEA ON EXERTION): ICD-10-CM

## 2024-03-22 DIAGNOSIS — J45.40 MODERATE PERSISTENT REACTIVE AIRWAY DISEASE WITHOUT COMPLICATION: ICD-10-CM

## 2024-03-22 DIAGNOSIS — J84.9 ILD (INTERSTITIAL LUNG DISEASE) (H): Primary | ICD-10-CM

## 2024-03-22 LAB
ALBUMIN SERPL BCG-MCNC: 4.1 G/DL (ref 3.5–5.2)
ALP SERPL-CCNC: 76 U/L (ref 40–150)
ALT SERPL W P-5'-P-CCNC: 19 U/L (ref 0–50)
ANION GAP SERPL CALCULATED.3IONS-SCNC: 11 MMOL/L (ref 7–15)
AST SERPL W P-5'-P-CCNC: 20 U/L (ref 0–45)
BASOPHILS # BLD AUTO: 0.1 10E3/UL (ref 0–0.2)
BASOPHILS NFR BLD AUTO: 0 %
BILIRUB SERPL-MCNC: 0.9 MG/DL
BUN SERPL-MCNC: 20.3 MG/DL (ref 8–23)
CALCIUM SERPL-MCNC: 9.8 MG/DL (ref 8.8–10.2)
CHLORIDE SERPL-SCNC: 101 MMOL/L (ref 98–107)
CREAT SERPL-MCNC: 0.98 MG/DL (ref 0.51–0.95)
DEPRECATED HCO3 PLAS-SCNC: 29 MMOL/L (ref 22–29)
EGFRCR SERPLBLD CKD-EPI 2021: 60 ML/MIN/1.73M2
EOSINOPHIL # BLD AUTO: 0.2 10E3/UL (ref 0–0.7)
EOSINOPHIL NFR BLD AUTO: 1 %
ERYTHROCYTE [DISTWIDTH] IN BLOOD BY AUTOMATED COUNT: 13.5 % (ref 10–15)
GLUCOSE SERPL-MCNC: 168 MG/DL (ref 70–99)
HCT VFR BLD AUTO: 39.2 % (ref 35–47)
HGB BLD-MCNC: 12.3 G/DL (ref 11.7–15.7)
IMM GRANULOCYTES # BLD: 0.1 10E3/UL
IMM GRANULOCYTES NFR BLD: 1 %
LYMPHOCYTES # BLD AUTO: 2.9 10E3/UL (ref 0.8–5.3)
LYMPHOCYTES NFR BLD AUTO: 23 %
MCH RBC QN AUTO: 30.8 PG (ref 26.5–33)
MCHC RBC AUTO-ENTMCNC: 31.4 G/DL (ref 31.5–36.5)
MCV RBC AUTO: 98 FL (ref 78–100)
MONOCYTES # BLD AUTO: 1.2 10E3/UL (ref 0–1.3)
MONOCYTES NFR BLD AUTO: 10 %
NEUTROPHILS # BLD AUTO: 8.2 10E3/UL (ref 1.6–8.3)
NEUTROPHILS NFR BLD AUTO: 65 %
NRBC # BLD AUTO: 0 10E3/UL
NRBC BLD AUTO-RTO: 0 /100
NT-PROBNP SERPL-MCNC: 1385 PG/ML (ref 0–1800)
PLATELET # BLD AUTO: 225 10E3/UL (ref 150–450)
POTASSIUM SERPL-SCNC: 3.5 MMOL/L (ref 3.4–5.3)
PROT SERPL-MCNC: 6.3 G/DL (ref 6.4–8.3)
RBC # BLD AUTO: 3.99 10E6/UL (ref 3.8–5.2)
SODIUM SERPL-SCNC: 141 MMOL/L (ref 135–145)
WBC # BLD AUTO: 12.6 10E3/UL (ref 4–11)

## 2024-03-22 PROCEDURE — 83880 ASSAY OF NATRIURETIC PEPTIDE: CPT | Performed by: PATHOLOGY

## 2024-03-22 PROCEDURE — G0463 HOSPITAL OUTPT CLINIC VISIT: HCPCS | Performed by: INTERNAL MEDICINE

## 2024-03-22 PROCEDURE — 94375 RESPIRATORY FLOW VOLUME LOOP: CPT | Performed by: INTERNAL MEDICINE

## 2024-03-22 PROCEDURE — 94729 DIFFUSING CAPACITY: CPT | Performed by: INTERNAL MEDICINE

## 2024-03-22 PROCEDURE — 36415 COLL VENOUS BLD VENIPUNCTURE: CPT | Performed by: PATHOLOGY

## 2024-03-22 PROCEDURE — 99215 OFFICE O/P EST HI 40 MIN: CPT | Mod: 25 | Performed by: INTERNAL MEDICINE

## 2024-03-22 PROCEDURE — 80053 COMPREHEN METABOLIC PANEL: CPT | Performed by: PATHOLOGY

## 2024-03-22 PROCEDURE — 94726 PLETHYSMOGRAPHY LUNG VOLUMES: CPT | Performed by: INTERNAL MEDICINE

## 2024-03-22 PROCEDURE — 85025 COMPLETE CBC W/AUTO DIFF WBC: CPT | Performed by: PATHOLOGY

## 2024-03-22 RX ORDER — PREDNISONE 10 MG/1
40 TABLET ORAL DAILY
Qty: 20 TABLET | Refills: 0 | Status: SHIPPED | OUTPATIENT
Start: 2024-03-22 | End: 2024-03-27

## 2024-03-22 RX ORDER — FLUTICASONE FUROATE 200 UG/1
1 POWDER RESPIRATORY (INHALATION) DAILY
Qty: 30 EACH | Refills: 11 | Status: SHIPPED | OUTPATIENT
Start: 2024-03-22 | End: 2024-04-24 | Stop reason: SINTOL

## 2024-03-22 RX ORDER — MYCOPHENOLATE MOFETIL 500 MG/1
TABLET ORAL
Qty: 360 TABLET | Refills: 4 | Status: SHIPPED | OUTPATIENT
Start: 2024-03-22

## 2024-03-22 RX ORDER — PREDNISONE 5 MG/1
15 TABLET ORAL DAILY
Qty: 90 TABLET | Refills: 5 | Status: SHIPPED | OUTPATIENT
Start: 2024-03-22 | End: 2024-09-23

## 2024-03-22 NOTE — LETTER
3/22/2024         RE: Patty Soliman  [address]  [facility] [address]        Dear Colleague,    Thank you for referring your patient, Patty Soliman, to the [facility] FOR LUNG SCIENCE AND [facility] [place]. Please see a copy of my visit note below.    [facility] Interstitial Lung Disease Clinic    Reason for Visit  Patty Soliman is a 72 year old year old female who is being seen for shortness of breath.   HPI from previous visit   Patient is 72 years old female with past history outlined below presents today to establish care with us in the ILD clinic.  She had progressive exertional dyspnea over the last several years but does not seem to be much worse in the last 6 months.  Back in August 2020, CTA was negative for pulmonary embolism, ILD described as probable UIP pattern was observed.  She has been seen by our pulmonary hypertension clinic for severe PH by echocardiography, RVSP 60+ with structural and functional impairment of the RV.  She underwent right heart cath and pulmonary hypertension was confirmed with negative vasoreactivity testing. Her cardiac index was also noticed to be reduced. She was recently started on ilioprost and sildenafil, with some symptomatic improvement. Cardiology was not convinced that the degree of ILD explains her pulmonary hypertension, which I do agree with based on the CT scan from August 2022.  However, the most recent CT scan from today shows significant progression in ILD.  Her pattern is inconsistent with UIP to me and there is significant mosaicism and signs of small airway disease suggestive of hypersensitivity pneumonitis.  She did not have a trial of steroids before.  Neither does she have an inhaler or nebulizer treatments attempted.  She does have some down products like a jacket and a comforter, but no other triggers for HP by history.  She denies connective tissue disease symptoms with the exception to dysphagia which seems  to be early phase and has not been worked up before.  She does not have Raynaud's phenomena, arthralgias, muscle stiffness, oral ulcers, epistaxis, sinus symptoms, skin rash, skin ulcers, or hematuria.  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    Updates from 2/1/23  Patient returns today for follow up. She continues to have dyspnea at rest, and still using oxygen at 8-10 LPM for several months. She is accompanied by her daughter. We re-increased prednisone to 25 mg daily from 20 given worsening dyspnea.     Updates from 6/2/23  Patient returns today for follow-up.  She continues to require continuous home oxygen, and does have desaturations to the low 80s at times.  She is able to ambulate to the bathroom by herself.  She has been very reluctant to see palliative care and has not made the appointment yet.  She is using portable liquid oxygen, and also follows over instructions for CellCept 1000 mg in the morning and 15 mg in the evening.  She continues to have some stomach irritation, and we reduced the CellCept from 3000 mg daily, we prescribed prednisone 10 mg daily which she continues to take.  She is also having some diarrhea that she thinks is related to the CellCept.  She does not have cough or sputum production.  She has no symptoms to suggest exacerbation or pneumonia.    Updates from 10/27/23  Patty returns for follow up. She is now keeping her O2 at 10 LPM as she always desaturates on 8 LPM if she does any activity, then she is lagging behind on correcting her oxygenation when she increases it to 10 LPM. She had an episode of tachycardia to 160 with desaturation to 70s% recently after spending sometime outside the house while receiving 10 LPM. She has already seen palliative care and signed a POLST indicated DNR/DNI status. She is still not introduced to hospice idea.     Updates from today 3/22/24  Patty returns for follow up. Continues to use oxygen  "at 10 LPM at rest and 12 LPM with activity, but requiring 12 LPM more frequently compared to 3-4 months ago. She denies new cough or sputum production. She is following with palliative.     Vitals: /82   Pulse 82   Ht 1.575 m (5' 2\")   Wt 63.6 kg (140 lb 3.2 oz)   SpO2 97%   BMI 25.64 kg/m      Exam:   GENERAL APPEARANCE: Well developed, well nourished, alert, and in no apparent distress.  RESP: Reduced flow throughout.  No crackles. No rhonchi. No wheezes.  CV: Normal S1, S2, regular rhythm, normal rate. No murmur.   MS: extremities normal. No clubbing. No cyanosis.  SKIN: no rash on limited exam.  NEURO: Mentation intact, speech normal, normal gait and stance.  PSYCH: mentation appears normal. and affect normal/bright.    Results:  RHC 8/19/22  RA 9/16/9 mmHg  RV 65/11 mmHg  PA 64/20/37 mmHg  CWP 21/22/13 mmHg  CO (Terrance) 1.77 L/min  CI (Terracne) 1.11 L/min/m2  CO (TD) 2.45 L/min  CI (TD) 1.54 L/min/m2   mmHg   SVR 4,338 dynes (by measured Terrance)  PVR 13.56 GRAFF (by measured Etrrance)  PVR 9.8 (by TD)     No significant response to vasodilator therapy.     PFTs 3/22/24    My interpretation: progression of severe intra-thoracic restriction.           Interpretation:   Isolated severe reduction in DLCO in keeping with pulmonary vascular disease initially and mild degree of restriction by TLC.    Chest imaging:  Personally reviewed CT chest images from today 2/1 and compared to 11/2022  Agreed with radiology report:                                                             IMPRESSION: Minimal if any improvement in the subpleural fibrosis in  the right lower lobe with no other change in the interval otherwise.  Diffuse atherosclerosis. Severe mid thoracic dextrocurvature.  Unchanged left adrenal gland hyperplasia.         Assessment and plan:   ILD, favor hypersensitivity pneumonitis (HP) from down products  Patient is 74F with ILD and chronic hypoxic respiratory failure presents for follow up on advanced " ILD. Her ILD seems to be progressive hypersensitivity pneumonitis pattern, based on mosaicism on CT and aitrapping on exhalation. Her exposure is down which she removed from her environment after we discussed back in early 2023. She is a lifetime nonsmoker which is associated with slight increase in HP incidence. Her disease has progressed to chronic type as her TLC is reflecting restriction. She also has significant pulmonary hypertension that is contributing largely to her oxygen needs. Autoimmune serologies have been negative as well as HP panels. We tried a prednisone taper in 2022 but unfortunately no subjective or objective improvement is seen, however, it is possible that it stabilized the disease. Prednisone currently at 10 mg with MMF 1000 mg qAM and 500 mg qHS, due to stomach irritation and reflux symptoms it was reduced from 1500 BID, and were worse when we tried mycophenolioc acid so she is back on MMF. Esophogram was normal.  Plan:   - Increase arnuity to 200 mcg given significant symptomatic response   - New Rx sent for refills on MMF indicating the most recent dose adjustment of 1000 mg qAM and 500 mg at bedtime  - Prednisone 40 mg po daily x5 for recent worsening in symptoms and increased O2 needs covering for ILD flare  - Once prednisone burst completed, start 15 mg po daily indefinitely instead of 10 mg daily  - Continue MMF monitoring labs, alk phos elevation resolved   - Follow up with Palliative care , she has already signed POLST for DNR/DNI, likely appropriate for home with hospice in the next few months, especially that her O2 needs are up to 12 LPM with activity and desaturating to 60s% at times      Precapillary pulmonary hypertension  Receiving ilioprost and sildenafil per PH clinic with symptomatic improvement.     3. Dysphagia    4. Chronic hypoxic respiratory failure  Needing 12 LPM with activity, increased from 4 months ago  - Continue Portable home O2   - Continue follow up with  Palliative Care     5. Osteopenia  Noted on CT chest, Dexa scan confirms with t-score -1.3 of left femoral neck.       Return in 3 months over video    Total time spent today was 40 minutes     Sincerely,        Shannan Rodriguez MD

## 2024-03-22 NOTE — PATIENT INSTRUCTIONS
Ayr nasal saline gel over the counter     Start prednisone 40 mg daily for 5 days then 15 mg daily afterwards until we follow up     Continue cellcept the way you are taking it, a new Rx for refills was sent    Increase arnuity to 200, a new Rx has been sent     Please call our direct ILD nurses line for questions and concerns: [phone]    For scheduling, please call: [phone]

## 2024-03-22 NOTE — PROGRESS NOTES
[facility] Interstitial Lung Disease Clinic    Reason for Visit  Patty Soliman is a 72 year old year old female who is being seen for shortness of breath.   HPI from previous visit   Patient is 72 years old female with past history outlined below presents today to establish care with us in the ILD clinic.  She had progressive exertional dyspnea over the last several years but does not seem to be much worse in the last 6 months.  Back in August 2020, CTA was negative for pulmonary embolism, ILD described as probable UIP pattern was observed.  She has been seen by our pulmonary hypertension clinic for severe PH by echocardiography, RVSP 60+ with structural and functional impairment of the RV.  She underwent right heart cath and pulmonary hypertension was confirmed with negative vasoreactivity testing. Her cardiac index was also noticed to be reduced. She was recently started on ilioprost and sildenafil, with some symptomatic improvement. Cardiology was not convinced that the degree of ILD explains her pulmonary hypertension, which I do agree with based on the CT scan from August 2022.  However, the most recent CT scan from today shows significant progression in ILD.  Her pattern is inconsistent with UIP to me and there is significant mosaicism and signs of small airway disease suggestive of hypersensitivity pneumonitis.  She did not have a trial of steroids before.  Neither does she have an inhaler or nebulizer treatments attempted.  She does have some down products like a jacket and a comforter, but no other triggers for HP by history.  She denies connective tissue disease symptoms with the exception to dysphagia which seems to be early phase and has not been worked up before.  She does not have Raynaud's phenomena, arthralgias, muscle stiffness, oral ulcers, epistaxis, sinus symptoms, skin rash, skin ulcers, or hematuria.  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    Updates from 2/1/23  Patient returns today for follow up. She continues to have dyspnea at rest, and still using oxygen at 8-10 LPM for several months. She is accompanied by her daughter. We re-increased prednisone to 25 mg daily from 20 given worsening dyspnea.     Updates from 6/2/23  Patient returns today for follow-up.  She continues to require continuous home oxygen, and does have desaturations to the low 80s at times.  She is able to ambulate to the bathroom by herself.  She has been very reluctant to see palliative care and has not made the appointment yet.  She is using portable liquid oxygen, and also follows over instructions for CellCept 1000 mg in the morning and 15 mg in the evening.  She continues to have some stomach irritation, and we reduced the CellCept from 3000 mg daily, we prescribed prednisone 10 mg daily which she continues to take.  She is also having some diarrhea that she thinks is related to the CellCept.  She does not have cough or sputum production.  She has no symptoms to suggest exacerbation or pneumonia.    Updates from 10/27/23  Patty returns for follow up. She is now keeping her O2 at 10 LPM as she always desaturates on 8 LPM if she does any activity, then she is lagging behind on correcting her oxygenation when she increases it to 10 LPM. She had an episode of tachycardia to 160 with desaturation to 70s% recently after spending sometime outside the house while receiving 10 LPM. She has already seen palliative care and signed a POLST indicated DNR/DNI status. She is still not introduced to hospice idea.     Updates from today 3/22/24  Patty returns for follow up. Continues to use oxygen at 10 LPM at rest and 12 LPM with activity, but requiring 12 LPM more frequently compared to 3-4 months ago. She denies new cough or sputum production. She is following with palliative.     Vitals: /82   Pulse 82   Ht 1.575 m (5' 2\")   Wt 63.6 kg (140 lb 3.2 oz)   " SpO2 97%   BMI 25.64 kg/m      Exam:   GENERAL APPEARANCE: Well developed, well nourished, alert, and in no apparent distress.  RESP: Reduced flow throughout.  No crackles. No rhonchi. No wheezes.  CV: Normal S1, S2, regular rhythm, normal rate. No murmur.   MS: extremities normal. No clubbing. No cyanosis.  SKIN: no rash on limited exam.  NEURO: Mentation intact, speech normal, normal gait and stance.  PSYCH: mentation appears normal. and affect normal/bright.    Results:  RHC 8/19/22  RA 9/16/9 mmHg  RV 65/11 mmHg  PA 64/20/37 mmHg  CWP 21/22/13 mmHg  CO (Terrance) 1.77 L/min  CI (Terrance) 1.11 L/min/m2  CO (TD) 2.45 L/min  CI (TD) 1.54 L/min/m2   mmHg   SVR 4,338 dynes (by measured Terrance)  PVR 13.56 GRAFF (by measured Terrance)  PVR 9.8 (by TD)     No significant response to vasodilator therapy.     PFTs 3/22/24    My interpretation: progression of severe intra-thoracic restriction.           Interpretation:   Isolated severe reduction in DLCO in keeping with pulmonary vascular disease initially and mild degree of restriction by TLC.    Chest imaging:  Personally reviewed CT chest images from today 2/1 and compared to 11/2022  Agreed with radiology report:                                                             IMPRESSION: Minimal if any improvement in the subpleural fibrosis in  the right lower lobe with no other change in the interval otherwise.  Diffuse atherosclerosis. Severe mid thoracic dextrocurvature.  Unchanged left adrenal gland hyperplasia.         Assessment and plan:   ILD, favor hypersensitivity pneumonitis (HP) from down products  Patient is 74F with ILD and chronic hypoxic respiratory failure presents for follow up on advanced ILD. Her ILD seems to be progressive hypersensitivity pneumonitis pattern, based on mosaicism on CT and aitrapping on exhalation. Her exposure is down which she removed from her environment after we discussed back in early 2023. She is a lifetime nonsmoker which is associated  with slight increase in HP incidence. Her disease has progressed to chronic type as her TLC is reflecting restriction. She also has significant pulmonary hypertension that is contributing largely to her oxygen needs. Autoimmune serologies have been negative as well as HP panels. We tried a prednisone taper in 2022 but unfortunately no subjective or objective improvement is seen, however, it is possible that it stabilized the disease. Prednisone currently at 10 mg with MMF 1000 mg qAM and 500 mg qHS, due to stomach irritation and reflux symptoms it was reduced from 1500 BID, and were worse when we tried mycophenolioc acid so she is back on MMF. Esophogram was normal.  Plan:   - Increase arnuity to 200 mcg given significant symptomatic response   - New Rx sent for refills on MMF indicating the most recent dose adjustment of 1000 mg qAM and 500 mg at bedtime  - Prednisone 40 mg po daily x5 for recent worsening in symptoms and increased O2 needs covering for ILD flare  - Once prednisone burst completed, start 15 mg po daily indefinitely instead of 10 mg daily  - Continue MMF monitoring labs, alk phos elevation resolved   - Follow up with Palliative care , she has already signed POLST for DNR/DNI, likely appropriate for home with hospice in the next few months, especially that her O2 needs are up to 12 LPM with activity and desaturating to 60s% at times      Precapillary pulmonary hypertension  Receiving ilioprost and sildenafil per PH clinic with symptomatic improvement.     3. Dysphagia    4. Chronic hypoxic respiratory failure  Needing 12 LPM with activity, increased from 4 months ago  - Continue Portable home O2   - Continue follow up with Palliative Care     5. Osteopenia  Noted on CT chest, Dexa scan confirms with t-score -1.3 of left femoral neck.       Return in 3 months over video    Total time spent today was 40 minutes

## 2024-03-27 LAB
DLCOUNC-%PRED-PRE: 31 %
DLCOUNC-PRE: 5.56 ML/MIN/MMHG
DLCOUNC-PRED: 17.4 ML/MIN/MMHG
ERV-%PRED-PRE: 44 %
ERV-PRE: 0.37 L
ERV-PRED: 0.81 L
EXPTIME-PRE: 7.04 SEC
FEF2575-%PRED-PRE: 133 %
FEF2575-PRE: 2 L/SEC
FEF2575-PRED: 1.49 L/SEC
FEFMAX-%PRED-PRE: 136 %
FEFMAX-PRE: 6.52 L/SEC
FEFMAX-PRED: 4.76 L/SEC
FEV1-%PRED-PRE: 88 %
FEV1-PRE: 1.53 L
FEV1FEV6-PRE: 89 %
FEV1FEV6-PRED: 78 %
FEV1FVC-PRE: 89 %
FEV1FVC-PRED: 79 %
FEV1SVC-PRE: 87 %
FEV1SVC-PRED: 63 %
FIFMAX-PRE: 4.03 L/SEC
FRCPLETH-%PRED-PRE: 69 %
FRCPLETH-PRE: 1.78 L
FRCPLETH-PRED: 2.58 L
FVC-%PRED-PRE: 77 %
FVC-PRE: 1.73 L
FVC-PRED: 2.23 L
IC-%PRED-PRE: 85 %
IC-PRE: 1.39 L
IC-PRED: 1.63 L
RVPLETH-%PRED-PRE: 68 %
RVPLETH-PRE: 1.41 L
RVPLETH-PRED: 2.05 L
TLCPLETH-%PRED-PRE: 70 %
TLCPLETH-PRE: 3.16 L
TLCPLETH-PRED: 4.52 L
VA-%PRED-PRE: 64 %
VA-PRE: 2.64 L
VC-%PRED-PRE: 63 %
VC-PRE: 1.75 L
VC-PRED: 2.75 L

## 2024-04-22 ENCOUNTER — HOSPITAL ENCOUNTER (OUTPATIENT)
Dept: CARDIOLOGY | Facility: HOSPITAL | Age: 74
Discharge: HOME | End: 2024-04-22
Attending: INTERNAL MEDICINE | Admitting: INTERNAL MEDICINE
Payer: MEDICARE

## 2024-04-22 ENCOUNTER — LAB (OUTPATIENT)
Dept: CARDIOLOGY | Facility: CLINIC | Age: 74
End: 2024-04-22
Payer: MEDICARE

## 2024-04-22 DIAGNOSIS — R06.09 DOE (DYSPNEA ON EXERTION): ICD-10-CM

## 2024-04-22 DIAGNOSIS — I27.20 PULMONARY HTN (H): ICD-10-CM

## 2024-04-22 LAB
ANION GAP SERPL CALCULATED.3IONS-SCNC: 10 MMOL/L (ref 7–15)
BUN SERPL-MCNC: 12 MG/DL (ref 8–23)
CALCIUM SERPL-MCNC: 10.1 MG/DL (ref 8.8–10.2)
CHLORIDE SERPL-SCNC: 102 MMOL/L (ref 98–107)
CREAT SERPL-MCNC: 0.92 MG/DL (ref 0.51–0.95)
DEPRECATED HCO3 PLAS-SCNC: 29 MMOL/L (ref 22–29)
EGFRCR SERPLBLD CKD-EPI 2021: 64 ML/MIN/1.73M2
ERYTHROCYTE [DISTWIDTH] IN BLOOD BY AUTOMATED COUNT: 13.9 % (ref 10–15)
GLUCOSE SERPL-MCNC: 195 MG/DL (ref 70–99)
HCT VFR BLD AUTO: 39.6 % (ref 35–47)
HGB BLD-MCNC: 12.1 G/DL (ref 11.7–15.7)
LVEF ECHO: NORMAL
MCH RBC QN AUTO: 31.3 PG (ref 26.5–33)
MCHC RBC AUTO-ENTMCNC: 30.6 G/DL (ref 31.5–36.5)
MCV RBC AUTO: 103 FL (ref 78–100)
NT-PROBNP SERPL-MCNC: 1840 PG/ML (ref 0–1800)
PLATELET # BLD AUTO: 272 10E3/UL (ref 150–450)
POTASSIUM SERPL-SCNC: 4 MMOL/L (ref 3.4–5.3)
RBC # BLD AUTO: 3.86 10E6/UL (ref 3.8–5.2)
SODIUM SERPL-SCNC: 141 MMOL/L (ref 135–145)
WBC # BLD AUTO: 14.8 10E3/UL (ref 4–11)

## 2024-04-22 PROCEDURE — 93306 TTE W/DOPPLER COMPLETE: CPT | Mod: 26 | Performed by: GENERAL ACUTE CARE HOSPITAL

## 2024-04-22 PROCEDURE — 80048 BASIC METABOLIC PNL TOTAL CA: CPT

## 2024-04-22 PROCEDURE — 93306 TTE W/DOPPLER COMPLETE: CPT

## 2024-04-22 PROCEDURE — 36415 COLL VENOUS BLD VENIPUNCTURE: CPT

## 2024-04-22 PROCEDURE — 85027 COMPLETE CBC AUTOMATED: CPT

## 2024-04-22 PROCEDURE — 83880 ASSAY OF NATRIURETIC PEPTIDE: CPT

## 2024-04-22 NOTE — PROGRESS NOTES
Virtual Visit Details          CARDIOLOGY PH CLINIC VIDEO VISIT    Date of video visit: 04/23/24      Patty Soliman is a 74 year old female who is being evaluated via a billable video visit.      Self reported vitals:  Weight: 142#  /68      MEDICATIONS:  Current Outpatient Medications   Medication Sig Dispense Refill    aspirin 325 MG tablet Take 325 mg by mouth daily      atorvastatin (LIPITOR) 10 MG tablet Take 10 mg by mouth daily      cholecalciferol 50 MCG (2000 UT) tablet Take 2,000 Units by mouth daily      coenzyme Q10 200 mg capsule [COENZYME Q10 200 MG CAPSULE] Take 200 mg by mouth daily.      dapsone (ACZONE) 100 MG tablet TAKE 1 TABLET BY MOUTH EVERY DAY 90 tablet 1    fluticasone (ARNUITY ELLIPTA) 100 MCG/ACT inhaler Inhale 1 puff into the lungs daily 14 each 3    fluticasone (ARNUITY ELLIPTA) 200 MCG/ACT inhaler Inhale 1 puff into the lungs daily 30 each 11    furosemide (LASIX) 20 MG tablet Take 20 mg by mouth daily      latanoprost (XALATAN) 0.005 % ophthalmic solution Place 1 drop into both eyes daily      metFORMIN (GLUCOPHAGE) 500 MG tablet Take 1,000 mg by mouth daily (with dinner) HS      metoprolol tartrate (LOPRESSOR) 25 MG tablet [METOPROLOL TARTRATE (LOPRESSOR) 25 MG TABLET] Take 25 mg by mouth daily.       morphine 10 MG/5ML solution Take 1 mL (2 mg) by mouth 3 times daily as needed (Dyspnea) 90 mL 0    mycophenolate (GENERIC EQUIVALENT) 500 MG tablet Take 2 tablets (1,000 mg) by mouth daily before breakfast AND 1 tablet (500 mg) every evening. 360 tablet 4    omeprazole (PRILOSEC) 20 MG capsule [OMEPRAZOLE (PRILOSEC) 20 MG CAPSULE] Take 20 mg by mouth daily before breakfast.       potassium chloride ER (KLOR-CON M) 20 MEQ CR tablet Take 2 tablets (40 mEq) by mouth 2 times daily 180 tablet 3    predniSONE (DELTASONE) 5 MG tablet Take 3 tablets (15 mg) by mouth daily for 187 days 90 tablet 5    sildenafil (REVATIO) 20 MG tablet TAKE 1 TABLET BY MOUTH 3 TIMES DAILY. 270 tablet 3     Treprostinil 64 MCG POWD Inhale 64 mcg into the lungs 4 times daily         Primary PH cardiologist: Dr. Sanchez        HPI:  Ms. Soliman is a pleasant 73 year old female with a PMHx including DM, HTN, dyslipidemia, CKD, and ILD. She also has pulmonary hypertension in the setting of her ILD, and is maintained on Tyvaso and sildenafil.    She was seen last by Dr. Sanchez in January at which time she remained at her baseline with high oxygen requirements but without progression. No changes were made at that time.    Today, I'm meeting back virtually with Patty to follow up. She tells me she's doing ok--has good days and bad days. She admits she has not been taking her diuretics much because she has significant incontinence which affects her quality of life. Despite this, she denies any new LE edema and weight is stable. No new CP, palpitations, or dizziness/presyncope.     Patty had an echo done yesterday in preparation for our visit which I reviewed. EF remains preserved at 55-60%. RV size is normal, with mildly reduced function. RVSP estimated at 46mmHg with no new valvular issues. Overall this was unchanged from prior.    Labs also done yesterday reviewed as below.      CURRENT PULMONARY HYPERTENSION REGIMEN:     PAH Rx: Tyvaso 64mcg DPI QID, sildenafil 20mg TID     Diuretics: Lasix 20mg PRN      Oxygen: 10-11L NC      Anticoagulation: None     Pulm: Dr. Rodriguez         Assessment/Plan:     1. Pulmonary hypertension.              --Ms. Soliman has moderate to severe PAH with ongoing mild RV dysfunction. She is on Tyvaso DPI 64mcg QID, along with sildenafil 20mg TID. Clinically, she sounds to be stable from a cardiopulmonary standpoint and echo from yesterday shows stable mild RV dysfunction.              --As today is a virtual visit I cannot fully assess her volume status. Due to significant incontinence, she is not using her diuretics much. Weight is stable and she reports no worsening edema. NT-proBNP is up  slightly today; I recommended she try to take it on days she knows she will be home near a restroom which she is agreeable to.               --Continue to follow in the ILD clinic. She is on immunosuppression and appears to be steroid dependent. She remains on supplemental oxygen as above as well.      2. Palpitations.              --She has a hx of SVT and is maintained on low dose metoprolol. Symptoms sound to be minimal recently.     Follow up plan: Return to see Dr. Sanchez (virtual ok) with local labs prior. I asked the patient to call sooner with any new concerns.         Testing/labs:    Most recent labs:    Latest Reference Range & Units 04/22/24 09:41   Sodium 135 - 145 mmol/L 141   Potassium 3.4 - 5.3 mmol/L 4.0   Chloride 98 - 107 mmol/L 102   Carbon Dioxide (CO2) 22 - 29 mmol/L 29   Urea Nitrogen 8.0 - 23.0 mg/dL 12.0   Creatinine 0.51 - 0.95 mg/dL 0.92   GFR Estimate >60 mL/min/1.73m2 64   Calcium 8.8 - 10.2 mg/dL 10.1   Anion Gap 7 - 15 mmol/L 10   Glucose 70 - 99 mg/dL 195 (H)   N-Terminal Pro Bnp 0 - 1,800 pg/mL 1,840 (H)   WBC 4.0 - 11.0 10e3/uL 14.8 (H)   Hemoglobin 11.7 - 15.7 g/dL 12.1   Hematocrit 35.0 - 47.0 % 39.6   Platelet Count 150 - 450 10e3/uL 272   RBC Count 3.80 - 5.20 10e6/uL 3.86   MCV 78 - 100 fL 103 (H)   MCH 26.5 - 33.0 pg 31.3   MCHC 31.5 - 36.5 g/dL 30.6 (L)   RDW 10.0 - 15.0 % 13.9   (H): Data is abnormally high  (L): Data is abnormally low      Other most recent pertinent testing:    Echo 4/22/24  Interpretation Summary     1. Left ventricular chamber size is normal. Mild concentric increase in wall  thickness. Systolic function is normal. The visually estimated left  ventricular ejection fraction is 55-60%.  2. Right ventricular chamber size is normal. Systolic function is mildly  reduced.  3. Mild left atrial enlargement.  4. No hemodynamically significant valvular abnormalities.  5. Mild pulmonary hypertension is present with an estimated pulmonary artery  systolic  pressure of 46 mmHg.  6. Compared to the prior study dated 3/15/2023, there has been no significant  change.      [facility] 3/15/23  Hemodynamics    Right Heart Catheterization:  /80/112 mmHg   BPM    RA 8/10/7 mmHg  RV 90/7 mmHg  PA 90/29/49 mmHg  PCW 11/11/9 mmHg  Terrance CO 3.71 L/min Normal = 4.0-8.0 L/min  Terrance CI 2.32 L/min/m2 Normal = 2.5-4.0 L/min/m2  TD CO 3.57 L/min Normal = 4.0-8.0 L/min  TD CI 2.23 L/min/m2 Normal = 2.5-4.0 L/min/m2  PA sat 58%   Hgb 11 g/dL   PVR 10.8 Woods units  SVR 1056.1 dynes-sec/cm5   Right sided filling pressures are normal. Left sided filling pressures are normal. Severely elevated pulmonary artery hypertension. Left ventricular filling pressures are normal. Normal cardiac output level.         NYHA Functional Class:  3      Video-Visit Details    Type of service:  Video Visit    Video Start Time: 0914  Video End Time: 0922    An additional 15 minutes was spent today performing chart and history review, pre and post visit documentation, patient education, and care coordination.    The longitudinal plan of care for the diagnosis(es)/condition(s) as documented were addressed during this visit. Due to the added complexity in care, I will continue to support Patty in the subsequent management and with ongoing continuity of care.      Originating Location (pt. Location): Home    Distant Location (provider location):  On-site    Platform used for Video Visit: Kobe Duke PA-C  [facility]  Pager [phone]

## 2024-04-23 ENCOUNTER — TELEPHONE (OUTPATIENT)
Dept: CARDIOLOGY | Facility: CLINIC | Age: 74
End: 2024-04-23

## 2024-04-23 ENCOUNTER — VIRTUAL VISIT (OUTPATIENT)
Dept: CARDIOLOGY | Facility: CLINIC | Age: 74
End: 2024-04-23
Attending: PHYSICIAN ASSISTANT
Payer: MEDICARE

## 2024-04-23 VITALS
SYSTOLIC BLOOD PRESSURE: 116 MMHG | HEIGHT: 62 IN | DIASTOLIC BLOOD PRESSURE: 68 MMHG | OXYGEN SATURATION: 96 % | BODY MASS INDEX: 26.13 KG/M2 | HEART RATE: 92 BPM | WEIGHT: 142 LBS

## 2024-04-23 DIAGNOSIS — I27.20 PULMONARY HTN (H): ICD-10-CM

## 2024-04-23 DIAGNOSIS — R06.09 DOE (DYSPNEA ON EXERTION): ICD-10-CM

## 2024-04-23 PROCEDURE — 99213 OFFICE O/P EST LOW 20 MIN: CPT | Mod: 95 | Performed by: PHYSICIAN ASSISTANT

## 2024-04-23 ASSESSMENT — PAIN SCALES - GENERAL: PAINLEVEL: NO PAIN (0)

## 2024-04-23 NOTE — NURSING NOTE
Is the patient currently in the state of [place]? YES    Visit mode:VIDEO    If the visit is dropped, the patient can be reconnected by: VIDEO VISIT: Text to cell phone:   Telephone Information:   Mobile [phone]       Will anyone else be joining the visit? NO  (If patient encounters technical issues they should call [phone]    How would you like to obtain your AVS? MyChart    Are changes needed to the allergy or medication list? No    Are refills needed on medications prescribed by this physician? NO    Reason for visit: RECHECK    Farida CORNELL

## 2024-04-23 NOTE — LETTER
4/23/2024      RE: Patty Soliman  [address]  [facility] [address]       Dear Colleague,    Thank you for the opportunity to participate in the care of your patient, Patty Soliman, at the [facility] [place] [facility]. Please see a copy of my visit note below.    Virtual Visit Details          CARDIOLOGY PH CLINIC VIDEO VISIT    Date of video visit: 04/23/24      Patty Soliman is a 74 year old female who is being evaluated via a billable video visit.      Self reported vitals:  Weight: 142#  /68      MEDICATIONS:  Current Outpatient Medications   Medication Sig Dispense Refill     aspirin 325 MG tablet Take 325 mg by mouth daily       atorvastatin (LIPITOR) 10 MG tablet Take 10 mg by mouth daily       cholecalciferol 50 MCG (2000 UT) tablet Take 2,000 Units by mouth daily       coenzyme Q10 200 mg capsule [COENZYME Q10 200 MG CAPSULE] Take 200 mg by mouth daily.       dapsone (ACZONE) 100 MG tablet TAKE 1 TABLET BY MOUTH EVERY DAY 90 tablet 1     fluticasone (ARNUITY ELLIPTA) 100 MCG/ACT inhaler Inhale 1 puff into the lungs daily 14 each 3     fluticasone (ARNUITY ELLIPTA) 200 MCG/ACT inhaler Inhale 1 puff into the lungs daily 30 each 11     furosemide (LASIX) 20 MG tablet Take 20 mg by mouth daily       latanoprost (XALATAN) 0.005 % ophthalmic solution Place 1 drop into both eyes daily       metFORMIN (GLUCOPHAGE) 500 MG tablet Take 1,000 mg by mouth daily (with dinner) HS       metoprolol tartrate (LOPRESSOR) 25 MG tablet [METOPROLOL TARTRATE (LOPRESSOR) 25 MG TABLET] Take 25 mg by mouth daily.        morphine 10 MG/5ML solution Take 1 mL (2 mg) by mouth 3 times daily as needed (Dyspnea) 90 mL 0     mycophenolate (GENERIC EQUIVALENT) 500 MG tablet Take 2 tablets (1,000 mg) by mouth daily before breakfast AND 1 tablet (500 mg) every evening. 360 tablet 4     omeprazole (PRILOSEC) 20 MG capsule [OMEPRAZOLE (PRILOSEC) 20 MG CAPSULE] Take  20 mg by mouth daily before breakfast.        potassium chloride ER (KLOR-CON M) 20 MEQ CR tablet Take 2 tablets (40 mEq) by mouth 2 times daily 180 tablet 3     predniSONE (DELTASONE) 5 MG tablet Take 3 tablets (15 mg) by mouth daily for 187 days 90 tablet 5     sildenafil (REVATIO) 20 MG tablet TAKE 1 TABLET BY MOUTH 3 TIMES DAILY. 270 tablet 3     Treprostinil 64 MCG POWD Inhale 64 mcg into the lungs 4 times daily         Primary PH cardiologist: Dr. Sanchez        HPI:  Ms. Soliman is a pleasant 73 year old female with a PMHx including DM, HTN, dyslipidemia, CKD, and ILD. She also has pulmonary hypertension in the setting of her ILD, and is maintained on Tyvaso and sildenafil.    She was seen last by Dr. Sanchez in January at which time she remained at her baseline with high oxygen requirements but without progression. No changes were made at that time.    Today, I'm meeting back virtually with Patty to follow up. She tells me she's doing ok--has good days and bad days. She admits she has not been taking her diuretics much because she has significant incontinence which affects her quality of life. Despite this, she denies any new LE edema and weight is stable. No new CP, palpitations, or dizziness/presyncope.     Patty had an echo done yesterday in preparation for our visit which I reviewed. EF remains preserved at 55-60%. RV size is normal, with mildly reduced function. RVSP estimated at 46mmHg with no new valvular issues. Overall this was unchanged from prior.    Labs also done yesterday reviewed as below.      CURRENT PULMONARY HYPERTENSION REGIMEN:     PAH Rx: Tyvaso 64mcg DPI QID, sildenafil 20mg TID     Diuretics: Lasix 20mg PRN      Oxygen: 10-11L NC      Anticoagulation: None     Pulm: Dr. Rodriguez         Assessment/Plan:     1. Pulmonary hypertension.              --Ms. Soliman has moderate to severe PAH with ongoing mild RV dysfunction. She is on Tyvaso DPI 64mcg QID, along with sildenafil 20mg TID.  Clinically, she sounds to be stable from a cardiopulmonary standpoint and echo from yesterday shows stable mild RV dysfunction.              --As today is a virtual visit I cannot fully assess her volume status. Due to significant incontinence, she is not using her diuretics much. Weight is stable and she reports no worsening edema. NT-proBNP is up slightly today; I recommended she try to take it on days she knows she will be home near a restroom which she is agreeable to.               --Continue to follow in the ILD clinic. She is on immunosuppression and appears to be steroid dependent. She remains on supplemental oxygen as above as well.      2. Palpitations.              --She has a hx of SVT and is maintained on low dose metoprolol. Symptoms sound to be minimal recently.     Follow up plan: Return to see Dr. Sanchez (virtual ok) with local labs prior. I asked the patient to call sooner with any new concerns.         Testing/labs:    Most recent labs:    Latest Reference Range & Units 04/22/24 09:41   Sodium 135 - 145 mmol/L 141   Potassium 3.4 - 5.3 mmol/L 4.0   Chloride 98 - 107 mmol/L 102   Carbon Dioxide (CO2) 22 - 29 mmol/L 29   Urea Nitrogen 8.0 - 23.0 mg/dL 12.0   Creatinine 0.51 - 0.95 mg/dL 0.92   GFR Estimate >60 mL/min/1.73m2 64   Calcium 8.8 - 10.2 mg/dL 10.1   Anion Gap 7 - 15 mmol/L 10   Glucose 70 - 99 mg/dL 195 (H)   N-Terminal Pro Bnp 0 - 1,800 pg/mL 1,840 (H)   WBC 4.0 - 11.0 10e3/uL 14.8 (H)   Hemoglobin 11.7 - 15.7 g/dL 12.1   Hematocrit 35.0 - 47.0 % 39.6   Platelet Count 150 - 450 10e3/uL 272   RBC Count 3.80 - 5.20 10e6/uL 3.86   MCV 78 - 100 fL 103 (H)   MCH 26.5 - 33.0 pg 31.3   MCHC 31.5 - 36.5 g/dL 30.6 (L)   RDW 10.0 - 15.0 % 13.9   (H): Data is abnormally high  (L): Data is abnormally low      Other most recent pertinent testing:    Echo 4/22/24  Interpretation Summary     1. Left ventricular chamber size is normal. Mild concentric increase in wall  thickness. Systolic function is  normal. The visually estimated left  ventricular ejection fraction is 55-60%.  2. Right ventricular chamber size is normal. Systolic function is mildly  reduced.  3. Mild left atrial enlargement.  4. No hemodynamically significant valvular abnormalities.  5. Mild pulmonary hypertension is present with an estimated pulmonary artery  systolic pressure of 46 mmHg.  6. Compared to the prior study dated 3/15/2023, there has been no significant  change.      RHC 3/15/23  Hemodynamics    Right Heart Catheterization:  /80/112 mmHg   BPM    RA 8/10/7 mmHg  RV 90/7 mmHg  PA 90/29/49 mmHg  PCW 11/11/9 mmHg  Terrance CO 3.71 L/min Normal = 4.0-8.0 L/min  Terrance CI 2.32 L/min/m2 Normal = 2.5-4.0 L/min/m2  TD CO 3.57 L/min Normal = 4.0-8.0 L/min  TD CI 2.23 L/min/m2 Normal = 2.5-4.0 L/min/m2  PA sat 58%   Hgb 11 g/dL   PVR 10.8 Woods units  SVR 1056.1 dynes-sec/cm5   Right sided filling pressures are normal. Left sided filling pressures are normal. Severely elevated pulmonary artery hypertension. Left ventricular filling pressures are normal. Normal cardiac output level.         NYHA Functional Class:  3      Video-Visit Details    Type of service:  Video Visit    Video Start Time: 0914  Video End Time: 0922    An additional 15 minutes was spent today performing chart and history review, pre and post visit documentation, patient education, and care coordination.    The longitudinal plan of care for the diagnosis(es)/condition(s) as documented were addressed during this visit. Due to the added complexity in care, I will continue to support Patty in the subsequent management and with ongoing continuity of care.      Originating Location (pt. Location): Home    Distant Location (provider location):  On-site    Platform used for Video Visit: Kobe Duke PA-C  [facility]  Pager [phone]      Please do not hesitate to contact me if you have any questions/concerns.     Sincerely,     JYOTSNA Avalos

## 2024-04-23 NOTE — TELEPHONE ENCOUNTER
4/23/2024 1:31PM Paradise Pham  Patient confirmed scheduled appointment:  Date: 7/15/2024  Time: 9:30AM  Visit type: Return Pulmonary Hypertension (video visit)  Provider: Dr. Sanchez  Location: Video visit; [address], [place] [address]  Testing/imaging: pt completing labs locally prior  Additional notes: 4/23 Scheduled [facility] video visit w/ Dr. Sanchez 7/15. Pt will have labs completed locally prior. RODGER Pham 4/23/2024 1:31PM

## 2024-04-23 NOTE — NURSING NOTE
"Follow Up Plan:  - 3 month virtual follow up with Dr. Sanchez, labs locally prior    Staff message sent to [facility] to fax labs closer to the time of the appt. Orders placed and patient marked \"ready for checkout.\" Tiana Whitmore RN on 4/23/2024 at 9:27 AM    "

## 2024-04-23 NOTE — PATIENT INSTRUCTIONS
You were seen today in the Pulmonary Hypertension Clinic at the [facility].     Cardiology Provider you saw during your visit:    JYOTSNA Avalos    Medication Changes:  - Okay to continue to take your Lasix as needed    Results:   Component      Latest Ref Rng 4/22/2024  9:41 AM   Sodium      135 - 145 mmol/L 141    Potassium      3.4 - 5.3 mmol/L 4.0    Chloride      98 - 107 mmol/L 102    Carbon Dioxide (CO2)      22 - 29 mmol/L 29    Anion Gap      7 - 15 mmol/L 10    Urea Nitrogen      8.0 - 23.0 mg/dL 12.0    Creatinine      0.51 - 0.95 mg/dL 0.92    GFR Estimate      >60 mL/min/1.73m2 64    Calcium      8.8 - 10.2 mg/dL 10.1    Glucose      70 - 99 mg/dL 195 (H)    WBC      4.0 - 11.0 10e3/uL 14.8 (H)    RBC Count      3.80 - 5.20 10e6/uL 3.86    Hemoglobin      11.7 - 15.7 g/dL 12.1    Hematocrit      35.0 - 47.0 % 39.6    MCV      78 - 100 fL 103 (H)    MCH      26.5 - 33.0 pg 31.3    MCHC      31.5 - 36.5 g/dL 30.6 (L)    RDW      10.0 - 15.0 % 13.9    Platelet Count      150 - 450 10e3/uL 272    N-Terminal Pro Bnp      0 - 1,800 pg/mL 1,840 (H)       Legend:  (H) High  (L) Low    Follow-up:   - 3 month follow up virtually with Dr. Sanchez, labs locally prior    Please call us immediately if you have any syncope (fainting or passing out), chest pain, edema (swelling or weight gain), or decline in your functional status (general decline in how you are feeling).    If you have emergent concerns after hours or on the weekend, please call our on-call Cardiologist at [phone], option 4. For emergencies call 911.     Thank you for allowing us to be a part of your care here at the [facility]    If you have questions or concerns please contact us at:    Kelli Hager RN (P: [phone])    Nurse Coordinator       Pulmonary Hypertension     [facility]         KENNETH Macias   (Prior Auths & Pt  Assistance)   ()  Clinic   Clinic   Pulmonary Hypertension   Pulmonary Hypertension  [facility]  [facility]  (P)[phone]    (P) [phone]  (F) [phone]                 show

## 2024-04-24 ENCOUNTER — TELEPHONE (OUTPATIENT)
Dept: PULMONOLOGY | Facility: CLINIC | Age: 74
End: 2024-04-24
Payer: MEDICARE

## 2024-04-24 DIAGNOSIS — J45.40 MODERATE PERSISTENT REACTIVE AIRWAY DISEASE WITHOUT COMPLICATION: ICD-10-CM

## 2024-04-24 DIAGNOSIS — J67.9 HYPERSENSITIVITY PNEUMONIA (H): ICD-10-CM

## 2024-04-24 NOTE — TELEPHONE ENCOUNTER
RN spoke with pt and reviewed going back to 100. Updated Rx sent to requested pharmacy. Pt will update if any further questions or concerns.   Vandana Edward RN BSN

## 2024-06-14 ENCOUNTER — TELEPHONE (OUTPATIENT)
Dept: CARDIOLOGY | Facility: CLINIC | Age: 74
End: 2024-06-14
Payer: MEDICARE

## 2024-06-14 NOTE — TELEPHONE ENCOUNTER
Called patient to remind her to obtain labs prior to July follow-up with Daniel. Patient will complete    Kelli Hager RN on 6/14/2024 at 10:28 AM

## 2024-06-15 ENCOUNTER — MYC MEDICAL ADVICE (OUTPATIENT)
Dept: PULMONOLOGY | Facility: CLINIC | Age: 74
End: 2024-06-15
Payer: MEDICARE

## 2024-06-20 ENCOUNTER — LAB (OUTPATIENT)
Dept: CARDIOLOGY | Facility: CLINIC | Age: 74
End: 2024-06-20
Payer: MEDICARE

## 2024-06-20 DIAGNOSIS — I27.20 PULMONARY HTN (H): ICD-10-CM

## 2024-06-20 DIAGNOSIS — R06.09 DOE (DYSPNEA ON EXERTION): ICD-10-CM

## 2024-06-20 LAB
ALBUMIN SERPL BCG-MCNC: 3.6 G/DL (ref 3.5–5.2)
ALP SERPL-CCNC: 90 U/L (ref 40–150)
ALT SERPL W P-5'-P-CCNC: 17 U/L (ref 0–50)
ANION GAP SERPL CALCULATED.3IONS-SCNC: 12 MMOL/L (ref 7–15)
AST SERPL W P-5'-P-CCNC: 16 U/L (ref 0–45)
BILIRUB SERPL-MCNC: 0.8 MG/DL
BUN SERPL-MCNC: 19.2 MG/DL (ref 8–23)
CALCIUM SERPL-MCNC: 9.4 MG/DL (ref 8.8–10.2)
CHLORIDE SERPL-SCNC: 100 MMOL/L (ref 98–107)
CREAT SERPL-MCNC: 0.85 MG/DL (ref 0.51–0.95)
DEPRECATED HCO3 PLAS-SCNC: 27 MMOL/L (ref 22–29)
EGFRCR SERPLBLD CKD-EPI 2021: 71 ML/MIN/1.73M2
ERYTHROCYTE [DISTWIDTH] IN BLOOD BY AUTOMATED COUNT: 13.8 % (ref 10–15)
GLUCOSE SERPL-MCNC: 204 MG/DL (ref 70–99)
HCT VFR BLD AUTO: 34.5 % (ref 35–47)
HGB BLD-MCNC: 11 G/DL (ref 11.7–15.7)
MCH RBC QN AUTO: 32 PG (ref 26.5–33)
MCHC RBC AUTO-ENTMCNC: 31.9 G/DL (ref 31.5–36.5)
MCV RBC AUTO: 100 FL (ref 78–100)
NT-PROBNP SERPL-MCNC: 1535 PG/ML (ref 0–1800)
PLATELET # BLD AUTO: 243 10E3/UL (ref 150–450)
POTASSIUM SERPL-SCNC: 4.1 MMOL/L (ref 3.4–5.3)
PROT SERPL-MCNC: 5.8 G/DL (ref 6.4–8.3)
RBC # BLD AUTO: 3.44 10E6/UL (ref 3.8–5.2)
SODIUM SERPL-SCNC: 139 MMOL/L (ref 135–145)
WBC # BLD AUTO: 13 10E3/UL (ref 4–11)

## 2024-06-20 PROCEDURE — 83880 ASSAY OF NATRIURETIC PEPTIDE: CPT

## 2024-06-20 PROCEDURE — 36415 COLL VENOUS BLD VENIPUNCTURE: CPT

## 2024-06-20 PROCEDURE — 80053 COMPREHEN METABOLIC PANEL: CPT

## 2024-06-20 PROCEDURE — 85027 COMPLETE CBC AUTOMATED: CPT

## 2024-06-21 ENCOUNTER — VIRTUAL VISIT (OUTPATIENT)
Dept: PULMONOLOGY | Facility: CLINIC | Age: 74
End: 2024-06-21
Attending: INTERNAL MEDICINE
Payer: MEDICARE

## 2024-06-21 DIAGNOSIS — J20.9 ACUTE BRONCHITIS, UNSPECIFIED ORGANISM: ICD-10-CM

## 2024-06-21 DIAGNOSIS — J84.9 ILD (INTERSTITIAL LUNG DISEASE) (H): Primary | ICD-10-CM

## 2024-06-21 PROCEDURE — 99214 OFFICE O/P EST MOD 30 MIN: CPT | Mod: 95 | Performed by: INTERNAL MEDICINE

## 2024-06-21 RX ORDER — PREDNISONE 10 MG/1
40 TABLET ORAL DAILY
Qty: 28 TABLET | Refills: 0 | Status: SHIPPED | OUTPATIENT
Start: 2024-06-21 | End: 2024-06-28

## 2024-06-21 RX ORDER — AZITHROMYCIN 250 MG/1
TABLET, FILM COATED ORAL
Qty: 6 TABLET | Refills: 0 | Status: SHIPPED | OUTPATIENT
Start: 2024-06-21 | End: 2024-06-26

## 2024-06-21 NOTE — PROGRESS NOTES
Virtual Visit Details    Type of service:  Video Visit   Video Start Time: 10:47 AM  Video End Time:11:01 AM    Originating Location (pt. Location): Home    Distant Location (provider location):  On-site  Platform used for Video Visit: [facility]    Reason for Visit  Patty Soliman is a 74 year old year old female who is being seen for shortness of breath.   HPI from previous visit   Patient is 74 years old female with past history outlined below presents today to establish care with us in the ILD clinic.  She had progressive exertional dyspnea over the last several years but does not seem to be much worse in the last 6 months.  Back in August 2020, CTA was negative for pulmonary embolism, ILD described as probable UIP pattern was observed.  She has been seen by our pulmonary hypertension clinic for severe PH by echocardiography, RVSP 60+ with structural and functional impairment of the RV.  She underwent right heart cath and pulmonary hypertension was confirmed with negative vasoreactivity testing. Her cardiac index was also noticed to be reduced. She was recently started on ilioprost and sildenafil, with some symptomatic improvement. Cardiology was not convinced that the degree of ILD explains her pulmonary hypertension, which I do agree with based on the CT scan from August 2022.  However, the most recent CT scan from today shows significant progression in ILD.  Her pattern is inconsistent with UIP to me and there is significant mosaicism and signs of small airway disease suggestive of hypersensitivity pneumonitis.  She did not have a trial of steroids before.  Neither does she have an inhaler or nebulizer treatments attempted.  She does have some down products like a jacket and a comforter, but no other triggers for HP by history.  She denies connective tissue disease symptoms with the exception to dysphagia which seems to be early phase and has not been  worked up before.  She does not have Raynaud's phenomena, arthralgias, muscle stiffness, oral ulcers, epistaxis, sinus symptoms, skin rash, skin ulcers, or hematuria.  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    Updates from 2/1/23  Patient returns today for follow up. She continues to have dyspnea at rest, and still using oxygen at 8-10 LPM for several months. She is accompanied by her daughter. We re-increased prednisone to 25 mg daily from 20 given worsening dyspnea.     Updates from 6/2/23  Patient returns today for follow-up.  She continues to require continuous home oxygen, and does have desaturations to the low 80s at times.  She is able to ambulate to the bathroom by herself.  She has been very reluctant to see palliative care and has not made the appointment yet.  She is using portable liquid oxygen, and also follows over instructions for CellCept 1000 mg in the morning and 15 mg in the evening.  She continues to have some stomach irritation, and we reduced the CellCept from 3000 mg daily, we prescribed prednisone 10 mg daily which she continues to take.  She is also having some diarrhea that she thinks is related to the CellCept.  She does not have cough or sputum production.  She has no symptoms to suggest exacerbation or pneumonia.    Updates from 10/27/23  Patty returns for follow up. She is now keeping her O2 at 10 LPM as she always desaturates on 8 LPM if she does any activity, then she is lagging behind on correcting her oxygenation when she increases it to 10 LPM. She had an episode of tachycardia to 160 with desaturation to 70s% recently after spending sometime outside the house while receiving 10 LPM. She has already seen palliative care and signed a POLST indicated DNR/DNI status. She is still not introduced to hospice idea.     Updates from 3/22/24  Patty returns for follow up. Continues to use oxygen at 10 LPM at rest and 12 LPM with  activity, but requiring 12 LPM more frequently compared to 3-4 months ago. She denies new cough or sputum production. She is following with palliative.     Updates from today 6/21/24:  Virtual return for follow up. Complains of watery eyes with coughing, less sputum today, pale yellow color sputum recently. Blood nose only when she blows. O2 at 13 LPM, but despite that it drops to 70s% with activity. Was given morphine liquid by palliative care but reluctant to try it.     Vitals: There were no vitals taken for this visit.    Exam:   GENERAL APPEARANCE: Well developed, well nourished, alert, and in no apparent distress.      Results:  RHC 8/19/22  RA 9/16/9 mmHg  RV 65/11 mmHg  PA 64/20/37 mmHg  CWP 21/22/13 mmHg  CO (Terrance) 1.77 L/min  CI (Terrance) 1.11 L/min/m2  CO (TD) 2.45 L/min  CI (TD) 1.54 L/min/m2   mmHg   SVR 4,338 dynes (by measured Terrance)  PVR 13.56 GRAFF (by measured Terrance)  PVR 9.8 (by TD)     No significant response to vasodilator therapy.     PFTs 3/22/24    My interpretation: progression of severe intra-thoracic restriction.           Interpretation:   Isolated severe reduction in DLCO in keeping with pulmonary vascular disease initially and mild degree of restriction by TLC.    Chest imaging:  Personally reviewed CT chest images from today 2/1 and compared to 11/2022  Agreed with radiology report:                                                             IMPRESSION: Minimal if any improvement in the subpleural fibrosis in  the right lower lobe with no other change in the interval otherwise.  Diffuse atherosclerosis. Severe mid thoracic dextrocurvature.  Unchanged left adrenal gland hyperplasia.         Assessment and plan:   ILD, favor hypersensitivity pneumonitis (HP) from down products  Patient is 74F with ILD and chronic hypoxic respiratory failure presents for follow up on advanced ILD. Her ILD seems to be progressive hypersensitivity pneumonitis pattern, based on mosaicism on CT and aitrapping  on exhalation. Her exposure is down which she removed from her environment after we discussed back in early 2023. She is a lifetime nonsmoker which is associated with slight increase in HP incidence. Her disease has progressed to chronic type as her TLC is reflecting restriction. She also has significant pulmonary hypertension that is contributing largely to her oxygen needs. Autoimmune serologies have been negative as well as HP panels. We tried a prednisone taper in 2022 but unfortunately no subjective or objective improvement is seen, however, it is possible that it stabilized the disease. Arnuity 200 started last visit seems to slightly alleviate dyspnea. She is on 13 LPM with rest and activity and despite that she desaturates to 70s%. She has good family support and seeing palliative care. Prednisone currently at 15 mg with MMF 1000 mg qAM and 500 mg qHS, due to stomach irritation and reflux symptoms it was reduced from 1500 BID, and were worse when we tried mycophenolioc acid so she is back on MMF. Esophogram was normal.  Plan:   - Continue arnuity 200   - Prednisone 40 mg po daily x7 days  - Zpak x5 days   - Once prednisone burst completed, then return to 15 mg po daily   - Discontinue MMF monitoring labs   - Follow up with Palliative care , she has already signed POLST for DNR/DNI, likely appropriate for home with hospice in the next few months, especially that her O2 needs are up to 13 LPM with activity and desaturating to 70s% at times and maximum medical therapy with minimal response  - PO morphine encouraged to relieve sensation of dyspnea      Precapillary pulmonary hypertension  Was receiving vasodilator therapy from PH clinic     3. Dysphagia    4. Chronic hypoxic respiratory failure  Needing 13 LPM with activity, increased from 6 months ago  - Continue Portable home O2   - Continue follow up with Palliative Care     5. Osteopenia  Noted on CT chest, Dexa scan confirms with t-score -1.3 of left  femoral neck.       Return in 4-6 months over video

## 2024-06-21 NOTE — LETTER
6/21/2024      Patty Soliman  [address]  [facility] [address]      Dear Colleague,    Thank you for referring your patient, Patty Soliman, to the [facility] LUNG SCIENCE AND [facility] [place]. Please see a copy of my visit note below.    Virtual Visit Details    Type of service:  Video Visit   Video Start Time: 10:47 AM  Video End Time:11:01 AM    Originating Location (pt. Location): Home    Distant Location (provider location):  On-site  Platform used for Video Visit: [facility]    Reason for Visit  Patty Soliman is a 74 year old year old female who is being seen for shortness of breath.   HPI from previous visit   Patient is 74 years old female with past history outlined below presents today to establish care with us in the ILD clinic.  She had progressive exertional dyspnea over the last several years but does not seem to be much worse in the last 6 months.  Back in August 2020, CTA was negative for pulmonary embolism, ILD described as probable UIP pattern was observed.  She has been seen by our pulmonary hypertension clinic for severe PH by echocardiography, RVSP 60+ with structural and functional impairment of the RV.  She underwent right heart cath and pulmonary hypertension was confirmed with negative vasoreactivity testing. Her cardiac index was also noticed to be reduced. She was recently started on ilioprost and sildenafil, with some symptomatic improvement. Cardiology was not convinced that the degree of ILD explains her pulmonary hypertension, which I do agree with based on the CT scan from August 2022.  However, the most recent CT scan from today shows significant progression in ILD.  Her pattern is inconsistent with UIP to me and there is significant mosaicism and signs of small airway disease suggestive of hypersensitivity pneumonitis.  She did not have a trial of steroids before.  Neither does she have an  inhaler or nebulizer treatments attempted.  She does have some down products like a jacket and a comforter, but no other triggers for HP by history.  She denies connective tissue disease symptoms with the exception to dysphagia which seems to be early phase and has not been worked up before.  She does not have Raynaud's phenomena, arthralgias, muscle stiffness, oral ulcers, epistaxis, sinus symptoms, skin rash, skin ulcers, or hematuria.  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    Updates from 2/1/23  Patient returns today for follow up. She continues to have dyspnea at rest, and still using oxygen at 8-10 LPM for several months. She is accompanied by her daughter. We re-increased prednisone to 25 mg daily from 20 given worsening dyspnea.     Updates from 6/2/23  Patient returns today for follow-up.  She continues to require continuous home oxygen, and does have desaturations to the low 80s at times.  She is able to ambulate to the bathroom by herself.  She has been very reluctant to see palliative care and has not made the appointment yet.  She is using portable liquid oxygen, and also follows over instructions for CellCept 1000 mg in the morning and 15 mg in the evening.  She continues to have some stomach irritation, and we reduced the CellCept from 3000 mg daily, we prescribed prednisone 10 mg daily which she continues to take.  She is also having some diarrhea that she thinks is related to the CellCept.  She does not have cough or sputum production.  She has no symptoms to suggest exacerbation or pneumonia.    Updates from 10/27/23  Patty returns for follow up. She is now keeping her O2 at 10 LPM as she always desaturates on 8 LPM if she does any activity, then she is lagging behind on correcting her oxygenation when she increases it to 10 LPM. She had an episode of tachycardia to 160 with desaturation to 70s% recently after spending sometime outside the  house while receiving 10 LPM. She has already seen palliative care and signed a POLST indicated DNR/DNI status. She is still not introduced to hospice idea.     Updates from 3/22/24  Patty returns for follow up. Continues to use oxygen at 10 LPM at rest and 12 LPM with activity, but requiring 12 LPM more frequently compared to 3-4 months ago. She denies new cough or sputum production. She is following with palliative.     Updates from today 6/21/24:  Virtual return for follow up. Complains of watery eyes with coughing, less sputum today, pale yellow color sputum recently. Blood nose only when she blows. O2 at 13 LPM, but despite that it drops to 70s% with activity. Was given morphine liquid by palliative care but reluctant to try it.     Vitals: There were no vitals taken for this visit.    Exam:   GENERAL APPEARANCE: Well developed, well nourished, alert, and in no apparent distress.      Results:  RHC 8/19/22  RA 9/16/9 mmHg  RV 65/11 mmHg  PA 64/20/37 mmHg  CWP 21/22/13 mmHg  CO (Terrance) 1.77 L/min  CI (Terrance) 1.11 L/min/m2  CO (TD) 2.45 L/min  CI (TD) 1.54 L/min/m2   mmHg   SVR 4,338 dynes (by measured Terrance)  PVR 13.56 GRAFF (by measured Terrance)  PVR 9.8 (by TD)     No significant response to vasodilator therapy.     PFTs 3/22/24    My interpretation: progression of severe intra-thoracic restriction.           Interpretation:   Isolated severe reduction in DLCO in keeping with pulmonary vascular disease initially and mild degree of restriction by TLC.    Chest imaging:  Personally reviewed CT chest images from today 2/1 and compared to 11/2022  Agreed with radiology report:                                                             IMPRESSION: Minimal if any improvement in the subpleural fibrosis in  the right lower lobe with no other change in the interval otherwise.  Diffuse atherosclerosis. Severe mid thoracic dextrocurvature.  Unchanged left adrenal gland hyperplasia.         Assessment and plan:   ILD,  favor hypersensitivity pneumonitis (HP) from down products  Patient is 74F with ILD and chronic hypoxic respiratory failure presents for follow up on advanced ILD. Her ILD seems to be progressive hypersensitivity pneumonitis pattern, based on mosaicism on CT and aitrapping on exhalation. Her exposure is down which she removed from her environment after we discussed back in early 2023. She is a lifetime nonsmoker which is associated with slight increase in HP incidence. Her disease has progressed to chronic type as her TLC is reflecting restriction. She also has significant pulmonary hypertension that is contributing largely to her oxygen needs. Autoimmune serologies have been negative as well as HP panels. We tried a prednisone taper in 2022 but unfortunately no subjective or objective improvement is seen, however, it is possible that it stabilized the disease. Arnuity 200 started last visit seems to slightly alleviate dyspnea. She is on 13 LPM with rest and activity and despite that she desaturates to 70s%. She has good family support and seeing palliative care. Prednisone currently at 15 mg with MMF 1000 mg qAM and 500 mg qHS, due to stomach irritation and reflux symptoms it was reduced from 1500 BID, and were worse when we tried mycophenolioc acid so she is back on MMF. Esophogram was normal.  Plan:   - Continue arnuity 200   - Prednisone 40 mg po daily x7 days  - Zpak x5 days   - Once prednisone burst completed, then return to 15 mg po daily   - Discontinue MMF monitoring labs   - Follow up with Palliative care , she has already signed POLST for DNR/DNI, likely appropriate for home with hospice in the next few months, especially that her O2 needs are up to 13 LPM with activity and desaturating to 70s% at times and maximum medical therapy with minimal response  - PO morphine encouraged to relieve sensation of dyspnea      Precapillary pulmonary hypertension  Was receiving vasodilator therapy from PH clinic     3.  Dysphagia    4. Chronic hypoxic respiratory failure  Needing 13 LPM with activity, increased from 6 months ago  - Continue Portable home O2   - Continue follow up with Palliative Care     5. Osteopenia  Noted on CT chest, Dexa scan confirms with t-score -1.3 of left femoral neck.       Return in 4-6 months over video        Sincerely,        Shannan Rodriguez MD

## 2024-06-21 NOTE — PATIENT INSTRUCTIONS
Please call our direct ILD nurses line for questions and concerns: [phone]    For scheduling, please call: [phone]

## 2024-06-28 DIAGNOSIS — Z79.2 PROPHYLACTIC ANTIBIOTIC: ICD-10-CM

## 2024-06-28 RX ORDER — DAPSONE 100 MG/1
TABLET ORAL
Qty: 90 TABLET | Refills: 1 | Status: SHIPPED | OUTPATIENT
Start: 2024-06-28

## 2024-07-12 ENCOUNTER — LAB (OUTPATIENT)
Dept: CARDIOLOGY | Facility: CLINIC | Age: 74
End: 2024-07-12
Payer: MEDICARE

## 2024-07-12 DIAGNOSIS — R06.09 DOE (DYSPNEA ON EXERTION): Primary | ICD-10-CM

## 2024-07-12 DIAGNOSIS — R06.09 DOE (DYSPNEA ON EXERTION): ICD-10-CM

## 2024-07-12 LAB
ANION GAP SERPL CALCULATED.3IONS-SCNC: 8 MMOL/L (ref 7–15)
BUN SERPL-MCNC: 12.1 MG/DL (ref 8–23)
CALCIUM SERPL-MCNC: 9.4 MG/DL (ref 8.8–10.2)
CHLORIDE SERPL-SCNC: 101 MMOL/L (ref 98–107)
CREAT SERPL-MCNC: 0.78 MG/DL (ref 0.51–0.95)
DEPRECATED HCO3 PLAS-SCNC: 30 MMOL/L (ref 22–29)
EGFRCR SERPLBLD CKD-EPI 2021: 78 ML/MIN/1.73M2
ERYTHROCYTE [DISTWIDTH] IN BLOOD BY AUTOMATED COUNT: 14.1 % (ref 10–15)
GLUCOSE SERPL-MCNC: 198 MG/DL (ref 70–99)
HCT VFR BLD AUTO: 38 % (ref 35–47)
HGB BLD-MCNC: 11.6 G/DL (ref 11.7–15.7)
MCH RBC QN AUTO: 31.4 PG (ref 26.5–33)
MCHC RBC AUTO-ENTMCNC: 30.5 G/DL (ref 31.5–36.5)
MCV RBC AUTO: 103 FL (ref 78–100)
NT-PROBNP SERPL-MCNC: 1152 PG/ML (ref 0–1800)
PLATELET # BLD AUTO: 260 10E3/UL (ref 150–450)
POTASSIUM SERPL-SCNC: 4.2 MMOL/L (ref 3.4–5.3)
RBC # BLD AUTO: 3.7 10E6/UL (ref 3.8–5.2)
SODIUM SERPL-SCNC: 139 MMOL/L (ref 135–145)
WBC # BLD AUTO: 12.8 10E3/UL (ref 4–11)

## 2024-07-12 PROCEDURE — 36415 COLL VENOUS BLD VENIPUNCTURE: CPT

## 2024-07-12 PROCEDURE — 83880 ASSAY OF NATRIURETIC PEPTIDE: CPT

## 2024-07-12 PROCEDURE — 80048 BASIC METABOLIC PNL TOTAL CA: CPT

## 2024-07-12 PROCEDURE — 85027 COMPLETE CBC AUTOMATED: CPT

## 2024-07-15 ENCOUNTER — VIRTUAL VISIT (OUTPATIENT)
Dept: CARDIOLOGY | Facility: CLINIC | Age: 74
End: 2024-07-15
Attending: INTERNAL MEDICINE
Payer: MEDICARE

## 2024-07-15 VITALS
SYSTOLIC BLOOD PRESSURE: 132 MMHG | OXYGEN SATURATION: 90 % | BODY MASS INDEX: 25.58 KG/M2 | DIASTOLIC BLOOD PRESSURE: 70 MMHG | HEIGHT: 62 IN | HEART RATE: 104 BPM | WEIGHT: 139 LBS

## 2024-07-15 DIAGNOSIS — I27.20 PULMONARY HTN (H): Primary | ICD-10-CM

## 2024-07-15 DIAGNOSIS — R06.09 DOE (DYSPNEA ON EXERTION): ICD-10-CM

## 2024-07-15 DIAGNOSIS — R00.2 PALPITATIONS: ICD-10-CM

## 2024-07-15 PROCEDURE — 99215 OFFICE O/P EST HI 40 MIN: CPT | Mod: 95 | Performed by: INTERNAL MEDICINE

## 2024-07-15 RX ORDER — TREPROSTINIL 16 UG/1
16 INHALANT ORAL 4 TIMES DAILY
COMMUNITY
Start: 2024-07-15

## 2024-07-15 RX ORDER — TREPROSTINIL 48 UG/1
48 INHALANT ORAL 4 TIMES DAILY
Qty: 112 EACH | Refills: 11 | Status: SHIPPED | OUTPATIENT
Start: 2024-07-15 | End: 2024-07-15 | Stop reason: ALTCHOICE

## 2024-07-15 RX ORDER — TREPROSTINIL 32 UG/1
32 INHALANT ORAL 4 TIMES DAILY
Qty: 112 EACH | Refills: 11 | Status: SHIPPED | OUTPATIENT
Start: 2024-07-15 | End: 2024-07-15 | Stop reason: ALTCHOICE

## 2024-07-15 ASSESSMENT — PAIN SCALES - GENERAL: PAINLEVEL: NO PAIN (0)

## 2024-07-15 NOTE — NURSING NOTE
Current patient location: [address] [place] [id number]    Is the patient currently in the state of [place]? YES    Visit mode:VIDEO    If the visit is dropped, the patient can be reconnected by: VIDEO VISIT: Text to cell phone:   Telephone Information:   Mobile [phone]       Will anyone else be joining the visit? Yes, Pt's brother is with the pt and will be joining the video visit per pt  (If patient encounters technical issues they should call [phone]    How would you like to obtain your AVS? MyChart    Are changes needed to the allergy or medication list? Pt stated no med changes    Are refills needed on medications prescribed by this physician? NO    Reason for visit: RECHSHANNA ESPINOF

## 2024-07-15 NOTE — PROGRESS NOTES
July 15, 2024      Dear Dr. Wise,    We had the pleasure of seeing Ms. Soliman in our pulmonary hypertension clinic at [facility].  As you know, she is a 74-year-old female with pulmonary hypertension secondary to interstitial lung disease, likely hypersensitivity pneumonitis.  She is currently on inhaled treprostinil DPI 64 mcg 4 times a day and sildenafil 20 mg 3 times a day.  She returns today for follow-up.    She has been struggling with an upper respiratory tract infection for the last several weeks.  She did not require hospitalization or ER visit but still has some dry cough that is bothering her.  Her oxygen requirements have been stable at 8 to 10 L at rest and 10-15 with exertion.  No worsening exertional shortness of breath.  She is having increasing palpitations.  No exertional chest pain or chest pressure.  She had 1 syncopal episode when she was trying to lift heavy weight and walk.  No lower extremity swelling or abdominal distention.    She is compliant with her inhaled treprostinil 4 times a day.  She also takes sildenafil 3 times a day.  She takes Lasix as needed.    She is being followed by Dr. Rodriguez with the ILD clinic.  She has hypersensitive pneumonitis for which she is on tapering dose of prednisone and mycophenolate.    PMH:  1.  Type 2 diabetes mellitus  2.  Hypertension  3.  Interstitial lung disease  4.  Pulmonary hypertension secondary to interstitial lung disease    Current medication   Current Outpatient Medications   Medication Sig Dispense Refill    aspirin 325 MG tablet Take 325 mg by mouth daily      atorvastatin (LIPITOR) 10 MG tablet Take 10 mg by mouth daily      cholecalciferol 50 MCG (2000 UT) tablet Take 2,000 Units by mouth daily      coenzyme Q10 200 mg capsule [COENZYME Q10 200 MG CAPSULE] Take 200 mg by mouth daily.      dapsone (ACZONE) 100 MG tablet TAKE 1 TABLET BY MOUTH EVERY DAY 90 tablet 1    fluticasone (ARNUITY ELLIPTA) 100 MCG/ACT inhaler  "Inhale 1 puff into the lungs daily 1 each 4    furosemide (LASIX) 20 MG tablet Take 20 mg by mouth daily      latanoprost (XALATAN) 0.005 % ophthalmic solution Place 1 drop into both eyes daily      metFORMIN (GLUCOPHAGE) 500 MG tablet Take 1,000 mg by mouth daily (with dinner) HS      metoprolol tartrate (LOPRESSOR) 25 MG tablet [METOPROLOL TARTRATE (LOPRESSOR) 25 MG TABLET] Take 25 mg by mouth daily.       mycophenolate (GENERIC EQUIVALENT) 500 MG tablet Take 2 tablets (1,000 mg) by mouth daily before breakfast AND 1 tablet (500 mg) every evening. 360 tablet 4    omeprazole (PRILOSEC) 20 MG capsule [OMEPRAZOLE (PRILOSEC) 20 MG CAPSULE] Take 20 mg by mouth daily before breakfast.       potassium chloride ER (KLOR-CON M) 20 MEQ CR tablet Take 2 tablets (40 mEq) by mouth 2 times daily 180 tablet 3    predniSONE (DELTASONE) 5 MG tablet Take 3 tablets (15 mg) by mouth daily for 187 days 90 tablet 5    sildenafil (REVATIO) 20 MG tablet TAKE 1 TABLET BY MOUTH 3 TIMES DAILY. 270 tablet 3    Treprostinil 64 MCG POWD Inhale 64 mcg into the lungs 4 times daily       No current facility-administered medications for this visit.     ROS:   Constitutional: No fever, chills, or sweats. No weight gain/loss  ENT: No visual disturbance, ear ache, epistaxis, sore throat  Allergies/Immunologic: Negative   Respiratory: She does have a dry cough on and off.  Cardiovascular: As per HPI  GI: No nausea, vomiting, hematemesis, melena, or hematochezia   : No urinary frequency, dysuria, or hematuria  Integument: Negative  Psychiatric: Negative   Neuro: Negative  Endocrinology: Negative   Musculoskeletal: Negative    EXAM:   /70   Pulse 104   Ht 1.575 m (5' 2\")   Wt 63 kg (139 lb)   SpO2 90%   BMI 25.42 kg/m    Not done due to the video nature of the visit.  She was comfortable.  She was in no apparent distress.  She was awake alert oriented x 3.  No respiratory distress.    Labs:    Recent Results (from the past 168 hour(s)) "   Basic metabolic panel    Collection Time: 07/12/24  9:50 AM   Result Value Ref Range    Sodium 139 135 - 145 mmol/L    Potassium 4.2 3.4 - 5.3 mmol/L    Chloride 101 98 - 107 mmol/L    Carbon Dioxide (CO2) 30 (H) 22 - 29 mmol/L    Anion Gap 8 7 - 15 mmol/L    Urea Nitrogen 12.1 8.0 - 23.0 mg/dL    Creatinine 0.78 0.51 - 0.95 mg/dL    GFR Estimate 78 >60 mL/min/1.73m2    Calcium 9.4 8.8 - 10.2 mg/dL    Glucose 198 (H) 70 - 99 mg/dL   N terminal pro BNP outpatient    Collection Time: 07/12/24  9:50 AM   Result Value Ref Range    N Terminal Pro BNP Outpatient 1,152 0 - 1,800 pg/mL   CBC with platelets    Collection Time: 07/12/24  9:50 AM   Result Value Ref Range    WBC Count 12.8 (H) 4.0 - 11.0 10e3/uL    RBC Count 3.70 (L) 3.80 - 5.20 10e6/uL    Hemoglobin 11.6 (L) 11.7 - 15.7 g/dL    Hematocrit 38.0 35.0 - 47.0 %     (H) 78 - 100 fL    MCH 31.4 26.5 - 33.0 pg    MCHC 30.5 (L) 31.5 - 36.5 g/dL    RDW 14.1 10.0 - 15.0 %    Platelet Count 260 150 - 450 10e3/uL        Echocardiogram (04/2024)  1. Left ventricular chamber size is normal. Mild concentric increase in wall  thickness. Systolic function is normal. The visually estimated left  ventricular ejection fraction is 55-60%.  2. Right ventricular chamber size is normal. Systolic function is mildly  reduced.  3. Mild left atrial enlargement.  4. No hemodynamically significant valvular abnormalities.  5. Mild pulmonary hypertension is present with an estimated pulmonary artery  systolic pressure of 46 mmHg.  6. Compared to the prior study dated 3/15/2023, there has been no significant  change.    [facility] (03/2023)  Right Heart Catheterization:  /80/112 mmHg   BPM    RA 8/10/7 mmHg  RV 90/7 mmHg  PA 90/29 (49) mmHg  PCW 11/11/9 mmHg  Terrance CO 3.71 L/min Normal = 4.0-8.0 L/min  Terrance CI 2.32 L/min/m2 Normal = 2.5-4.0 L/min/m2  TD CO 3.57 L/min Normal = 4.0-8.0 L/min  TD CI 2.23 L/min/m2 Normal = 2.5-4.0 L/min/m2  PA sat 58%   Hgb 11 g/dL   PVR 10.8 Woods  units  SVR 1056.1 dynes-sec/cm5          Latest Ref Rng & Units 3/22/2024     9:15 AM   PFT   FVC L 1.73    FEV1 L 1.53    FVC% % 77    FEV1% % 88        6MWT (01/2024)  On her 6-minute walk test she walked 219 meters, which is better than before.  She desaturated to 77% on 10 liters and have required 15 L of supplemental oxygen to maintain a saturation of 85% in the past.    Assessment and Plan:   In summary, Ms. Soliman is a very delightful 74-year-old female with pulmonary hypertension due to interstitial lung disease who returns today for follow-up.  She is currently on inhaled treprostinil DPI 64 mcg 4 times a day and sildenafil 20 mg 3 times a day.    1. Pulmonary arterial hypertension out of proportion to interstitial lung disease:     She had a syncopal episode while she was exerting and lifting weight.  Otherwise she has no evidence of worsening right heart failure.  Her most recent echocardiogram shows normal right ventricular function with mild right ventricular dilatation.  This is significantly better when compared to 2 years ago.  Her NT proBNP is also within normal limits and downtrending.  Thus, I suspect her syncope is related to her low oxygen level.  Her oxygen requirements have been high due to her ILD.  Recent evidence suggest that patients with ILD pH can tolerate higher dose of inhaled treprostinil.  I discussed the risk and benefits.  She is willing to try this.  Given her severe pulmonary arterial hypertension, I recommend her to increase her inhaled treprostinil to 80 mcg 4 times a day.  Hopefully this will help with her exercise capacity and reduce her high oxygen requirements.  Otherwise, we will continue her on sildenafil 20 mg 3 times a day.  She takes furosemide as needed.  She will continue to use 8 to 10 L of oxygen at rest and increase it with exercise as needed.  She is not a candidate for lung transplantation.    As she is on maximal therapy and less likely to change treatment  regimen, will defer repeat heart catheterization for now unless she has significant clinical deterioration.      2.  Interstitial lung disease - This is managed by Dr. Rodriguez.  She is on tapering dose of prednisone and mycophenolate.    3. SVT - on low dose metoprolol.  She is having worsening palpitation.  Will repeat a ZIO monitor to assess her SVT burden.    I have recommended her to return to see my colleague Janet Duke in 3 months with labs and 6 months with me with labs and 6MWT.  Will decide on right heart catheterization based on her clinical course as this was normal to change her treatment significantly    It was a pleasure meeting Ms. Soliman in our pulmonary hypertension clinic continues to [facility].  We thank you for involving us in her care.    Total time today was 48 minutes reviewing notes, imaging, labs, patient visit, orders and documentation       Sincerely,  Daniel Sanchez MD   [facility]  Heart Failure, Transplant, and Mechanical Circulatory Support Cardiology   Cardiovascular Division  [facility]   [phone]       Virtual Visit Details    Type of service:  Video Visit   Video Start Time:  9.30 am  Video End Time: 10 pm    Originating Location (pt. Location): Home    Distant Location (provider location):  On-site  Platform used for Video Visit: Kobe

## 2024-07-15 NOTE — PATIENT INSTRUCTIONS
You were seen today in the Pulmonary Hypertension Clinic at the [facility].     Cardiology Provider you saw during your visit:    Dr. Sanchez    Medication Changes:  Increase Tyvaso dose to 80mcg 4 times daily after Prior Auth is approved.    Results:   Component      Latest Ref Rng 7/12/2024  9:50 AM   Sodium      135 - 145 mmol/L 139    Potassium      3.4 - 5.3 mmol/L 4.2    Chloride      98 - 107 mmol/L 101    Carbon Dioxide (CO2)      22 - 29 mmol/L 30 (H)    Anion Gap      7 - 15 mmol/L 8    Urea Nitrogen      8.0 - 23.0 mg/dL 12.1    Creatinine      0.51 - 0.95 mg/dL 0.78    GFR Estimate      >60 mL/min/1.73m2 78    Calcium      8.8 - 10.2 mg/dL 9.4    Glucose      70 - 99 mg/dL 198 (H)    WBC      4.0 - 11.0 10e3/uL 12.8 (H)    RBC Count      3.80 - 5.20 10e6/uL 3.70 (L)    Hemoglobin      11.7 - 15.7 g/dL 11.6 (L)    Hematocrit      35.0 - 47.0 % 38.0    MCV      78 - 100 fL 103 (H)    MCH      26.5 - 33.0 pg 31.4    MCHC      31.5 - 36.5 g/dL 30.5 (L)    RDW      10.0 - 15.0 % 14.1    Platelet Count      150 - 450 10e3/uL 260    N-Terminal Pro Bnp      0 - 1,800 pg/mL 1,152       Legend:  (H) High  (L) Low    Recommendations:   Wear a remote heart monitor (Zio) for 7 days  Increase Tyvaso DPI after Prior Auth Approval    Follow-up:   Follow up in 3 months with Janet Duke PA-C with labs prior      Please call us immediately if you have any syncope (fainting or passing out), chest pain, edema (swelling or weight gain), or decline in your functional status (general decline in how you are feeling).    If you have emergent concerns after hours or on the weekend, please call our on-call Cardiologist at [phone], option 4. For emergencies call 911.     Thank you for allowing us to be a part of your care here at the [facility]    If you have questions or concerns please contact us at:    Kelli Hager RN (P: [phone])    Nurse Coordinator       Pulmonary  Hypertension     [facility]         KENNETH Macias   (Prior Auths & Pt Assistance)   ()  Clinic   Clinic   Pulmonary Hypertension   Pulmonary Hypertension  [facility]  (P)[phone]    (P) [phone]  (F) [phone]

## 2024-07-15 NOTE — LETTER
7/15/2024      RE: Patty Soliman  [address]  [employer] [facility] [address]       Dear Colleague,    Thank you for the opportunity to participate in the care of your patient, Patty Sloiman, at the [facility] [place] [facility]. Please see a copy of my visit note below.    July 15, 2024      Dear Dr. Wise,    We had the pleasure of seeing Ms. Soliman in our pulmonary hypertension clinic at [facility].  As you know, she is a 74-year-old female with pulmonary hypertension secondary to interstitial lung disease, likely hypersensitivity pneumonitis.  She is currently on inhaled treprostinil DPI 64 mcg 4 times a day and sildenafil 20 mg 3 times a day.  She returns today for follow-up.    She has been struggling with an upper respiratory tract infection for the last several weeks.  She did not require hospitalization or ER visit but still has some dry cough that is bothering her.  Her oxygen requirements have been stable at 8 to 10 L at rest and 10-15 with exertion.  No worsening exertional shortness of breath.  She is having increasing palpitations.  No exertional chest pain or chest pressure.  She had 1 syncopal episode when she was trying to lift heavy weight and walk.  No lower extremity swelling or abdominal distention.    She is compliant with her inhaled treprostinil 4 times a day.  She also takes sildenafil 3 times a day.  She takes Lasix as needed.    She is being followed by Dr. Rodriguez with the ILD clinic.  She has hypersensitive pneumonitis for which she is on tapering dose of prednisone and mycophenolate.    PMH:  1.  Type 2 diabetes mellitus  2.  Hypertension  3.  Interstitial lung disease  4.  Pulmonary hypertension secondary to interstitial lung disease    Current medication   Current Outpatient Medications   Medication Sig Dispense Refill     aspirin 325 MG tablet Take 325 mg by mouth daily       atorvastatin (LIPITOR)  10 MG tablet Take 10 mg by mouth daily       cholecalciferol 50 MCG (2000 UT) tablet Take 2,000 Units by mouth daily       coenzyme Q10 200 mg capsule [COENZYME Q10 200 MG CAPSULE] Take 200 mg by mouth daily.       dapsone (ACZONE) 100 MG tablet TAKE 1 TABLET BY MOUTH EVERY DAY 90 tablet 1     fluticasone (ARNUITY ELLIPTA) 100 MCG/ACT inhaler Inhale 1 puff into the lungs daily 1 each 4     furosemide (LASIX) 20 MG tablet Take 20 mg by mouth daily       latanoprost (XALATAN) 0.005 % ophthalmic solution Place 1 drop into both eyes daily       metFORMIN (GLUCOPHAGE) 500 MG tablet Take 1,000 mg by mouth daily (with dinner) HS       metoprolol tartrate (LOPRESSOR) 25 MG tablet [METOPROLOL TARTRATE (LOPRESSOR) 25 MG TABLET] Take 25 mg by mouth daily.        mycophenolate (GENERIC EQUIVALENT) 500 MG tablet Take 2 tablets (1,000 mg) by mouth daily before breakfast AND 1 tablet (500 mg) every evening. 360 tablet 4     omeprazole (PRILOSEC) 20 MG capsule [OMEPRAZOLE (PRILOSEC) 20 MG CAPSULE] Take 20 mg by mouth daily before breakfast.        potassium chloride ER (KLOR-CON M) 20 MEQ CR tablet Take 2 tablets (40 mEq) by mouth 2 times daily 180 tablet 3     predniSONE (DELTASONE) 5 MG tablet Take 3 tablets (15 mg) by mouth daily for 187 days 90 tablet 5     sildenafil (REVATIO) 20 MG tablet TAKE 1 TABLET BY MOUTH 3 TIMES DAILY. 270 tablet 3     Treprostinil 64 MCG POWD Inhale 64 mcg into the lungs 4 times daily       No current facility-administered medications for this visit.     ROS:   Constitutional: No fever, chills, or sweats. No weight gain/loss  ENT: No visual disturbance, ear ache, epistaxis, sore throat  Allergies/Immunologic: Negative   Respiratory: She does have a dry cough on and off.  Cardiovascular: As per HPI  GI: No nausea, vomiting, hematemesis, melena, or hematochezia   : No urinary frequency, dysuria, or hematuria  Integument: Negative  Psychiatric: Negative   Neuro: Negative  Endocrinology: Negative  "  Musculoskeletal: Negative    EXAM:   /70   Pulse 104   Ht 1.575 m (5' 2\")   Wt 63 kg (139 lb)   SpO2 90%   BMI 25.42 kg/m    Not done due to the video nature of the visit.  She was comfortable.  She was in no apparent distress.  She was awake alert oriented x 3.  No respiratory distress.    Labs:    Recent Results (from the past 168 hour(s))   Basic metabolic panel    Collection Time: 07/12/24  9:50 AM   Result Value Ref Range    Sodium 139 135 - 145 mmol/L    Potassium 4.2 3.4 - 5.3 mmol/L    Chloride 101 98 - 107 mmol/L    Carbon Dioxide (CO2) 30 (H) 22 - 29 mmol/L    Anion Gap 8 7 - 15 mmol/L    Urea Nitrogen 12.1 8.0 - 23.0 mg/dL    Creatinine 0.78 0.51 - 0.95 mg/dL    GFR Estimate 78 >60 mL/min/1.73m2    Calcium 9.4 8.8 - 10.2 mg/dL    Glucose 198 (H) 70 - 99 mg/dL   N terminal pro BNP outpatient    Collection Time: 07/12/24  9:50 AM   Result Value Ref Range    N Terminal Pro BNP Outpatient 1,152 0 - 1,800 pg/mL   CBC with platelets    Collection Time: 07/12/24  9:50 AM   Result Value Ref Range    WBC Count 12.8 (H) 4.0 - 11.0 10e3/uL    RBC Count 3.70 (L) 3.80 - 5.20 10e6/uL    Hemoglobin 11.6 (L) 11.7 - 15.7 g/dL    Hematocrit 38.0 35.0 - 47.0 %     (H) 78 - 100 fL    MCH 31.4 26.5 - 33.0 pg    MCHC 30.5 (L) 31.5 - 36.5 g/dL    RDW 14.1 10.0 - 15.0 %    Platelet Count 260 150 - 450 10e3/uL        Echocardiogram (04/2024)  1. Left ventricular chamber size is normal. Mild concentric increase in wall  thickness. Systolic function is normal. The visually estimated left  ventricular ejection fraction is 55-60%.  2. Right ventricular chamber size is normal. Systolic function is mildly  reduced.  3. Mild left atrial enlargement.  4. No hemodynamically significant valvular abnormalities.  5. Mild pulmonary hypertension is present with an estimated pulmonary artery  systolic pressure of 46 mmHg.  6. Compared to the prior study dated 3/15/2023, there has been no significant  change.    RHC " (03/2023)  Right Heart Catheterization:  /80/112 mmHg   BPM    RA 8/10/7 mmHg  RV 90/7 mmHg  PA 90/29 (49) mmHg  PCW 11/11/9 mmHg  Terrance CO 3.71 L/min Normal = 4.0-8.0 L/min  [place]ance CI 2.32 L/min/m2 Normal = 2.5-4.0 L/min/m2  TD CO 3.57 L/min Normal = 4.0-8.0 L/min  TD CI 2.23 L/min/m2 Normal = 2.5-4.0 L/min/m2  PA sat 58%   Hgb 11 g/dL   PVR 10.8 Woods units  SVR 1056.1 dynes-sec/cm5          Latest Ref Rng & Units 3/22/2024     9:15 AM   PFT   FVC L 1.73    FEV1 L 1.53    FVC% % 77    FEV1% % 88        6MWT (01/2024)  On her 6-minute walk test she walked 219 meters, which is better than before.  She desaturated to 77% on 10 liters and have required 15 L of supplemental oxygen to maintain a saturation of 85% in the past.    Assessment and Plan:   In summary, Ms. Soliman is a very delightful 74-year-old female with pulmonary hypertension due to interstitial lung disease who returns today for follow-up.  She is currently on inhaled treprostinil DPI 64 mcg 4 times a day and sildenafil 20 mg 3 times a day.    1. Pulmonary arterial hypertension out of proportion to interstitial lung disease:     She had a syncopal episode while she was exerting and lifting weight.  Otherwise she has no evidence of worsening right heart failure.  Her most recent echocardiogram shows normal right ventricular function with mild right ventricular dilatation.  This is significantly better when compared to 2 years ago.  Her NT proBNP is also within normal limits and downtrending.  Thus, I suspect her syncope is related to her low oxygen level.  Her oxygen requirements have been high due to her ILD.  Recent evidence suggest that patients with ILD pH can tolerate higher dose of inhaled treprostinil.  I discussed the risk and benefits.  She is willing to try this.  Given her severe pulmonary arterial hypertension, I recommend her to increase her inhaled treprostinil to 80 mcg 4 times a day.  Hopefully this will help with her exercise  capacity and reduce her high oxygen requirements.  Otherwise, we will continue her on sildenafil 20 mg 3 times a day.  She takes furosemide as needed.  She will continue to use 8 to 10 L of oxygen at rest and increase it with exercise as needed.  She is not a candidate for lung transplantation.    As she is on maximal therapy and less likely to change treatment regimen, will defer repeat heart catheterization for now unless she has significant clinical deterioration.      2.  Interstitial lung disease - This is managed by Dr. Rodriguez.  She is on tapering dose of prednisone and mycophenolate.    3. SVT - on low dose metoprolol.  She is having worsening palpitation.  Will repeat a ZIO monitor to assess her SVT burden.    I have recommended her to return to see my colleague Janet Duke in 3 months with labs and 6 months with me with labs and 6MWT.  Will decide on right heart catheterization based on her clinical course as this was normal to change her treatment significantly    It was a pleasure meeting Ms. Soliman in our pulmonary hypertension clinic continues to [facility].  We thank you for involving us in her care.    Total time today was 48 minutes reviewing notes, imaging, labs, patient visit, orders and documentation       Sincerely,  Daniel Sanchez MD   [facility]  Heart Failure, Transplant, and Mechanical Circulatory Support Cardiology   Cardiovascular Division  [facility]   [phone]       Virtual Visit Details    Type of service:  Video Visit   Video Start Time:  9.30 am  Video End Time: 10 pm    Originating Location (pt. Location): Home    Distant Location (provider location):  On-site  Platform used for Video Visit: Kobe

## 2024-07-15 NOTE — NURSING NOTE
"Reviewed Med list  Completed AVS  Follow-up orders placed  Marked chart \"Ready for Checkout\"  Sent staff msg for increased dose of Tyvaso to 80mcg QID to Xin & Team for new PA.  Saray Angel RN on 7/15/2024 at 11:09 AM    "

## 2024-07-16 ENCOUNTER — ORDERS ONLY (AUTO-RELEASED) (OUTPATIENT)
Dept: CARDIOLOGY | Facility: CLINIC | Age: 74
End: 2024-07-16
Payer: MEDICARE

## 2024-07-16 DIAGNOSIS — R00.2 PALPITATIONS: ICD-10-CM

## 2024-07-16 DIAGNOSIS — I27.20 PULMONARY HTN (H): ICD-10-CM

## 2024-07-16 DIAGNOSIS — R06.09 DOE (DYSPNEA ON EXERTION): ICD-10-CM

## 2024-07-18 ENCOUNTER — MYC MEDICAL ADVICE (OUTPATIENT)
Dept: CARDIOLOGY | Facility: CLINIC | Age: 74
End: 2024-07-18
Payer: MEDICARE

## 2024-07-18 ENCOUNTER — TELEPHONE (OUTPATIENT)
Dept: CARDIOLOGY | Facility: CLINIC | Age: 74
End: 2024-07-18
Payer: MEDICARE

## 2024-07-18 NOTE — TELEPHONE ENCOUNTER
Left Voicemail (1st Attempt) for the patient to call back and schedule the following:    Appointment type: rtn cardio   Provider: jesus  Return date: 11/10/24  Specialty phone number: [phone] opt 1   Additional appointment(s) needed: n/a   Additonal Notes: n/a

## 2024-07-22 NOTE — TELEPHONE ENCOUNTER
Left Voicemail (2nd Attempt) for the patient to call back and schedule the following:    Appointment type:  RTN HF   Provider: MEMO  Return date: 10/21/24  Specialty phone number: [phone] OPT 1   Additional appointment(s) needed: LABS   Additonal Notes: N/A

## 2024-07-31 ENCOUNTER — TELEPHONE (OUTPATIENT)
Dept: CARDIOLOGY | Facility: CLINIC | Age: 74
End: 2024-07-31
Payer: MEDICARE

## 2024-07-31 NOTE — TELEPHONE ENCOUNTER
Patient was able to receive the tyvaso DPI fo 80mcg using the 64mcg and 16mcg cartridge. She notes this has been going well. She also updated she mailed back her zio monitor. Will follow-up on results when april Hager RN on 7/31/2024 at 10:33 AM

## 2024-08-05 PROCEDURE — 93248 EXT ECG>7D<15D REV&INTERPJ: CPT | Performed by: INTERNAL MEDICINE

## 2024-08-08 NOTE — TELEPHONE ENCOUNTER
Called patient to update her zio monitor. Normal results no changes needed at this time    Kelli Hager RN on 8/8/2024 at 10:56 AM

## 2024-09-22 DIAGNOSIS — J67.9 HYPERSENSITIVITY PNEUMONIA (H): ICD-10-CM

## 2024-09-22 DIAGNOSIS — J84.9 ILD (INTERSTITIAL LUNG DISEASE) (H): ICD-10-CM

## 2024-09-23 RX ORDER — PREDNISONE 5 MG/1
15 TABLET ORAL DAILY
Qty: 90 TABLET | Refills: 5 | Status: SHIPPED | OUTPATIENT
Start: 2024-09-23

## 2024-09-28 ENCOUNTER — HEALTH MAINTENANCE LETTER (OUTPATIENT)
Age: 74
End: 2024-09-28

## 2024-10-24 ENCOUNTER — TELEPHONE (OUTPATIENT)
Dept: CARDIOLOGY | Facility: CLINIC | Age: 74
End: 2024-10-24
Payer: MEDICARE

## 2024-10-24 ENCOUNTER — TELEPHONE (OUTPATIENT)
Dept: PULMONOLOGY | Facility: CLINIC | Age: 74
End: 2024-10-24
Payer: MEDICARE

## 2024-10-24 DIAGNOSIS — J67.9 HYPERSENSITIVITY PNEUMONIA (H): ICD-10-CM

## 2024-10-24 DIAGNOSIS — J45.40 MODERATE PERSISTENT REACTIVE AIRWAY DISEASE WITHOUT COMPLICATION: ICD-10-CM

## 2024-10-24 RX ORDER — FLUTICASONE FUROATE 100 UG/1
1 POWDER RESPIRATORY (INHALATION) DAILY
Qty: 1 EACH | Refills: 4 | Status: SHIPPED | OUTPATIENT
Start: 2024-10-24

## 2024-10-24 NOTE — TELEPHONE ENCOUNTER
10/24 talked with pt, asked us to call rukhsana to schedule, lvm & mc for 11/26 at [facility] location (ok to add on at 11:20 AM per Kelli) [place]

## 2024-10-24 NOTE — TELEPHONE ENCOUNTER
RN verified with Dr. Jennifer mustafa to continue arnuity inhaler at 100 vs increasing back to 200 due to past side effects. Rx refilled at 100.   Vandana Edward RN BSN

## 2024-10-24 NOTE — TELEPHONE ENCOUNTER
10/24 talked with rukhsana, would like a call back at noon to schedule a follow-up with Dr. Sanchez (ok with [facility]) [place]

## 2024-11-16 DIAGNOSIS — Z79.2 PROPHYLACTIC ANTIBIOTIC: ICD-10-CM

## 2024-11-18 RX ORDER — DAPSONE 100 MG/1
TABLET ORAL
Qty: 90 TABLET | Refills: 1 | Status: SHIPPED | OUTPATIENT
Start: 2024-11-18

## 2024-12-18 ENCOUNTER — DOCUMENTATION ONLY (OUTPATIENT)
Dept: CARDIOLOGY | Facility: CLINIC | Age: 74
End: 2024-12-18
Payer: MEDICARE

## (undated) DEVICE — INTRO SHEATH 7FRX10CM PINNACLE RSS702

## (undated) DEVICE — INTRO SHEATH MICRO PLATINUM TIP 4FRX40CM 7274

## (undated) DEVICE — Device

## (undated) DEVICE — UMMC CONVENIENCE KIT FORMALLY H9656021017160 NEW# 602101716

## (undated) DEVICE — PACK HEART RIGHT CUSTOM SAN32RHF18

## (undated) DEVICE — WIRE GUIDE 0.025"X150CM EMERALD J TIP 502524

## (undated) DEVICE — KIT MANIFOLD NAMIC STANDARD 72IN RIGHT HEART 2 PT 613000213

## (undated) RX ORDER — LIDOCAINE 40 MG/G
CREAM TOPICAL
Status: DISPENSED
Start: 2022-08-19

## (undated) RX ORDER — LIDOCAINE 40 MG/G
CREAM TOPICAL
Status: DISPENSED
Start: 2023-03-15

## (undated) RX ORDER — SODIUM CHLORIDE 9 MG/ML
INJECTION, SOLUTION INTRAVENOUS
Status: DISPENSED
Start: 2022-08-19